# Patient Record
Sex: FEMALE | Race: WHITE | Employment: UNEMPLOYED | URBAN - METROPOLITAN AREA
[De-identification: names, ages, dates, MRNs, and addresses within clinical notes are randomized per-mention and may not be internally consistent; named-entity substitution may affect disease eponyms.]

---

## 2017-02-21 RX ORDER — ALENDRONATE SODIUM 70 MG/1
TABLET ORAL
Qty: 12 TABLET | Refills: 0 | Status: SHIPPED | OUTPATIENT
Start: 2017-02-21 | End: 2017-05-14 | Stop reason: SDUPTHER

## 2017-03-12 RX ORDER — POTASSIUM CHLORIDE 20 MEQ/1
TABLET, EXTENDED RELEASE ORAL
Qty: 90 TABLET | Refills: 2 | Status: SHIPPED | OUTPATIENT
Start: 2017-03-12 | End: 2017-12-07 | Stop reason: SDUPTHER

## 2017-03-12 RX ORDER — LISINOPRIL AND HYDROCHLOROTHIAZIDE 25; 20 MG/1; MG/1
TABLET ORAL
Qty: 90 TABLET | Refills: 2 | Status: SHIPPED | OUTPATIENT
Start: 2017-03-12 | End: 2017-12-05 | Stop reason: SDUPTHER

## 2017-03-13 RX ORDER — PAROXETINE HYDROCHLORIDE 12.5 MG/1
TABLET, FILM COATED, EXTENDED RELEASE ORAL
Qty: 90 TABLET | Refills: 1 | Status: SHIPPED | OUTPATIENT
Start: 2017-03-13 | End: 2017-04-11 | Stop reason: SDUPTHER

## 2017-03-28 DIAGNOSIS — F41.9 ANXIETY: ICD-10-CM

## 2017-03-28 RX ORDER — ALPRAZOLAM 0.25 MG/1
0.25 TABLET ORAL DAILY PRN
Qty: 25 TABLET | Refills: 0 | OUTPATIENT
Start: 2017-03-28

## 2017-04-11 ENCOUNTER — OFFICE VISIT (OUTPATIENT)
Dept: PRIMARY CARE CLINIC | Age: 62
End: 2017-04-11
Payer: COMMERCIAL

## 2017-04-11 VITALS
OXYGEN SATURATION: 97 % | RESPIRATION RATE: 16 BRPM | SYSTOLIC BLOOD PRESSURE: 122 MMHG | HEART RATE: 64 BPM | WEIGHT: 196.2 LBS | BODY MASS INDEX: 36.1 KG/M2 | DIASTOLIC BLOOD PRESSURE: 84 MMHG | HEIGHT: 62 IN

## 2017-04-11 DIAGNOSIS — B07.9 VIRAL WART ON FINGER: ICD-10-CM

## 2017-04-11 DIAGNOSIS — F41.9 ANXIETY: ICD-10-CM

## 2017-04-11 DIAGNOSIS — I10 BENIGN ESSENTIAL HTN: ICD-10-CM

## 2017-04-11 DIAGNOSIS — Z12.39 SCREENING FOR BREAST CANCER: ICD-10-CM

## 2017-04-11 DIAGNOSIS — Z00.00 ROUTINE GENERAL MEDICAL EXAMINATION AT A HEALTH CARE FACILITY: Primary | ICD-10-CM

## 2017-04-11 PROCEDURE — 99396 PREV VISIT EST AGE 40-64: CPT | Performed by: NURSE PRACTITIONER

## 2017-04-11 RX ORDER — ALPRAZOLAM 0.25 MG/1
0.25 TABLET ORAL DAILY PRN
Qty: 25 TABLET | Refills: 0 | Status: SHIPPED | OUTPATIENT
Start: 2017-04-11 | End: 2017-10-04 | Stop reason: SDUPTHER

## 2017-04-11 RX ORDER — PAROXETINE HYDROCHLORIDE 25 MG/1
25 TABLET, FILM COATED, EXTENDED RELEASE ORAL EVERY MORNING
Qty: 90 TABLET | Refills: 3 | Status: SHIPPED | OUTPATIENT
Start: 2017-04-11 | End: 2018-03-19 | Stop reason: SDUPTHER

## 2017-04-11 ASSESSMENT — ENCOUNTER SYMPTOMS
BLOOD IN STOOL: 0
VOMITING: 0
SINUS PRESSURE: 0
TROUBLE SWALLOWING: 0
ABDOMINAL PAIN: 0
DIARRHEA: 0
SORE THROAT: 0
COUGH: 0
NAUSEA: 0
CONSTIPATION: 0
SHORTNESS OF BREATH: 0
WHEEZING: 0

## 2017-04-11 ASSESSMENT — PATIENT HEALTH QUESTIONNAIRE - PHQ9
SUM OF ALL RESPONSES TO PHQ9 QUESTIONS 1 & 2: 1
2. FEELING DOWN, DEPRESSED OR HOPELESS: 1
1. LITTLE INTEREST OR PLEASURE IN DOING THINGS: 0
SUM OF ALL RESPONSES TO PHQ QUESTIONS 1-9: 1

## 2017-05-15 RX ORDER — ALENDRONATE SODIUM 70 MG/1
TABLET ORAL
Qty: 12 TABLET | Refills: 5 | Status: SHIPPED | OUTPATIENT
Start: 2017-05-15 | End: 2018-07-12 | Stop reason: SDUPTHER

## 2017-06-12 ENCOUNTER — OFFICE VISIT (OUTPATIENT)
Dept: NEUROLOGY | Age: 62
End: 2017-06-12
Payer: COMMERCIAL

## 2017-06-12 VITALS
DIASTOLIC BLOOD PRESSURE: 77 MMHG | SYSTOLIC BLOOD PRESSURE: 127 MMHG | HEIGHT: 61 IN | WEIGHT: 198.8 LBS | HEART RATE: 55 BPM | BODY MASS INDEX: 37.53 KG/M2

## 2017-06-12 DIAGNOSIS — G35 MULTIPLE SCLEROSIS (HCC): ICD-10-CM

## 2017-06-12 DIAGNOSIS — G25.0 ESSENTIAL TREMOR: ICD-10-CM

## 2017-06-12 DIAGNOSIS — G35 MULTIPLE SCLEROSIS (HCC): Primary | ICD-10-CM

## 2017-06-12 PROCEDURE — 99214 OFFICE O/P EST MOD 30 MIN: CPT | Performed by: PSYCHIATRY & NEUROLOGY

## 2017-06-12 RX ORDER — DIMETHYL FUMARATE 240 MG/1
CAPSULE ORAL
Qty: 180 CAPSULE | Refills: 3 | Status: SHIPPED | OUTPATIENT
Start: 2017-06-12 | End: 2018-06-11 | Stop reason: SDUPTHER

## 2017-06-12 RX ORDER — PRIMIDONE 50 MG/1
TABLET ORAL
Qty: 360 TABLET | Refills: 3 | Status: SHIPPED | OUTPATIENT
Start: 2017-06-12 | End: 2018-06-20 | Stop reason: SDUPTHER

## 2017-06-21 ENCOUNTER — HOSPITAL ENCOUNTER (OUTPATIENT)
Dept: MRI IMAGING | Facility: CLINIC | Age: 62
Discharge: HOME | End: 2017-06-21
Payer: COMMERCIAL

## 2017-06-21 DIAGNOSIS — G35 MULTIPLE SCLEROSIS (HCC): ICD-10-CM

## 2017-06-21 LAB — POC CREATININE: 0.7 MG/DL (ref 0.6–1.4)

## 2017-06-21 PROCEDURE — A9579 GAD-BASE MR CONTRAST NOS,1ML: HCPCS | Performed by: PSYCHIATRY & NEUROLOGY

## 2017-06-21 PROCEDURE — 72156 MRI NECK SPINE W/O & W/DYE: CPT

## 2017-06-21 PROCEDURE — 82565 ASSAY OF CREATININE: CPT

## 2017-06-21 PROCEDURE — 6360000004 HC RX CONTRAST MEDICATION: Performed by: PSYCHIATRY & NEUROLOGY

## 2017-06-21 PROCEDURE — 70553 MRI BRAIN STEM W/O & W/DYE: CPT

## 2017-06-21 RX ADMIN — GADOPENTETATE DIMEGLUMINE 18 ML: 469.01 INJECTION INTRAVENOUS at 10:39

## 2017-07-10 RX ORDER — AMLODIPINE BESYLATE 5 MG/1
TABLET ORAL
Qty: 90 TABLET | Refills: 1 | Status: SHIPPED | OUTPATIENT
Start: 2017-07-10 | End: 2017-12-14 | Stop reason: SDUPTHER

## 2017-10-04 ENCOUNTER — HOSPITAL ENCOUNTER (OUTPATIENT)
Age: 62
Discharge: HOME | End: 2017-10-04
Payer: COMMERCIAL

## 2017-10-04 ENCOUNTER — OFFICE VISIT (OUTPATIENT)
Dept: PRIMARY CARE CLINIC | Age: 62
End: 2017-10-04
Payer: COMMERCIAL

## 2017-10-04 VITALS
SYSTOLIC BLOOD PRESSURE: 108 MMHG | BODY MASS INDEX: 37.19 KG/M2 | WEIGHT: 197 LBS | DIASTOLIC BLOOD PRESSURE: 66 MMHG | HEIGHT: 61 IN | RESPIRATION RATE: 14 BRPM | HEART RATE: 68 BPM

## 2017-10-04 DIAGNOSIS — Z01.818 ENCOUNTER FOR PRE-OPERATIVE EXAMINATION: Primary | ICD-10-CM

## 2017-10-04 DIAGNOSIS — F41.9 ANXIETY: ICD-10-CM

## 2017-10-04 LAB
EKG ATRIAL RATE: 63 BPM
EKG P AXIS: 25 DEGREES
EKG P-R INTERVAL: 140 MS
EKG Q-T INTERVAL: 442 MS
EKG QRS DURATION: 90 MS
EKG QTC CALCULATION (BAZETT): 452 MS
EKG R AXIS: 13 DEGREES
EKG T AXIS: 24 DEGREES
EKG VENTRICULAR RATE: 63 BPM

## 2017-10-04 PROCEDURE — 99214 OFFICE O/P EST MOD 30 MIN: CPT | Performed by: NURSE PRACTITIONER

## 2017-10-04 PROCEDURE — 93005 ELECTROCARDIOGRAM TRACING: CPT

## 2017-10-04 PROCEDURE — 93000 ELECTROCARDIOGRAM COMPLETE: CPT | Performed by: NURSE PRACTITIONER

## 2017-10-04 RX ORDER — ALPRAZOLAM 0.25 MG/1
0.25 TABLET ORAL DAILY PRN
Qty: 25 TABLET | Refills: 0 | Status: SHIPPED | OUTPATIENT
Start: 2017-10-04 | End: 2018-08-17 | Stop reason: SDUPTHER

## 2017-10-04 ASSESSMENT — ENCOUNTER SYMPTOMS
ABDOMINAL PAIN: 0
BACK PAIN: 0
SHORTNESS OF BREATH: 0
COUGH: 0

## 2017-10-04 NOTE — PROGRESS NOTES
Visit Information    Have you changed or started any medications since your last visit including any over-the-counter medicines, vitamins, or herbal medicines? no   Are you having any side effects from any of your medications? -  no  Have you stopped taking any of your medications? Is so, why? -  no    Have you seen any other physician or provider since your last visit? No  Have you had any other diagnostic tests since your last visit? No  Have you been seen in the emergency room and/or had an admission to a hospital since we last saw you? No  Have you had your routine dental cleaning in the past 6 months? yes     Have you activated your [employer] account? If not, what are your barriers?  No: pending     Patient Care Team:  Danis Baird DO as PCP - General (Internal Medicine)  Waldemar Sneed MD as Consulting Physician (Neurology)  Twila Colón MD as Consulting Physician (Gastroenterology)    Medical History Review  Past Medical, Family, and Social History reviewed and does contribute to the patient presenting condition    Health Maintenance   Topic Date Due    DTaP/Tdap/Td vaccine (1 - Tdap) 12/26/1976    Cervical cancer screen  01/01/2003    Colon cancer screen colonoscopy  11/20/2016    Flu vaccine (1) 09/01/2017    Hepatitis C screen  04/11/2016 (Originally 1955)    HIV screen  04/11/2018 (Originally 12/26/1972)    Breast cancer screen  06/25/2018    Lipid screen  03/26/2021
TAKE 1 TABLET DAILY 90 tablet 2    lisinopril-hydrochlorothiazide (PRINZIDE;ZESTORETIC) 20-25 MG per tablet TAKE 1 TABLET DAILY 90 tablet 2    LIALDA 1.2 G EC tablet       aspirin 81 MG EC tablet Take 81 mg by mouth daily. No current facility-administered medications for this visit. No Known Allergies    Health Maintenance   Topic Date Due    DTaP/Tdap/Td vaccine (1 - Tdap) 12/26/1976    Cervical cancer screen  01/01/2003    Colon cancer screen colonoscopy  11/20/2016    Flu vaccine (1) 09/01/2017    Hepatitis C screen  04/11/2018 (Originally 1955)    HIV screen  04/11/2018 (Originally 12/26/1972)    Breast cancer screen  06/25/2018    Lipid screen  03/26/2021       Subjective:      Review of Systems   Constitutional: Negative for chills, fatigue and fever. HENT: Negative for congestion. Respiratory: Negative for cough and shortness of breath. Cardiovascular: Negative for chest pain and palpitations. Gastrointestinal: Negative for abdominal pain. Genitourinary: Negative for dysuria. Musculoskeletal: Negative for back pain. Left foot bunion   Neurological: Negative for dizziness and numbness. Psychiatric/Behavioral: Negative for sleep disturbance. The patient is not nervous/anxious. Objective:     Physical Exam   Constitutional: She is oriented to person, place, and time. She appears well-developed and well-nourished. HENT:   Head: Normocephalic and atraumatic. Eyes: Pupils are equal, round, and reactive to light. Neck: Normal range of motion. Neck supple. Cardiovascular: Normal rate, regular rhythm and normal heart sounds. Pulmonary/Chest: Effort normal and breath sounds normal.   Abdominal: Soft. Bowel sounds are normal. There is no tenderness. Musculoskeletal: Normal range of motion. Neurological: She is alert and oriented to person, place, and time. Skin: Skin is warm and dry. Psychiatric: She has a normal mood and affect.  Her behavior

## 2017-10-10 ENCOUNTER — TELEPHONE (OUTPATIENT)
Dept: PRIMARY CARE CLINIC | Age: 62
End: 2017-10-10

## 2017-12-05 NOTE — TELEPHONE ENCOUNTER
Health Maintenance   Topic Date Due    DTaP/Tdap/Td vaccine (1 - Tdap) 12/26/1976    Cervical cancer screen  01/01/2003    Colon cancer screen colonoscopy  11/20/2016    Flu vaccine (1) 09/01/2017    Hepatitis C screen  04/11/2016 (Originally 1955)    HIV screen  04/11/2018 (Originally 12/26/1972)    Breast cancer screen  06/25/2018    Lipid screen  03/26/2021             (applicable per patient's age: Cancer Screenings, Depression Screening, Fall Risk Screening, Immunizations)    LDL Cholesterol (mg/dL)   Date Value   03/18/2015 129     LDL Calculated (mg/dL)   Date Value   03/26/2016 128     AST (IU/L)   Date Value   03/26/2016 15     ALT (IU/L)   Date Value   03/26/2016 10     BUN (mg/dl)   Date Value   03/26/2016 17      (goal A1C is < 7)   (goal LDL is <100) need 30-50% reduction from baseline     BP Readings from Last 3 Encounters:   10/04/17 108/66   06/12/17 127/77   04/11/17 122/84    (goal /80)      All Future Testing planned in [facility]:  Lab Frequency Next Occurrence   CBC Once 05/11/2017   Lipid Panel Once 05/11/2017   TSH with Reflex Once 05/11/2017   Comprehensive Metabolic Panel Once 01/17/5627   DANIA Digital Screen Bilateral Once 05/11/2017   ALT Once 07/27/2017   Vitamin B12 Once 07/27/2017   AST Once 07/27/2017   Folate Once 07/27/2017   CBC Once 06/12/2017   Primidone level Once 07/27/2017       Next Visit Date:  Future Appointments  Date Time Provider Nabila Moeller   6/20/2018 1:20 PM Debbie Toure MD Neuro Spec [email or web address]            Patient Active Problem List:     Carpal tunnel syndrome     Multiple sclerosis ([address]     Hepatitis     Osteopenia     Dyslipidemia     Anxiety     Crohn disease ([address]     Benign essential HTN

## 2017-12-06 RX ORDER — LISINOPRIL AND HYDROCHLOROTHIAZIDE 25; 20 MG/1; MG/1
TABLET ORAL
Qty: 90 TABLET | Refills: 2 | Status: SHIPPED | OUTPATIENT
Start: 2017-12-06 | End: 2018-09-04 | Stop reason: SDUPTHER

## 2017-12-07 RX ORDER — POTASSIUM CHLORIDE 20 MEQ/1
TABLET, EXTENDED RELEASE ORAL
Qty: 90 TABLET | Refills: 2 | Status: SHIPPED | OUTPATIENT
Start: 2017-12-07 | End: 2018-09-04 | Stop reason: SDUPTHER

## 2017-12-14 RX ORDER — AMLODIPINE BESYLATE 5 MG/1
TABLET ORAL
Qty: 90 TABLET | Refills: 1 | Status: SHIPPED | OUTPATIENT
Start: 2017-12-14 | End: 2018-06-12 | Stop reason: SDUPTHER

## 2018-03-08 DIAGNOSIS — G25.0 ESSENTIAL TREMOR: ICD-10-CM

## 2018-03-08 DIAGNOSIS — G35 MULTIPLE SCLEROSIS (HCC): ICD-10-CM

## 2018-06-11 DIAGNOSIS — G35 MULTIPLE SCLEROSIS (HCC): ICD-10-CM

## 2018-06-11 RX ORDER — DIMETHYL FUMARATE 240 MG/1
CAPSULE ORAL
Qty: 180 CAPSULE | Refills: 0 | Status: SHIPPED | OUTPATIENT
Start: 2018-06-11 | End: 2018-09-02 | Stop reason: SDUPTHER

## 2018-06-12 RX ORDER — AMLODIPINE BESYLATE 5 MG/1
TABLET ORAL
Qty: 90 TABLET | Refills: 1 | Status: SHIPPED | OUTPATIENT
Start: 2018-06-12 | End: 2018-12-09 | Stop reason: SDUPTHER

## 2018-06-20 ENCOUNTER — OFFICE VISIT (OUTPATIENT)
Dept: NEUROLOGY | Age: 63
End: 2018-06-20
Payer: COMMERCIAL

## 2018-06-20 VITALS
HEART RATE: 65 BPM | SYSTOLIC BLOOD PRESSURE: 128 MMHG | BODY MASS INDEX: 36.25 KG/M2 | WEIGHT: 197 LBS | DIASTOLIC BLOOD PRESSURE: 65 MMHG | HEIGHT: 62 IN

## 2018-06-20 DIAGNOSIS — G35 MULTIPLE SCLEROSIS (HCC): Primary | ICD-10-CM

## 2018-06-20 DIAGNOSIS — G25.0 ESSENTIAL TREMOR: ICD-10-CM

## 2018-06-20 PROCEDURE — 99214 OFFICE O/P EST MOD 30 MIN: CPT | Performed by: PSYCHIATRY & NEUROLOGY

## 2018-06-20 RX ORDER — PRIMIDONE 50 MG/1
TABLET ORAL
Qty: 360 TABLET | Refills: 3 | Status: SHIPPED | OUTPATIENT
Start: 2018-06-20 | End: 2019-07-10 | Stop reason: SDUPTHER

## 2018-06-20 RX ORDER — DIMETHYL FUMARATE 240 MG/1
CAPSULE ORAL
Qty: 180 CAPSULE | Refills: 3 | Status: SHIPPED | OUTPATIENT
Start: 2018-06-20 | End: 2019-09-27 | Stop reason: SDUPTHER

## 2018-06-20 ASSESSMENT — ENCOUNTER SYMPTOMS
EYES NEGATIVE: 1
GASTROINTESTINAL NEGATIVE: 1
RESPIRATORY NEGATIVE: 1

## 2018-06-22 LAB
ABSOLUTE BASO #: 0.1 K/UL (ref 0–0.1)
ABSOLUTE EOS #: 0.2 K/UL (ref 0.1–0.4)
ABSOLUTE LYMPH #: 1.6 K/UL (ref 0.8–5.2)
ABSOLUTE MONO #: 0.8 K/UL (ref 0.1–0.9)
ABSOLUTE NEUT #: 4.4 K/UL (ref 1.3–9.1)
ALT SERPL-CCNC: 17 U/L (ref 5–40)
AST SERPL-CCNC: 18 U/L (ref 9–40)
BASOPHILS RELATIVE PERCENT: 0.7 %
EOSINOPHILS RELATIVE PERCENT: 3.1 %
HCT VFR BLD CALC: 41 % (ref 36–48)
HEMOGLOBIN: 13.8 G/DL (ref 12–16)
LYMPHOCYTE %: 21.9 %
MCH RBC QN AUTO: 29.8 PG (ref 27–34)
MCHC RBC AUTO-ENTMCNC: 33.7 G/DL (ref 31–36)
MCV RBC AUTO: 88.6 FL (ref 80–100)
MONOCYTES # BLD: 11 %
NEUTROPHILS RELATIVE PERCENT: 62.3 %
PDW BLD-RTO: 13 % (ref 10.8–14.8)
PLATELETS: 306 K/UL (ref 150–450)
RBC: 4.63 M/UL (ref 4–5.5)
WBC: 7.1 K/UL (ref 3.7–10.8)

## 2018-07-12 RX ORDER — ALENDRONATE SODIUM 70 MG/1
TABLET ORAL
Qty: 12 TABLET | Refills: 5 | Status: SHIPPED | OUTPATIENT
Start: 2018-07-12 | End: 2019-09-05 | Stop reason: SDUPTHER

## 2018-07-12 NOTE — TELEPHONE ENCOUNTER
Last OV 10/04/17      Health Maintenance   Topic Date Due    Hepatitis C screen  1955    HIV screen  12/26/1972    DTaP/Tdap/Td vaccine (1 - Tdap) 12/26/1976    Cervical cancer screen  01/01/2003    Shingles Vaccine (1 of 2 - 2 Dose Series) 12/26/2007    Colon cancer screen colonoscopy  11/20/2016    Potassium monitoring  03/26/2017    Creatinine monitoring  03/26/2017    Breast cancer screen  06/25/2018    Flu vaccine (1) 09/01/2018    Lipid screen  03/26/2021             (applicable per patient's age: Cancer Screenings, Depression Screening, Fall Risk Screening, Immunizations)    LDL Cholesterol (mg/dL)   Date Value   03/18/2015 129     LDL Calculated (mg/dL)   Date Value   03/26/2016 128     AST (U/L)   Date Value   06/22/2018 18     ALT (U/L)   Date Value   06/22/2018 17     BUN (mg/dl)   Date Value   03/26/2016 17      (goal A1C is < 7)   (goal LDL is <100) need 30-50% reduction from baseline     BP Readings from Last 3 Encounters:   06/20/18 128/65   10/04/17 108/66   06/12/17 127/77    (goal /80)      All Future Testing planned in [facility]:  Lab Frequency Next Occurrence   ALT Once 08/04/2018   AST Once 08/04/2018   CBC With Auto Differential Once 08/04/2018   WI CANE ADJUST/FIXED QUAD/3 PRO Once 08/04/2018       Next Visit Date:  Future Appointments  Date Time Provider Nabila Edwardsi   8/22/2018 1:00 PM Judie Longoria MD Neuro Spec [email or web address]            Patient Active Problem List:     Carpal tunnel syndrome     Multiple sclerosis ([address]     Hepatitis     Osteopenia     Dyslipidemia     Anxiety     Crohn disease ([facility] [address]     Benign essential HTN

## 2018-08-17 DIAGNOSIS — F41.9 ANXIETY: ICD-10-CM

## 2018-08-17 RX ORDER — ALPRAZOLAM 0.25 MG/1
TABLET ORAL
Qty: 25 TABLET | Refills: 0 | Status: SHIPPED | OUTPATIENT
Start: 2018-08-17 | End: 2019-03-25 | Stop reason: SDUPTHER

## 2018-08-17 NOTE — TELEPHONE ENCOUNTER
Last Ov 10/4/17      Health Maintenance   Topic Date Due    Hepatitis C screen  1955    HIV screen  12/26/1972    DTaP/Tdap/Td vaccine (1 - Tdap) 12/26/1976    Cervical cancer screen  01/01/2003    Shingles Vaccine (1 of 2 - 2 Dose Series) 12/26/2007    Colon cancer screen colonoscopy  11/20/2016    Potassium monitoring  03/26/2017    Creatinine monitoring  03/26/2017    Breast cancer screen  06/25/2018    Flu vaccine (1) 09/01/2018    Lipid screen  03/26/2021             (applicable per patient's age: Cancer Screenings, Depression Screening, Fall Risk Screening, Immunizations)    LDL Cholesterol (mg/dL)   Date Value   03/18/2015 129     LDL Calculated (mg/dL)   Date Value   03/26/2016 128     AST (U/L)   Date Value   06/22/2018 18     ALT (U/L)   Date Value   06/22/2018 17     BUN (mg/dl)   Date Value   03/26/2016 17      (goal A1C is < 7)   (goal LDL is <100) need 30-50% reduction from baseline     BP Readings from Last 3 Encounters:   06/20/18 128/65   10/04/17 108/66   06/12/17 127/77    (goal /80)      All Future Testing planned in [facility]:  Lab Frequency Next Occurrence   ALT Once 08/04/2018   AST Once 08/04/2018   CBC With Auto Differential Once 08/04/2018   VT CANE ADJUST/FIXED QUAD/3 PRO Once 08/04/2018       Next Visit Date:  Future Appointments  Date Time Provider Nabila Edwardsi   8/22/2018 10:40 AM Shelbi Ruggiero MD Neuro Spec [email or web address]            Patient Active Problem List:     Carpal tunnel syndrome     Multiple sclerosis ([facility] [address]     Hepatitis     Osteopenia     Dyslipidemia     Anxiety     Crohn disease ([facility] [address]     Benign essential HTN

## 2018-08-22 ENCOUNTER — OFFICE VISIT (OUTPATIENT)
Dept: NEUROLOGY | Age: 63
End: 2018-08-22
Payer: COMMERCIAL

## 2018-08-22 VITALS
SYSTOLIC BLOOD PRESSURE: 116 MMHG | HEIGHT: 62 IN | DIASTOLIC BLOOD PRESSURE: 75 MMHG | BODY MASS INDEX: 38.72 KG/M2 | WEIGHT: 210.4 LBS | HEART RATE: 74 BPM

## 2018-08-22 DIAGNOSIS — G35 MULTIPLE SCLEROSIS (HCC): Primary | ICD-10-CM

## 2018-08-22 PROCEDURE — 99214 OFFICE O/P EST MOD 30 MIN: CPT | Performed by: PSYCHIATRY & NEUROLOGY

## 2018-08-22 RX ORDER — ACETAMINOPHEN 160 MG
TABLET,DISINTEGRATING ORAL
COMMUNITY
End: 2021-03-24

## 2018-08-22 RX ORDER — CHOLECALCIFEROL (VITAMIN D3) 50 MCG
2000 TABLET ORAL DAILY
Qty: 90 TABLET | Refills: 3 | Status: SHIPPED | OUTPATIENT
Start: 2018-08-22 | End: 2020-06-04 | Stop reason: SDUPTHER

## 2018-08-22 RX ORDER — PERPHENAZINE 16 MG
TABLET ORAL
COMMUNITY

## 2018-08-22 RX ORDER — LANOLIN ALCOHOL/MO/W.PET/CERES
1000 CREAM (GRAM) TOPICAL DAILY
COMMUNITY
End: 2019-09-27 | Stop reason: ALTCHOICE

## 2018-08-22 ASSESSMENT — ENCOUNTER SYMPTOMS
GASTROINTESTINAL NEGATIVE: 1
EYES NEGATIVE: 1
RESPIRATORY NEGATIVE: 1

## 2018-08-22 NOTE — COMMUNICATION BODY
Subjective:      Patient ID: Berenice Gamez is a 63 y.o. female. HPI    Active Problem remitting relapsing multiple sclerosis with greater gait imbalance and ataxia on tecfidera referred to [facility] to see if candidate for ocrevus . There is elevated anticardiolipin Ab on aspirin. There is tremor on mysoline . The condition is she was seen by Dr Ronal Coon at [facility] who felt that patient should stay on tecfidera with no active enhancing lesions on imaging  . She is walking with her father's  walker feeling more steadier with no falls with walker use . There will be left leg giveway after being on her feet over 20 minutes . There is upper extremity and head tremor with only mild attenuation on mysoline by her report . There is no visual or bulbar complaints . There is some occasional urinary urgency with no incontinence . She remains on tecfidera tolerating this well. [facility] suggestions were also for vitamin B12 and vitamin D supplementation . Significant medications tecfidera 240 mg po bid ,  aspirin 81 mg po qd, mysoline 50 mg po q AM , 150 mg pos qhs . Previously on avonex. Testing MRI of Head with few foci periventricular white matter supratentorially. The largest is 1.6 cm parietal periventricular area. There is no enhancement . There is also 1 cm right parietal extra axial meningioma, February 2011. MRI of cervical spine with small patchy areas within the cord from C3-4 through C4-5 with no enhancement. There is bulging at C4-5 along with C5-6. CSF analysis with elevated IgG with oligoclonal bands. MORIAH 1:140, anticardiolipin IgM 66 . Primidone level 7.5 , January 2016 . Folate 10 , B12 263, primidone 6.0 (5-12) . MRI of Head with no enhancement . There are disc protrusions and spondylosis with mild spinal stenosis ,June 2017 . MRI of cervical spine with multifocal cord signal abnormalities with no enhancement .  Bilateral periventricular and deep white matter T2 hyperintensities , 2017      Past Medical History:   Diagnosis Date    Cro[facility]'s [facility] ([facility] [address]     Diverticulosis     Hepatitis     Hypertension     Multiple sclerosis ([facility] [address] [facility]     Osteopenia     Psoriasis        Past Surgical History:   Procedure Laterality Date    APPENDECTOMY       SECTION      CHOLECYSTECTOMY      COLONOSCOPY  2013, 2015    CYST REMOVAL      FOOT SURGERY  2012    right foot bunionectomy    TONSILLECTOMY AND ADENOIDECTOMY      WRIST FRACTURE SURGERY         Family History   Problem Relation Age of Onset    Cirrhosis Mother     Heart Disease Father        Social History     Social History    Marital status:      Spouse name: N/A    Number of children: N/A    Years of education: N/A     Social History Main Topics    Smoking status: Never Smoker    Smokeless tobacco: Never Used    Alcohol use No    Drug use: No    Sexual activity: Not Asked     Other Topics Concern    None     Social History Narrative    None       Current Outpatient Prescriptions   Medication Sig Dispense Refill    Cholecalciferol (VITAMIN D3) 2000 units CAPS Take by mouth      vitamin B-12 (CYANOCOBALAMIN) 1000 MCG tablet Take 1,000 mcg by mouth daily      Alpha-Lipoic Acid 600 MG CAPS Take by mouth      cyanocobalamin ([employer] VITAMIN B12) 1000 MCG tablet Take 1 tablet by mouth daily 90 tablet 3    Cholecalciferol (VITAMIN D) 2000 units TABS tablet Take 1 tablet by mouth daily 90 tablet 3    ALPRAZolam (XANAX) 0.25 MG tablet TAKE ONE TABLET BY MOUTH DAILY AS NEEDED FOR ANXIETY 25 tablet 0    alendronate (FOSAMAX) 70 MG tablet TAKE 1 TABLET EVERY 7 DAYS 12 tablet 5    primidone (MYSOLINE) 50 MG tablet Take 1po qAM , 3 po qhs 360 tablet 3    dimethyl fumarate (TECFIDERA) 240 MG delayed release capsule Take 1 capsule by mouth 2 times daily.  180 capsule 3    amLODIPine (NORVASC) 5 MG tablet TAKE 1 TABLET DAILY 90 tablet 1    TECFIDERA 240 MG delayed release capsule TAKE masseter and temporalis . Intact corneal reflex. Normal facial sensation  Cranial nerve 7 normal exam   Cranial nerve 8. Grossly intact hearing   Cranial nerve 9 and 10. Symmetric palate elevation   Cranial nerve 11 , 5 out of 5 strength   Cranial Nerve 12 midline tongue . No atrophy  Sensation . Normal proprioception . Intact Vibration . Normal pinprick and light touch   Deep Tendon Reflexes normal  Plantar response equivocal bilaterally      Assessment:       1. Multiple sclerosis    She is to remain on tecfidera undergoing PT balance therapy  . Will get for her walker with seat readjusting mysoline to 100 mg po bid placing her on B12 and Vitamin D tablets     Orders Placed This Encounter   Procedures    PT eval and treat     Standing Status:   Future     Standing Expiration Date:   8/22/2019     Scheduling Instructions:      Assess and treat .  Balance therapy, gait retraining 3 weeks 3 x per week    Walker rolling     With seat

## 2018-08-22 NOTE — LETTER
through C4-5 with no enhancement. There is bulging at C4-5 along with C5-6. CSF analysis with elevated IgG with oligoclonal bands. MORIAH 1:140, anticardiolipin IgM 66 . Primidone level 7.5 , 2016 . Folate 10 , B12 263, primidone 6.0 (5-12) . MRI of Head with no enhancement . There are disc protrusions and spondylosis with mild spinal stenosis ,2017 . MRI of cervical spine with multifocal cord signal abnormalities with no enhancement .  Bilateral periventricular and deep white matter T2  hyperintensities , 2017      Past Medical History:   Diagnosis Date    [address]'s [facility] ([facility] [address]     Diverticulosis     Hepatitis     Hypertension     Multiple sclerosis ([facility]     Osteopenia     Psoriasis        Past Surgical History:   Procedure Laterality Date    APPENDECTOMY       SECTION      CHOLECYSTECTOMY      COLONOSCOPY  2013, 2015    CYST REMOVAL      FOOT SURGERY  2012    right foot bunionectomy    TONSILLECTOMY AND ADENOIDECTOMY      WRIST FRACTURE SURGERY         Family History   Problem Relation Age of Onset    Cirrhosis Mother     Heart Disease Father        Social History     Social History    Marital status:      Spouse name: N/A    Number of children: N/A    Years of education: N/A     Social History Main Topics    Smoking status: Never Smoker    Smokeless tobacco: Never Used    Alcohol use No    Drug use: No    Sexual activity: Not Asked     Other Topics Concern    None     Social History Narrative    None       Current Outpatient Prescriptions   Medication Sig Dispense Refill    Cholecalciferol (VITAMIN D3) 2000 units CAPS Take by mouth      vitamin B-12 (CYANOCOBALAMIN) 1000 MCG tablet Take 1,000 mcg by mouth daily      Alpha-Lipoic Acid 600 MG CAPS Take by mouth      cyanocobalamin ([employer]S VITAMIN B12) 1000 MCG tablet Take 1 tablet by mouth daily 90 tablet 3    Cholecalciferol (VITAMIN D) 2000 units TABS tablet Take 1 tablet by Musculoskeletal. Muscle bulk and tone normal                                                               Muscle strength 5/5 throughout                                                                              No dysmetria or dysdiadokinesis  Mild postural head and arm tremor   Normal fine motor  Gait ataxia  Orientation Alert and oriented x 3   Language process and speech normal . No aphasia   Cranial nerve 2 normal acuety and visual fields  Cranial nerve 3, 4 and 6 . Extraocular muscles are intact . Pupils are equal and reactive   Cranial nerve 5 . Normal strength of masseter and temporalis . Intact corneal reflex. Normal facial sensation  Cranial nerve 7 normal exam   Cranial nerve 8. Grossly intact hearing   Cranial nerve 9 and 10. Symmetric palate elevation   Cranial nerve 11 , 5 out of 5 strength   Cranial Nerve 12 midline tongue . No atrophy  Sensation . Normal proprioception . Intact Vibration . Normal pinprick and light touch   Deep Tendon Reflexes normal  Plantar response equivocal bilaterally      Assessment:       1. Multiple sclerosis    She is to remain on tecfidera undergoing PT balance therapy  . Will get for her walker with seat readjusting mysoline to 100 mg po bid placing her on B12 and Vitamin D tablets     Orders Placed This Encounter   Procedures    PT eval and treat     Standing Status:   Future     Standing Expiration Date:   8/22/2019     Scheduling Instructions:      Assess and treat . Balance therapy, gait retraining 3 weeks 3 x per week    Walker rolling     With seat               If you have questions, please do not hesitate to call me. I look forward to following Lauren Mandujano along with you.     Sincerely,        Patrice Hunt MD    CC providers:  [facility] - [facility]  [address] [facility] [address] [facility]  [address]  [facility] [id number]  [facility] [address]

## 2018-08-22 NOTE — PROGRESS NOTES
Subjective:      Patient ID: Holli Olvera is a 63 y.o. female. HPI    Active Problem remitting relapsing multiple sclerosis with greater gait imbalance and ataxia on tecfidera referred to [facility] to see if candidate for ocrevus . There is elevated anticardiolipin Ab on aspirin. There is tremor on mysoline . The condition is she was seen by Dr Douglas Thomas at [facility] who felt that patient should stay on tecfidera with no active enhancing lesions on imaging  . She is walking with her father's  walker feeling more steadier with no falls with walker use . There will be left leg giveway after being on her feet over 20 minutes . There is upper extremity and head tremor with only mild attenuation on mysoline by her report . There is no visual or bulbar complaints . There is some occasional urinary urgency with no incontinence . She remains on tecfidera tolerating this well. [facility] suggestions were also for vitamin B12 and vitamin D supplementation . Significant medications tecfidera 240 mg po bid ,  aspirin 81 mg po qd, mysoline 50 mg po q AM , 150 mg pos qhs . Previously on avonex. Testing MRI of Head with few foci periventricular white matter supratentorially. The largest is 1.6 cm parietal periventricular area. There is no enhancement . There is also 1 cm right parietal extra axial meningioma, February 2011. MRI of cervical spine with small patchy areas within the cord from C3-4 through C4-5 with no enhancement. There is bulging at C4-5 along with C5-6. CSF analysis with elevated IgG with oligoclonal bands. MORIAH 1:140, anticardiolipin IgM 66 . Primidone level 7.5 , January 2016 . Folate 10 , B12 263, primidone 6.0 (5-12) . MRI of Head with no enhancement . There are disc protrusions and spondylosis with mild spinal stenosis ,June 2017 .  MRI of cervical spine with multifocal cord signal abnormalities with no enhancement ilateral periventricular and deep white matter T2 hyperintensities , 2017      Past Medical History:   Diagnosis Date    Cro[facility] disease ([facility] [address]     Diverticulosis     Hepatitis     Hypertension     Multiple sclerosis ([facility] [address]     Osteopenia     Psoriasis        Past Surgical History:   Procedure Laterality Date    APPENDECTOMY       SECTION      CHOLECYSTECTOMY      COLONOSCOPY  2013, 2015    CYST REMOVAL      FOOT SURGERY  2012    right foot bunionectomy    TONSILLECTOMY AND ADENOIDECTOMY      WRIST FRACTURE SURGERY         Family History   Problem Relation Age of Onset    Cirrhosis Mother     Heart Disease Father        Social History     Social History    Marital status:      Spouse name: N/A    Number of children: N/A    Years of education: N/A     Social History Main Topics    Smoking status: Never Smoker    Smokeless tobacco: Never Used    Alcohol use No    Drug use: No    Sexual activity: Not Asked     Other Topics Concern    None     Social History Narrative    None       Current Outpatient Prescriptions   Medication Sig Dispense Refill    Cholecalciferol (VITAMIN D3) 2000 units CAPS Take by mouth      vitamin B-12 (CYANOCOBALAMIN) 1000 MCG tablet Take 1,000 mcg by mouth daily      Alpha-Lipoic Acid 600 MG CAPS Take by mouth      cyanocobalamin (CVS VITAMIN B12) 1000 MCG tablet Take 1 tablet by mouth daily 90 tablet 3    Cholecalciferol (VITAMIN D) 2000 units TABS tablet Take 1 tablet by mouth daily 90 tablet 3    ALPRAZolam (XANAX) 0.25 MG tablet TAKE ONE TABLET BY MOUTH DAILY AS NEEDED FOR ANXIETY 25 tablet 0    alendronate (FOSAMAX) 70 MG tablet TAKE 1 TABLET EVERY 7 DAYS 12 tablet 5    primidone (MYSOLINE) 50 MG tablet Take 1po qAM , 3 po qhs 360 tablet 3    dimethyl fumarate (TECFIDERA) 240 MG delayed release capsule Take 1 capsule by mouth 2 times daily.  180 capsule 3    amLODIPine (NORVASC) 5 MG tablet TAKE 1 TABLET DAILY 90 tablet 1    TECFIDERA 240 MG delayed release capsule TAKE 1 CAPSULE BY MOUTH 2 TIMES DAILY 180 capsule 0    PARoxetine (PAXIL CR) 25 MG extended release tablet Take 1 tablet by mouth every morning 90 tablet 3    potassium chloride (KLOR-CON M) 20 MEQ extended release tablet TAKE 1 TABLET DAILY 90 tablet 2    lisinopril-hydrochlorothiazide (PRINZIDE;ZESTORETIC) 20-25 MG per tablet TAKE 1 TABLET DAILY 90 tablet 2    LIALDA 1.2 G EC tablet       aspirin 81 MG EC tablet Take 81 mg by mouth daily. No current facility-administered medications for this visit. No Known Allergies        Review of Systems   Constitutional: Positive for unexpected weight change. HENT: Negative. Eyes: Negative. Respiratory: Negative. Cardiovascular: Negative. Gastrointestinal: Negative. Endocrine: Negative. Genitourinary: Positive for urgency. Musculoskeletal: Positive for gait problem. Skin: Negative. Neurological: Positive for tremors and weakness. Hematological: Negative. Psychiatric/Behavioral: Negative. Objective:   Physical Exam    Vitals:    08/22/18 1044   BP: 116/75   Pulse: 74       Neurological Examination  Constitutional .General exam well groomed   Ears /Nose/Throat: external ear . Normal exam  Neck and thyroid . Normal size  Respiratory . Breathsounds clear bilaterally  Cardiovascular: Auscultation of heart with regular rate and rhythm and normal heart sounds  Musculoskeletal. Muscle bulk and tone normal                                                               Muscle strength 5/5 throughout                                                                              No dysmetria or dysdiadokinesis  Mild postural head and arm tremor   Normal fine motor  Gait ataxia  Orientation Alert and oriented x 3   Language process and speech normal . No aphasia   Cranial nerve 2 normal acuety and visual fields  Cranial nerve 3, 4 and 6 . Extraocular muscles are intact . Pupils are equal and reactive   Cranial nerve 5 . Normal strength of masseter and temporalis . Intact corneal reflex. Normal facial sensation  Cranial nerve 7 normal exam   Cranial nerve 8. Grossly intact hearing   Cranial nerve 9 and 10. Symmetric palate elevation   Cranial nerve 11 , 5 out of 5 strength   Cranial Nerve 12 midline tongue . No atrophy  Sensation . Normal proprioception . Intact Vibration . Normal pinprick and light touch   Deep Tendon Reflexes normal  Plantar response equivocal bilaterally      Assessment:       1. Multiple sclerosis    She is to remain on tecfidera undergoing PT balance therapy  . Will get for her walker with seat readjusting mysoline to 100 mg po bid placing her on B12 and Vitamin D tablets     Orders Placed This Encounter   Procedures    PT eval and treat     Standing Status:   Future     Standing Expiration Date:   8/22/2019     Scheduling Instructions:      Assess and treat .  Balance therapy, gait retraining 3 weeks 3 x per week    Walker rolling     With seat

## 2018-09-02 DIAGNOSIS — G35 MULTIPLE SCLEROSIS (HCC): ICD-10-CM

## 2018-09-04 RX ORDER — LISINOPRIL AND HYDROCHLOROTHIAZIDE 25; 20 MG/1; MG/1
TABLET ORAL
Qty: 90 TABLET | Refills: 2 | Status: SHIPPED | OUTPATIENT
Start: 2018-09-04 | End: 2019-06-03 | Stop reason: SDUPTHER

## 2018-09-04 RX ORDER — POTASSIUM CHLORIDE 20 MEQ/1
TABLET, EXTENDED RELEASE ORAL
Qty: 90 TABLET | Refills: 2 | Status: SHIPPED | OUTPATIENT
Start: 2018-09-04 | End: 2019-11-17 | Stop reason: SDUPTHER

## 2018-09-05 RX ORDER — DIMETHYL FUMARATE 240 MG/1
CAPSULE ORAL
Qty: 180 CAPSULE | Refills: 2 | Status: SHIPPED | OUTPATIENT
Start: 2018-09-05 | End: 2019-08-14 | Stop reason: SDUPTHER

## 2018-09-06 ENCOUNTER — TELEPHONE (OUTPATIENT)
Dept: NEUROLOGY | Age: 63
End: 2018-09-06

## 2018-09-06 NOTE — TELEPHONE ENCOUNTER
Received a call from Farooq Barnes that she had received a jury duty notice. Discussed with  and we can excuse her. Mailed her the note.

## 2018-10-12 DIAGNOSIS — G35 MULTIPLE SCLEROSIS (HCC): ICD-10-CM

## 2018-11-12 ENCOUNTER — OFFICE VISIT (OUTPATIENT)
Dept: NEUROLOGY | Age: 63
End: 2018-11-12
Payer: COMMERCIAL

## 2018-11-12 VITALS
DIASTOLIC BLOOD PRESSURE: 73 MMHG | SYSTOLIC BLOOD PRESSURE: 123 MMHG | BODY MASS INDEX: 40.23 KG/M2 | WEIGHT: 218.6 LBS | HEART RATE: 63 BPM | HEIGHT: 62 IN

## 2018-11-12 DIAGNOSIS — G25.0 ESSENTIAL TREMOR: ICD-10-CM

## 2018-11-12 DIAGNOSIS — G35 MULTIPLE SCLEROSIS (HCC): Primary | ICD-10-CM

## 2018-11-12 PROCEDURE — 99214 OFFICE O/P EST MOD 30 MIN: CPT | Performed by: PSYCHIATRY & NEUROLOGY

## 2018-11-12 ASSESSMENT — ENCOUNTER SYMPTOMS
EYES NEGATIVE: 1
GASTROINTESTINAL NEGATIVE: 1
RESPIRATORY NEGATIVE: 1
ALLERGIC/IMMUNOLOGIC NEGATIVE: 1

## 2018-11-12 NOTE — LETTER
[facility] Neurology Specialist  [facility] [place] [address]  Phone: [phone]  Fax: [phone]    Porfirio Garcia*        November 13, 2018       Patient: Julia Atwood   MR Number: [id number]   YOB: 1955   Date of Visit: 11/12/2018       Dear Dr. Thao Ano: Thank you for the request for consultation for Concepcion Ramsey. Below are the relevant portions of my assessment and plan of care. Subjective:      Patient ID: Julia Atwood is a 63 y.o. female. HPI    Active Problem remitting relapsing multiple sclerosis with greater gait imbalance and ataxia on tecfidera having been evaluated at [facility] with postural temor readjusting mysoline dosage . There is elevated anticardiolipin Ab on aspirin. The condition is she reports to be using walker although intermittently having had no falls since last seen in August . She is using tecfidera tolerating this well . Mysoline increase did not help any further in attenuating tremor residual tolerating dosage . There will be left leg giveway after being on her feet over 20 minutes . There is upper extremity and head tremor with only mild attenuation on mysoline by her report . There is no visual or bulbar complaints . There is some occasional urinary urgency with no incontinence . Significant medications tecfidera 240 mg po bid ,  aspirin 81 mg po qd, mysoline 100 mg po bid  , vitamin D 2000 units qd . Previously on avonex. Testing MRI of Head with few foci periventricular white matter supratentorially. The largest is 1.6 cm parietal periventricular area. There is no enhancement . There is also 1 cm right parietal extra axial meningioma, February 2011. MRI of cervical spine with small patchy areas within the cord from C3-4 through C4-5 with no enhancement. There is bulging at C4-5 along with C5-6.  CSF analysis with elevated IgG with

## 2018-11-12 NOTE — PROGRESS NOTES
Hepatitis     Hypertension     Multiple sclerosis ([facility] [address]     Osteopenia     Psoriasis        Past Surgical History:   Procedure Laterality Date    APPENDECTOMY       SECTION      CHOLECYSTECTOMY      COLONOSCOPY  2013, 2015    CYST REMOVAL      FOOT SURGERY  2012    right foot bunionectomy    TONSILLECTOMY AND ADENOIDECTOMY      WRIST FRACTURE SURGERY         Family History   Problem Relation Age of Onset    Cirrhosis Mother     Heart Disease Father        Social History     Social History    Marital status:      Spouse name: N/A    Number of children: N/A    Years of education: N/A     Social History Main Topics    Smoking status: Never Smoker    Smokeless tobacco: Never Used    Alcohol use No    Drug use: No    Sexual activity: Not Asked     Other Topics Concern    None     Social History Narrative    None       Current Outpatient Prescriptions   Medication Sig Dispense Refill    TECFIDERA 240 MG delayed release capsule TAKE 1 CAPSULE BY MOUTH 2 TIMES DAILY 180 capsule 2    potassium chloride (KLOR-CON M) 20 MEQ extended release tablet TAKE 1 TABLET DAILY 90 tablet 2    lisinopril-hydrochlorothiazide (PRINZIDE;ZESTORETIC) 20-25 MG per tablet TAKE 1 TABLET DAILY 90 tablet 2    Cholecalciferol (VITAMIN D3) 2000 units CAPS Take by mouth      vitamin B-12 (CYANOCOBALAMIN) 1000 MCG tablet Take 1,000 mcg by mouth daily      Alpha-Lipoic Acid 600 MG CAPS Take by mouth      cyanocobalamin (CVS VITAMIN B12) 1000 MCG tablet Take 1 tablet by mouth daily 90 tablet 3    Cholecalciferol (VITAMIN D) 2000 units TABS tablet Take 1 tablet by mouth daily 90 tablet 3    ALPRAZolam (XANAX) 0.25 MG tablet TAKE ONE TABLET BY MOUTH DAILY AS NEEDED FOR ANXIETY 25 tablet 0    alendronate (FOSAMAX) 70 MG tablet TAKE 1 TABLET EVERY 7 DAYS 12 tablet 5    primidone (MYSOLINE) 50 MG tablet Take 1po qAM , 3 po qhs 360 tablet 3    dimethyl fumarate (TECFIDERA) 240 MG delayed

## 2018-11-30 ENCOUNTER — HOSPITAL ENCOUNTER (OUTPATIENT)
Age: 63
Setting detail: SPECIMEN
Discharge: HOME | End: 2018-11-30
Payer: COMMERCIAL

## 2018-12-05 LAB — SURGICAL PATHOLOGY REPORT: NORMAL

## 2018-12-10 RX ORDER — AMLODIPINE BESYLATE 5 MG/1
TABLET ORAL
Qty: 90 TABLET | Refills: 1 | Status: SHIPPED | OUTPATIENT
Start: 2018-12-10 | End: 2019-06-08 | Stop reason: SDUPTHER

## 2019-03-18 RX ORDER — PAROXETINE HYDROCHLORIDE 25 MG/1
TABLET, FILM COATED, EXTENDED RELEASE ORAL
Qty: 90 TABLET | Refills: 3 | Status: SHIPPED | OUTPATIENT
Start: 2019-03-18 | End: 2020-03-12

## 2019-03-25 ENCOUNTER — OFFICE VISIT (OUTPATIENT)
Dept: PRIMARY CARE CLINIC | Age: 64
End: 2019-03-25
Payer: COMMERCIAL

## 2019-03-25 VITALS
RESPIRATION RATE: 18 BRPM | DIASTOLIC BLOOD PRESSURE: 86 MMHG | BODY MASS INDEX: 41.76 KG/M2 | HEART RATE: 68 BPM | SYSTOLIC BLOOD PRESSURE: 136 MMHG | HEIGHT: 61 IN | WEIGHT: 221.2 LBS

## 2019-03-25 DIAGNOSIS — G35 MULTIPLE SCLEROSIS (HCC): ICD-10-CM

## 2019-03-25 DIAGNOSIS — E78.5 DYSLIPIDEMIA: ICD-10-CM

## 2019-03-25 DIAGNOSIS — F41.9 ANXIETY: ICD-10-CM

## 2019-03-25 DIAGNOSIS — Z13.1 SCREENING FOR DIABETES MELLITUS: ICD-10-CM

## 2019-03-25 DIAGNOSIS — Z12.31 SCREENING MAMMOGRAM, ENCOUNTER FOR: ICD-10-CM

## 2019-03-25 DIAGNOSIS — I10 BENIGN ESSENTIAL HTN: ICD-10-CM

## 2019-03-25 DIAGNOSIS — Z13.0 SCREENING FOR IRON DEFICIENCY ANEMIA: ICD-10-CM

## 2019-03-25 DIAGNOSIS — Z00.00 ANNUAL PHYSICAL EXAM: Primary | ICD-10-CM

## 2019-03-25 PROCEDURE — 99396 PREV VISIT EST AGE 40-64: CPT | Performed by: NURSE PRACTITIONER

## 2019-03-25 RX ORDER — ALPRAZOLAM 0.25 MG/1
TABLET ORAL
Qty: 25 TABLET | Refills: 0 | Status: SHIPPED | OUTPATIENT
Start: 2019-03-25 | End: 2020-03-31 | Stop reason: SDUPTHER

## 2019-03-25 ASSESSMENT — ENCOUNTER SYMPTOMS
COUGH: 0
SHORTNESS OF BREATH: 0
TROUBLE SWALLOWING: 0
ABDOMINAL PAIN: 0
VOMITING: 0
DIARRHEA: 0
NAUSEA: 0
SINUS PRESSURE: 0
SORE THROAT: 0
CONSTIPATION: 0
BLOOD IN STOOL: 0
WHEEZING: 0

## 2019-03-25 ASSESSMENT — PATIENT HEALTH QUESTIONNAIRE - PHQ9
SUM OF ALL RESPONSES TO PHQ QUESTIONS 1-9: 0
SUM OF ALL RESPONSES TO PHQ QUESTIONS 1-9: 0
2. FEELING DOWN, DEPRESSED OR HOPELESS: 0
SUM OF ALL RESPONSES TO PHQ9 QUESTIONS 1 & 2: 0
1. LITTLE INTEREST OR PLEASURE IN DOING THINGS: 0

## 2019-03-31 LAB
ABSOLUTE BASO #: 0 K/UL (ref 0–0.1)
ABSOLUTE EOS #: 0.2 K/UL (ref 0.1–0.4)
ABSOLUTE LYMPH #: 1.2 K/UL (ref 0.8–5.2)
ABSOLUTE MONO #: 1 K/UL (ref 0.1–0.9)
ABSOLUTE NEUT #: 7.9 K/UL (ref 1.3–9.1)
ALBUMIN SERPL-MCNC: 4.6 G/DL (ref 3.5–5.2)
ALK PHOSPHATASE: 60 U/L (ref 30–121)
ALT SERPL-CCNC: 16 U/L (ref 5–40)
ANION GAP SERPL CALCULATED.3IONS-SCNC: 11 MEQ/L (ref 10–19)
AST SERPL-CCNC: 16 U/L (ref 9–40)
BASOPHILS RELATIVE PERCENT: 0.3 %
BILIRUB SERPL-MCNC: 0.2 MG/DL
BUN BLDV-MCNC: 21 MG/DL (ref 8–23)
CALCIUM SERPL-MCNC: 9.4 MG/DL (ref 8.5–10.5)
CHLORIDE BLD-SCNC: 101 MEQ/L (ref 95–107)
CHOLESTEROL/HDL RATIO: 2.9
CHOLESTEROL: 184 MG/DL
CO2: 28 MEQ/L (ref 19–31)
CREAT SERPL-MCNC: 0.7 MG/DL (ref 0.6–1.3)
EGFR AFRICAN AMERICAN: 108.4 ML/MIN/1.73 M2
EGFR IF NONAFRICAN AMERICAN: 93.5 ML/MIN/1.73 M2
EOSINOPHILS RELATIVE PERCENT: 1.5 %
GLUCOSE: 94 MG/DL (ref 70–99)
HCT VFR BLD CALC: 39.9 % (ref 36–48)
HDLC SERPL-MCNC: 62.6 MG/DL
HEMOGLOBIN: 13.9 G/DL (ref 12–16)
LDL CHOLESTEROL CALCULATED: 105 MG/DL
LDL/HDL RATIO: 1.7
LYMPHOCYTE %: 11.6 %
MCH RBC QN AUTO: 30.6 PG (ref 27–34)
MCHC RBC AUTO-ENTMCNC: 34.8 G/DL (ref 31–36)
MCV RBC AUTO: 87.9 FL (ref 80–100)
MONOCYTES # BLD: 9.6 %
NEUTROPHILS RELATIVE PERCENT: 76.6 %
PDW BLD-RTO: 12.8 % (ref 10.8–14.8)
PLATELETS: 320 K/UL (ref 150–450)
POTASSIUM SERPL-SCNC: 4.9 MEQ/L (ref 3.5–5.4)
RBC: 4.54 M/UL (ref 4–5.5)
SODIUM BLD-SCNC: 140 MEQ/L (ref 135–146)
TOTAL PROTEIN: 7.7 G/DL (ref 6.1–8.3)
TRIGL SERPL-MCNC: 80 MG/DL
VLDLC SERPL CALC-MCNC: 16 MG/DL
WBC: 10.4 K/UL (ref 3.7–10.8)

## 2019-04-01 LAB
AVERAGE GLUCOSE: 105 MG/DL (ref 66–114)
HBA1C MFR BLD: 5.3 %

## 2019-04-02 DIAGNOSIS — Z13.1 SCREENING FOR DIABETES MELLITUS: ICD-10-CM

## 2019-04-02 DIAGNOSIS — E78.5 DYSLIPIDEMIA: ICD-10-CM

## 2019-04-02 DIAGNOSIS — Z13.0 SCREENING FOR IRON DEFICIENCY ANEMIA: ICD-10-CM

## 2019-04-02 DIAGNOSIS — G35 MULTIPLE SCLEROSIS (HCC): ICD-10-CM

## 2019-04-02 DIAGNOSIS — I10 BENIGN ESSENTIAL HTN: ICD-10-CM

## 2019-06-03 RX ORDER — LISINOPRIL AND HYDROCHLOROTHIAZIDE 25; 20 MG/1; MG/1
1 TABLET ORAL DAILY
Qty: 90 TABLET | Refills: 1 | Status: SHIPPED | OUTPATIENT
Start: 2019-06-03 | End: 2019-11-30 | Stop reason: SDUPTHER

## 2019-06-03 NOTE — TELEPHONE ENCOUNTER
Last ov [id number]  Health Maintenance   Topic Date Due    Hepatitis C screen  1955    HIV screen  12/26/1972    DTaP/Tdap/Td vaccine (1 - Tdap) 12/26/1976    Cervical cancer screen  01/01/2003    Shingles Vaccine (1 of 2) 12/26/2007    Breast cancer screen  06/25/2018    Colon cancer screen colonoscopy  05/30/2019    Potassium monitoring  03/30/2020    Creatinine monitoring  03/30/2020    Lipid screen  03/30/2024    Flu vaccine  Completed    Pneumococcal 0-64 years Vaccine  Aged Out             (applicable per patient's age: Cancer Screenings, Depression Screening, Fall Risk Screening, Immunizations)    Hemoglobin A1C (%)   Date Value   03/30/2019 5.3     LDL Cholesterol (mg/dL)   Date Value   03/18/2015 129     LDL Calculated (mg/dL)   Date Value   03/30/2019 105     AST (U/L)   Date Value   03/30/2019 16     ALT (U/L)   Date Value   03/30/2019 16     BUN (mg/dL)   Date Value   03/30/2019 21      (goal A1C is < 7)   (goal LDL is <100) need 30-50% reduction from baseline     BP Readings from Last 3 Encounters:   03/25/19 136/86   11/12/18 123/73   08/22/18 116/75    (goal /80)      All Future Testing planned in [facility]:  Lab Frequency Next Occurrence   IA CANE ADJUST/FIXED QUAD/3 PRO Once 08/04/2018   PT eval and treat Once 12/27/2018   DANIA DIGITAL SCREEN W OR WO CAD BILATERAL Once 09/25/2019       Next Visit Date:  No future appointments.          Patient Active Problem List:     Carpal tunnel syndrome     Multiple sclerosis (HCC)     Hepatitis     Osteopenia     Dyslipidemia     Anxiety     Crohn disease ([facility] [address]     Benign essential HTN

## 2019-06-10 RX ORDER — AMLODIPINE BESYLATE 5 MG/1
TABLET ORAL
Qty: 90 TABLET | Refills: 1 | Status: SHIPPED | OUTPATIENT
Start: 2019-06-10 | End: 2019-12-07 | Stop reason: SDUPTHER

## 2019-06-10 NOTE — TELEPHONE ENCOUNTER
Last OV 3/25/19  Health Maintenance   Topic Date Due    Hepatitis C screen  1955    HIV screen  12/26/1972    DTaP/Tdap/Td vaccine (1 - Tdap) 12/26/1976    Cervical cancer screen  01/01/2003    Shingles Vaccine (1 of 2) 12/26/2007    Breast cancer screen  06/25/2018    Colon cancer screen colonoscopy  05/30/2019    Potassium monitoring  03/30/2020    Creatinine monitoring  03/30/2020    Lipid screen  03/30/2024    Flu vaccine  Completed    Pneumococcal 0-64 years Vaccine  Aged Out             (applicable per patient's age: Cancer Screenings, Depression Screening, Fall Risk Screening, Immunizations)    Hemoglobin A1C (%)   Date Value   03/30/2019 5.3     LDL Cholesterol (mg/dL)   Date Value   03/18/2015 129     LDL Calculated (mg/dL)   Date Value   03/30/2019 105     AST (U/L)   Date Value   03/30/2019 16     ALT (U/L)   Date Value   03/30/2019 16     BUN (mg/dL)   Date Value   03/30/2019 21      (goal A1C is < 7)   (goal LDL is <100) need 30-50% reduction from baseline     BP Readings from Last 3 Encounters:   03/25/19 136/86   11/12/18 123/73   08/22/18 116/75    (goal /80)      All Future Testing planned in [facility]:  Lab Frequency Next Occurrence   OH CANE ADJUST/FIXED QUAD/3 PRO Once 08/04/2018   PT eval and treat Once 12/27/2018   DANIA DIGITAL SCREEN W OR WO CAD BILATERAL Once 09/25/2019       Next Visit Date:  No future appointments.          Patient Active Problem List:     Carpal tunnel syndrome     Multiple sclerosis (HCC)     Hepatitis     Osteopenia     Dyslipidemia     Anxiety     Crohn disease ([facility] [address]     Benign essential HTN

## 2019-07-10 RX ORDER — PRIMIDONE 50 MG/1
TABLET ORAL
Qty: 360 TABLET | Refills: 3 | Status: SHIPPED | OUTPATIENT
Start: 2019-07-10 | End: 2020-06-04 | Stop reason: SDUPTHER

## 2019-08-14 DIAGNOSIS — G35 MULTIPLE SCLEROSIS (HCC): ICD-10-CM

## 2019-08-15 RX ORDER — DIMETHYL FUMARATE 240 MG/1
CAPSULE ORAL
Qty: 180 CAPSULE | Refills: 0 | Status: SHIPPED | OUTPATIENT
Start: 2019-08-15 | End: 2019-09-27 | Stop reason: ALTCHOICE

## 2019-09-05 RX ORDER — ALENDRONATE SODIUM 70 MG/1
TABLET ORAL
Qty: 12 TABLET | Refills: 4 | Status: SHIPPED | OUTPATIENT
Start: 2019-09-05

## 2019-09-27 ENCOUNTER — OFFICE VISIT (OUTPATIENT)
Dept: NEUROLOGY | Age: 64
End: 2019-09-27
Payer: COMMERCIAL

## 2019-09-27 VITALS
HEIGHT: 61 IN | WEIGHT: 203.4 LBS | SYSTOLIC BLOOD PRESSURE: 133 MMHG | BODY MASS INDEX: 38.4 KG/M2 | DIASTOLIC BLOOD PRESSURE: 82 MMHG | HEART RATE: 57 BPM

## 2019-09-27 DIAGNOSIS — G35 MULTIPLE SCLEROSIS (HCC): ICD-10-CM

## 2019-09-27 PROCEDURE — 99214 OFFICE O/P EST MOD 30 MIN: CPT | Performed by: PSYCHIATRY & NEUROLOGY

## 2019-09-27 RX ORDER — PRIMIDONE 50 MG/1
TABLET ORAL
Qty: 450 TABLET | Refills: 3 | Status: SHIPPED | OUTPATIENT
Start: 2019-09-27 | End: 2020-06-04

## 2019-09-27 RX ORDER — DIMETHYL FUMARATE 240 MG/1
CAPSULE ORAL
Qty: 180 CAPSULE | Refills: 3 | Status: SHIPPED | OUTPATIENT
Start: 2019-09-27 | End: 2020-06-04 | Stop reason: SDUPTHER

## 2019-09-27 ASSESSMENT — ENCOUNTER SYMPTOMS
ALLERGIC/IMMUNOLOGIC NEGATIVE: 1
RESPIRATORY NEGATIVE: 1
EYES NEGATIVE: 1
GASTROINTESTINAL NEGATIVE: 1

## 2019-09-27 NOTE — PROGRESS NOTES
Subjective:      Patient ID: Radha Murillo is a 66 y.o. female. HPI    Active Problem remitting relapsing multiple sclerosis with imbalance and ataxia on tecfidera having been evaluated at [facility] with postural temor on mysoline . There is elevated anticardiolipin Ab on aspirin. The condition is she reports ongoing stable imbalance using walker with no falls. She reports more tremor of her arms left more than right and head tremor on mysoline 100 mg po bid  . She is using tecfidera tolerating this well . Mysoline has had some tremor attenuation . There will be left leg giveway after being on her feet over 20 minutes . There is no visual or bulbar complaints . There is some occasional urinary urgency with no incontinence . Significant medications tecfidera 240 mg po bid ,  aspirin 81 mg po qd, mysoline 100 mg po bid  , vitamin D 2000 units qd . Previously on avonex. Testing MRI of Head with few foci periventricular white matter supratentorially. The largest is 1.6 cm parietal periventricular area. There is no enhancement . There is also 1 cm right parietal extra axial meningioma, February 2011. MRI of cervical spine with small patchy areas within the cord from C3-4 through C4-5 with no enhancement. There is bulging at C4-5 along with C5-6. CSF analysis with elevated IgG with oligoclonal bands. MORIAH 1:140, anticardiolipin IgM 66 . Primidone level 7.5 , January 2016 . Folate 10 , B12 263, primidone 6.0 (5-12) . MRI of Head with no enhancement . There are disc protrusions and spondylosis with mild spinal stenosis ,June 2017 .  MRI of cervical spine with multifocal cord signal abnormalities with no enhancement ilateral periventricular and deep white matter T2  hyperintensities , June 2017      Past Medical History:   Diagnosis Date    Crohn's disease ([address]     Diverticulosis     Hepatitis     Hypertension     Multiple sclerosis ([facility] [address]     Osteopenia     Psoriasis        Past Surgical

## 2019-09-27 NOTE — LETTER
Primidone level 7.5 , 2016 . Folate 10 , B12 263, primidone 6.0 (5-12) . MRI of Head with no enhancement . There are disc protrusions and spondylosis with mild spinal stenosis ,2017 .  MRI of cervical spine with multifocal cord signal abnormalities with no enhancement ilateral periventricular and deep white matter T2  hyperintensities , 2017      Past Medical History:   Diagnosis Date    Cro[facility]'s disease ([facility] [address]     Diverticulosis     Hepatitis     Hypertension     Multiple sclerosis ([facility] [address]     Osteopenia     Psoriasis        Past Surgical History:   Procedure Laterality Date    APPENDECTOMY       SECTION      CHOLECYSTECTOMY      COLONOSCOPY  2013, 2015    CYST REMOVAL      FOOT SURGERY  2012    right foot bunionectomy    TONSILLECTOMY AND ADENOIDECTOMY      WRIST FRACTURE SURGERY         Family History   Problem Relation Age of Onset    Cirrhosis Mother     Heart Disease Father        Social History     Socioeconomic History    Marital status:      Spouse name: None    Number of children: None    Years of education: None    Highest education level: None   Occupational History    None   Social Needs    Financial resource strain: None    Food insecurity:     Worry: None     Inability: None    Transportation needs:     Medical: None     Non-medical: None   Tobacco Use    Smoking status: Never Smoker    Smokeless tobacco: Never Used   Substance and Sexual Activity    Alcohol use: No     Alcohol/week: 0.0 standard drinks    Drug use: No    Sexual activity: None   Lifestyle    Physical activity:     Days per week: None     Minutes per session: None    Stress: None   Relationships    Social connections:     Talks on phone: None     Gets together: None     Attends Rastafarian service: None     Active member of club or organization: None     Attends meetings of clubs or organizations: None     Relationship status: None  Intimate partner violence:     Fear of current or ex partner: None     Emotionally abused: None     Physically abused: None     Forced sexual activity: None   Other Topics Concern    None   Social History Narrative    None       Current Outpatient Medications   Medication Sig Dispense Refill    primidone (MYSOLINE) 50 MG tablet Take 2 po q AM , 3 po qhs 450 tablet 3    dimethyl fumarate (TECFIDERA) 240 MG delayed release capsule Take 1 capsule by mouth 2 times daily. 180 capsule 3    alendronate (FOSAMAX) 70 MG tablet TAKE 1 TABLET EVERY 7 DAYS 12 tablet 4    primidone (MYSOLINE) 50 MG tablet TAKE 1 TABLET EVERY MORNING AND 3 TABLETS AT BEDTIME 360 tablet 3    amLODIPine (NORVASC) 5 MG tablet TAKE 1 TABLET DAILY 90 tablet 1    lisinopril-hydrochlorothiazide (PRINZIDE;ZESTORETIC) 20-25 MG per tablet Take 1 tablet by mouth daily TAKE 1 TABLET DAILY 90 tablet 1    ALPRAZolam (XANAX) 0.25 MG tablet TAKE ONE TABLET BY MOUTH DAILY AS NEEDED FOR ANXIETY 25 tablet 0    PARoxetine (PAXIL CR) 25 MG extended release tablet TAKE 1 TABLET EVERY MORNING 90 tablet 3    potassium chloride (KLOR-CON M) 20 MEQ extended release tablet TAKE 1 TABLET DAILY 90 tablet 2    Cholecalciferol (VITAMIN D3) 2000 units CAPS Take by mouth      Alpha-Lipoic Acid 600 MG CAPS Take by mouth      cyanocobalamin (CVS VITAMIN B12) 1000 MCG tablet Take 1 tablet by mouth daily 90 tablet 3    Cholecalciferol (VITAMIN D) 2000 units TABS tablet Take 1 tablet by mouth daily 90 tablet 3    LIALDA 1.2 G EC tablet       aspirin 81 MG EC tablet Take 81 mg by mouth daily. No current facility-administered medications for this visit. No Known Allergies        Review of Systems   Constitutional: Negative. HENT: Negative. Eyes: Negative. Respiratory: Negative. Cardiovascular: Negative. Gastrointestinal: Negative. Endocrine: Negative. Genitourinary: Positive for frequency and urgency. Musculoskeletal: Positive for gait problem. Skin: Negative. Allergic/Immunologic: Negative. Neurological: Positive for tremors and weakness. Hematological: Negative. Psychiatric/Behavioral: The patient is nervous/anxious. Objective:   Physical Exam    Vitals:    09/27/19 0902   BP: 133/82   Pulse: 57       Neurological Examination  Constitutional .General exam well groomed   Ears /Nose/Throat: external ear . Normal exam  Neck and thyroid . Normal size  Respiratory . Breathsounds clear bilaterally  Cardiovascular: Auscultation of heart with regular rate and rhythm and normal heart sounds  Musculoskeletal. Muscle bulk and tone normal                                                               Muscle strength mild giveway all limbs left more than right                                                                               No dysmetria or dysdiadokinesis  Mild postural head and arm tremor   Normal fine motor  Gait ataxia  Orientation Alert and oriented x 3   Language process and speech normal . No aphasia   Cranial nerve 2 normal acuety and visual fields  Cranial nerve 3, 4 and 6 . Extraocular muscles are intact . Pupils are equal and reactive   Cranial nerve 5 . Normal strength of masseter and temporalis . Intact corneal reflex. Normal facial sensation  Cranial nerve 7 normal exam   Cranial nerve 8. Grossly intact hearing   Cranial nerve 9 and 10. Symmetric palate elevation   Cranial nerve 11 , 5 out of 5 strength   Cranial Nerve 12 midline tongue . No atrophy  Sensation . Normal proprioception . Intact Vibration . Normal pinprick and light touch   Deep Tendon Reflexes normal  Plantar response equivocal bilaterally      Assessment:       1.  Multiple sclerosis    Will readjust mysoline to 100 mg po qAM , 150 mg po qhs otherwise leaving medication regimen unchanged to have FU MRI of Head         Orders Placed This Encounter   Procedures    MRI Brain W WO Contrast     Standing Status:   Future

## 2019-09-27 NOTE — COMMUNICATION BODY
Subjective:      Patient ID: Squire Mcardle is a 66 y.o. female. HPI    Active Problem remitting relapsing multiple sclerosis with imbalance and ataxia on tecfidera having been evaluated at [facility] with postural temor on mysoline . There is elevated anticardiolipin Ab on aspirin. The condition is she reports ongoing stable imbalance using walker with no falls. She reports more tremor of her arms left more than right and head tremor on mysoline 100 mg po bid  . She is using tecfidera tolerating this well . Mysoline has had some tremor attenuation . There will be left leg giveway after being on her feet over 20 minutes . There is no visual or bulbar complaints . There is some occasional urinary urgency with no incontinence . Significant medications tecfidera 240 mg po bid ,  aspirin 81 mg po qd, mysoline 100 mg po bid  , vitamin D 2000 units qd . Previously on avonex. Testing MRI of Head with few foci periventricular white matter supratentorially. The largest is 1.6 cm parietal periventricular area. There is no enhancement . There is also 1 cm right parietal extra axial meningioma, February 2011. MRI of cervical spine with small patchy areas within the cord from C3-4 through C4-5 with no enhancement. There is bulging at C4-5 along with C5-6. CSF analysis with elevated IgG with oligoclonal bands. MORIAH 1:140, anticardiolipin IgM 66 . Primidone level 7.5 , January 2016 . Folate 10 , B12 263, primidone 6.0 (5-12) . MRI of Head with no enhancement . There are disc protrusions and spondylosis with mild spinal stenosis ,June 2017 .  MRI of cervical spine with multifocal cord signal abnormalities with no enhancement ilateral periventricular and deep white matter T2  hyperintensities , June 2017      Past Medical History:   Diagnosis Date    Crohn's disease ([address]     Diverticulosis     Hepatitis     Hypertension     [facility] sclerosis [facility] [address]     Osteopenia     Psoriasis        Past Surgical History:   Procedure Laterality Date    APPENDECTOMY       SECTION      CHOLECYSTECTOMY      COLONOSCOPY  2013, 2015    CYST REMOVAL      FOOT SURGERY  2012    right foot bunionectomy    TONSILLECTOMY AND ADENOIDECTOMY      WRIST FRACTURE SURGERY         Family History   Problem Relation Age of Onset    Cirrhosis Mother     Heart Disease Father        Social History     Socioeconomic History    Marital status:      Spouse name: None    Number of children: None    Years of education: None    Highest education level: None   Occupational History    None   Social Needs    Financial resource strain: None    Food insecurity:     Worry: None     Inability: None    Transportation needs:     Medical: None     Non-medical: None   Tobacco Use    Smoking status: Never Smoker    Smokeless tobacco: Never Used   Substance and Sexual Activity    Alcohol use: No     Alcohol/week: 0.0 standard drinks    Drug use: No    Sexual activity: None   Lifestyle    Physical activity:     Days per week: None     Minutes per session: None    Stress: None   Relationships    Social connections:     Talks on phone: None     Gets together: None     Attends Bahai service: None     Active member of club or organization: None     Attends meetings of clubs or organizations: None     Relationship status: None    Intimate partner violence:     Fear of current or ex partner: None     Emotionally abused: None     Physically abused: None     Forced sexual activity: None   Other Topics Concern    None   Social History Narrative    None       Current Outpatient Medications   Medication Sig Dispense Refill    primidone (MYSOLINE) 50 MG tablet Take 2 po q AM , 3 po qhs 450 tablet 3    dimethyl fumarate (TECFIDERA) 240 MG delayed release capsule Take 1 capsule by mouth 2 times daily.  180 capsule 3    alendronate (FOSAMAX) 70 MG tablet TAKE 1 TABLET EVERY 7 DAYS 12 tablet 4    primidone (MYSOLINE) sounds  Musculoskeletal. Muscle bulk and tone normal                                                               Muscle strength mild giveway all limbs left more than right                                                                               No dysmetria or dysdiadokinesis  Mild postural head and arm tremor   Normal fine motor  Gait ataxia  Orientation Alert and oriented x 3   Language process and speech normal . No aphasia   Cranial nerve 2 normal acuety and visual fields  Cranial nerve 3, 4 and 6 . Extraocular muscles are intact . Pupils are equal and reactive   Cranial nerve 5 . Normal strength of masseter and temporalis . Intact corneal reflex. Normal facial sensation  Cranial nerve 7 normal exam   Cranial nerve 8. Grossly intact hearing   Cranial nerve 9 and 10. Symmetric palate elevation   Cranial nerve 11 , 5 out of 5 strength   Cranial Nerve 12 midline tongue . No atrophy  Sensation . Normal proprioception . Intact Vibration . Normal pinprick and light touch   Deep Tendon Reflexes normal  Plantar response equivocal bilaterally      Assessment:       1.  Multiple sclerosis    Will readjust mysoline to 100 mg po qAM , 150 mg po qhs otherwise leaving medication regimen unchanged to have FU MRI of Head         Orders Placed This Encounter   Procedures    MRI Brain W WO Contrast     Standing Status:   Future     Standing Expiration Date:   9/26/2020    ALT     Standing Status:   Future     Standing Expiration Date:   9/26/2020    AST     Standing Status:   Future     Standing Expiration Date:   9/26/2020    Primidone level     Standing Status:   Future     Standing Expiration Date:   9/26/2020    CBC With Auto Differential     Standing Status:   Future     Standing Expiration Date:   9/27/2020

## 2019-10-11 ENCOUNTER — HOSPITAL ENCOUNTER (OUTPATIENT)
Dept: MRI IMAGING | Age: 64
Discharge: HOME | End: 2019-10-13
Payer: COMMERCIAL

## 2019-10-11 ENCOUNTER — HOSPITAL ENCOUNTER (OUTPATIENT)
Age: 64
Discharge: HOME | End: 2019-10-11
Payer: COMMERCIAL

## 2019-10-11 DIAGNOSIS — G35 MULTIPLE SCLEROSIS (HCC): ICD-10-CM

## 2019-10-11 LAB
ABSOLUTE EOS #: 0.15 K/UL (ref 0–0.44)
ABSOLUTE IMMATURE GRANULOCYTE: 0.06 K/UL (ref 0–0.3)
ABSOLUTE LYMPH #: 1.7 K/UL (ref 1.1–3.7)
ABSOLUTE MONO #: 0.86 K/UL (ref 0.1–1.2)
ALT SERPL-CCNC: 11 U/L (ref 5–33)
AST SERPL-CCNC: 19 U/L
BASOPHILS # BLD: 1 % (ref 0–2)
BASOPHILS ABSOLUTE: 0.04 K/UL (ref 0–0.2)
BUN BLDV-MCNC: 15 MG/DL (ref 8–23)
CREAT SERPL-MCNC: 0.65 MG/DL (ref 0.5–0.9)
DIFFERENTIAL TYPE: ABNORMAL
EOSINOPHILS RELATIVE PERCENT: 2 % (ref 1–4)
GFR AFRICAN AMERICAN: >60 ML/MIN
GFR NON-AFRICAN AMERICAN: >60 ML/MIN
GFR SERPL CREATININE-BSD FRML MDRD: NORMAL ML/MIN/{1.73_M2}
GFR SERPL CREATININE-BSD FRML MDRD: NORMAL ML/MIN/{1.73_M2}
HCT VFR BLD CALC: 45.2 % (ref 36.3–47.1)
HEMOGLOBIN: 14.2 G/DL (ref 11.9–15.1)
IMMATURE GRANULOCYTES: 1 %
LYMPHOCYTES # BLD: 20 % (ref 24–43)
MCH RBC QN AUTO: 30.7 PG (ref 25.2–33.5)
MCHC RBC AUTO-ENTMCNC: 31.4 G/DL (ref 28.4–34.8)
MCV RBC AUTO: 97.8 FL (ref 82.6–102.9)
MONOCYTES # BLD: 10 % (ref 3–12)
NRBC AUTOMATED: 0 PER 100 WBC
PDW BLD-RTO: 13.5 % (ref 11.8–14.4)
PLATELET # BLD: 373 K/UL (ref 138–453)
PLATELET ESTIMATE: ABNORMAL
PMV BLD AUTO: 10.3 FL (ref 8.1–13.5)
RBC # BLD: 4.62 M/UL (ref 3.95–5.11)
RBC # BLD: ABNORMAL 10*6/UL
SEG NEUTROPHILS: 66 % (ref 36–65)
SEGMENTED NEUTROPHILS ABSOLUTE COUNT: 5.57 K/UL (ref 1.5–8.1)
WBC # BLD: 8.4 K/UL (ref 3.5–11.3)
WBC # BLD: ABNORMAL 10*3/UL

## 2019-10-11 PROCEDURE — A9579 GAD-BASE MR CONTRAST NOS,1ML: HCPCS | Performed by: PSYCHIATRY & NEUROLOGY

## 2019-10-11 PROCEDURE — 80184 ASSAY OF PHENOBARBITAL: CPT

## 2019-10-11 PROCEDURE — 84520 ASSAY OF UREA NITROGEN: CPT

## 2019-10-11 PROCEDURE — 85025 COMPLETE CBC W/AUTO DIFF WBC: CPT

## 2019-10-11 PROCEDURE — 70553 MRI BRAIN STEM W/O & W/DYE: CPT

## 2019-10-11 PROCEDURE — 6360000004 HC RX CONTRAST MEDICATION: Performed by: PSYCHIATRY & NEUROLOGY

## 2019-10-11 PROCEDURE — 84450 TRANSFERASE (AST) (SGOT): CPT

## 2019-10-11 PROCEDURE — 82565 ASSAY OF CREATININE: CPT

## 2019-10-11 PROCEDURE — 80188 ASSAY OF PRIMIDONE: CPT

## 2019-10-11 PROCEDURE — 84460 ALANINE AMINO (ALT) (SGPT): CPT

## 2019-10-11 PROCEDURE — 36415 COLL VENOUS BLD VENIPUNCTURE: CPT

## 2019-10-11 RX ADMIN — GADOTERIDOL 18 ML: 279.3 INJECTION, SOLUTION INTRAVENOUS at 11:33

## 2019-10-13 LAB
PHENOBARBITAL: 8.8 UG/ML (ref 15–40)
PRIMIDONE LEVEL: 9.4 UG/ML (ref 5–12)

## 2019-11-18 RX ORDER — POTASSIUM CHLORIDE 20 MEQ/1
TABLET, EXTENDED RELEASE ORAL
Qty: 90 TABLET | Refills: 4 | Status: SHIPPED | OUTPATIENT
Start: 2019-11-18 | End: 2021-02-10

## 2019-12-02 RX ORDER — LISINOPRIL AND HYDROCHLOROTHIAZIDE 25; 20 MG/1; MG/1
1 TABLET ORAL DAILY
Qty: 90 TABLET | Refills: 0 | Status: SHIPPED | OUTPATIENT
Start: 2019-12-02 | End: 2020-03-01

## 2019-12-09 RX ORDER — AMLODIPINE BESYLATE 5 MG/1
TABLET ORAL
Qty: 90 TABLET | Refills: 0 | Status: SHIPPED | OUTPATIENT
Start: 2019-12-09 | End: 2020-03-09

## 2020-03-09 RX ORDER — AMLODIPINE BESYLATE 5 MG/1
TABLET ORAL
Qty: 90 TABLET | Refills: 3 | Status: SHIPPED | OUTPATIENT
Start: 2020-03-09 | End: 2021-03-02

## 2020-03-09 NOTE — TELEPHONE ENCOUNTER
Last OV 03/25/2019    Health Maintenance   Topic Date Due    Hepatitis C screen  1955    HIV screen  12/26/1972    DTaP/Tdap/Td vaccine (1 - Tdap) 12/26/1976    Cervical cancer screen  01/01/2003    Shingles Vaccine (1 of 2) 12/26/2007    Breast cancer screen  06/25/2018    Colon cancer screen colonoscopy  05/30/2019    Potassium monitoring  03/30/2020    Creatinine monitoring  10/11/2020    Lipid screen  03/30/2024    Flu vaccine  Completed    Hepatitis A vaccine  Aged Out    Hepatitis B vaccine  Aged Out    Hib vaccine  Aged Out    Meningococcal (ACWY) vaccine  Aged Out    Pneumococcal 0-64 years Vaccine  Aged Out             (applicable per patient's age: Cancer Screenings, Depression Screening, Fall Risk Screening, Immunizations)    Hemoglobin A1C (%)   Date Value   03/30/2019 5.3     LDL Cholesterol (mg/dL)   Date Value   03/18/2015 129     LDL Calculated (mg/dL)   Date Value   03/30/2019 105     AST (U/L)   Date Value   10/11/2019 19     ALT (U/L)   Date Value   10/11/2019 11     BUN (mg/dL)   Date Value   10/11/2019 15      (goal A1C is < 7)   (goal LDL is <100) need 30-50% reduction from baseline     BP Readings from Last 3 Encounters:   09/27/19 133/82   03/25/19 136/86   11/12/18 123/73    (goal /80)      All Future Testing planned in [facility]:  Lab Frequency Next Occurrence   DANIA DIGITAL SCREEN W OR WO CAD BILATERAL Once 03/25/2020       Next Visit Date:  Future Appointments   Date Time Provider Nabila Moeller   3/30/2020  9:00 AM Regina Devine MD Neuro Spec [facility]            Patient Active Problem List:     Carpal tunnel syndrome     Multiple sclerosis ([address]     Hepatitis     Osteopenia     Dyslipidemia     Anxiety     Crohn disease ([address]     Benign essential HTN

## 2020-03-31 RX ORDER — ALPRAZOLAM 0.25 MG/1
TABLET ORAL
Qty: 25 TABLET | Refills: 0 | Status: SHIPPED | OUTPATIENT
Start: 2020-03-31 | End: 2021-04-02 | Stop reason: SDUPTHER

## 2020-03-31 NOTE — PROGRESS NOTES
While completing virtual visit with spouse patient requested refill on xanax. She is worried about the current health crisis and has been having difficulty sleeping. Has use xanax in the past and worked well for her.

## 2020-06-04 ENCOUNTER — OFFICE VISIT (OUTPATIENT)
Dept: NEUROLOGY | Age: 65
End: 2020-06-04
Payer: COMMERCIAL

## 2020-06-04 VITALS
SYSTOLIC BLOOD PRESSURE: 133 MMHG | HEART RATE: 64 BPM | WEIGHT: 203 LBS | HEIGHT: 61 IN | BODY MASS INDEX: 38.33 KG/M2 | DIASTOLIC BLOOD PRESSURE: 84 MMHG | TEMPERATURE: 98.1 F

## 2020-06-04 PROCEDURE — 99214 OFFICE O/P EST MOD 30 MIN: CPT | Performed by: PSYCHIATRY & NEUROLOGY

## 2020-06-04 RX ORDER — PRIMIDONE 50 MG/1
TABLET ORAL
Qty: 360 TABLET | Refills: 3 | Status: SHIPPED | OUTPATIENT
Start: 2020-06-04 | End: 2021-04-28 | Stop reason: SDUPTHER

## 2020-06-04 RX ORDER — DIMETHYL FUMARATE 240 MG/1
CAPSULE ORAL
Qty: 180 CAPSULE | Refills: 3 | Status: SHIPPED | OUTPATIENT
Start: 2020-06-04 | End: 2022-06-06

## 2020-06-04 RX ORDER — CHOLECALCIFEROL (VITAMIN D3) 50 MCG
2000 TABLET ORAL DAILY
Qty: 90 TABLET | Refills: 3 | Status: SHIPPED | OUTPATIENT
Start: 2020-06-04 | End: 2021-03-02 | Stop reason: SDUPTHER

## 2020-06-04 ASSESSMENT — ENCOUNTER SYMPTOMS
GASTROINTESTINAL NEGATIVE: 1
ALLERGIC/IMMUNOLOGIC NEGATIVE: 1
EYES NEGATIVE: 1
RESPIRATORY NEGATIVE: 1

## 2020-06-04 NOTE — PROGRESS NOTES
periventricular corona radiata  T2 FLAIR signals within supratentorial compartment with no enhancement , 2019      Past Medical History:   Diagnosis Date    [facility] [facility]     Diverticulosis     Hepatitis     Hypertension     Multiple sclerosis ([facility] [facility]     Osteopenia     Psoriasis        Past Surgical History:   Procedure Laterality Date    APPENDECTOMY       SECTION      CHOLECYSTECTOMY      COLONOSCOPY  2013, 2015    CYST REMOVAL      FOOT SURGERY  2012    right foot bunionectomy    TONSILLECTOMY AND ADENOIDECTOMY      WRIST FRACTURE SURGERY         Family History   Problem Relation Age of Onset    Cirrhosis Mother     Heart Disease Father        Social History     Socioeconomic History    Marital status:      Spouse name: Not on file    Number of children: Not on file    Years of education: Not on file    Highest education level: Not on file   Occupational History    Not on file   Social Needs    Financial resource strain: Not on file    Food insecurity     Worry: Not on file     Inability: Not on file    Transportation needs     Medical: Not on file     Non-medical: Not on file   Tobacco Use    Smoking status: Never Smoker    Smokeless tobacco: Never Used   Substance and Sexual Activity    Alcohol use: No     Alcohol/week: 0.0 standard drinks    Drug use: No    Sexual activity: Not on file   Lifestyle    Physical activity     Days per week: Not on file     Minutes per session: Not on file    Stress: Not on file   Relationships    Social connections     Talks on phone: Not on file     Gets together: Not on file     Attends Episcopalian service: Not on file     Active member of club or organization: Not on file     Attends meetings of clubs or organizations: Not on file     Relationship status: Not on file    Intimate partner violence     Fear of current or ex partner: Not on file     Emotionally abused: Not on file     Physically abused: Not on file     Forced sexual activity: Not on file   Other Topics Concern    Not on file   Social History Narrative    Not on file       Current Outpatient Medications   Medication Sig Dispense Refill    Cholecalciferol (VITAMIN D) 50 MCG (2000 UT) TABS tablet Take 1 tablet by mouth daily 90 tablet 3    primidone (MYSOLINE) 50 MG tablet Take 2 po bid 360 tablet 3    dimethyl fumarate (TECFIDERA) 240 MG delayed release capsule Take 1 capsule by mouth 2 times daily. 180 capsule 3    ALPRAZolam (XANAX) 0.25 MG tablet TAKE ONE TABLET BY MOUTH DAILY AS NEEDED FOR ANXIETY 25 tablet 0    PARoxetine (PAXIL CR) 25 MG extended release tablet TAKE 1 TABLET EVERY MORNING 90 tablet 3    amLODIPine (NORVASC) 5 MG tablet TAKE 1 TABLET DAILY (NEED APPOINTMENT BEFORE FURTHER REFILLS) 90 tablet 3    lisinopril-hydroCHLOROthiazide (PRINZIDE;ZESTORETIC) 20-25 MG per tablet TAKE 1 TABLET DAILY 90 tablet 3    potassium chloride (KLOR-CON M) 20 MEQ extended release tablet TAKE 1 TABLET DAILY 90 tablet 4    alendronate (FOSAMAX) 70 MG tablet TAKE 1 TABLET EVERY 7 DAYS 12 tablet 4    Cholecalciferol (VITAMIN D3) 2000 units CAPS Take by mouth      Alpha-Lipoic Acid 600 MG CAPS Take by mouth      cyanocobalamin ([employer] VITAMIN B12) 1000 MCG tablet Take 1 tablet by mouth daily 90 tablet 3    LIALDA 1.2 G EC tablet       aspirin 81 MG EC tablet Take 81 mg by mouth daily. No current facility-administered medications for this visit. No Known Allergies        Review of Systems   Constitutional: Negative. HENT: Negative. Eyes: Negative. Respiratory: Negative. Cardiovascular: Negative. Gastrointestinal: Negative. Endocrine: Negative. Genitourinary: Positive for urgency. Musculoskeletal: Positive for gait problem. Skin: Negative. Allergic/Immunologic: Negative. Neurological: Positive for tremors and weakness. Hematological: Negative.     Psychiatric/Behavioral: The patient is

## 2020-09-08 ENCOUNTER — TELEPHONE (OUTPATIENT)
Dept: NEUROLOGY | Age: 65
End: 2020-09-08

## 2020-09-08 NOTE — TELEPHONE ENCOUNTER
Tecfidera prior auth completed through [facility]. Tecfidera was approved from 8/9/20 through 9/8/21 - case #[id number].

## 2020-11-08 LAB
ALT SERPL-CCNC: 14 U/L (ref 0–31)
AST SERPL-CCNC: 18 U/L (ref 0–41)
BANDS PRESENT: 1.9 %
HCT VFR BLD CALC: 41.1 % (ref 35–47)
HEMOGLOBIN: 13.8 G/DL (ref 11.7–15.5)
LYMPHOCYTES # BLD: 12.4 %
LYMPHOCYTES ABSOLUTE: 0.9 X10E9/L (ref 1–3.5)
MCH RBC QN AUTO: 30.9 PG (ref 27–34)
MCHC RBC AUTO-ENTMCNC: 33.5 G/DL (ref 32–36)
MCV RBC AUTO: 92 FL (ref 80–100)
MONOCYTES # BLD: 8.6 %
MONOCYTES ABSOLUTE: 0.6 X10E9/L (ref 0–0.9)
NEUTROPHILS ABSOLUTE: 5.9 X10E9/L (ref 1.5–6.6)
NEUTROPHILS RELATIVE PERCENT: 77.1 %
PDW BLD-RTO: 13.8 % (ref 11.5–15)
PLATELETS: 337 X10E9/L (ref 150–450)
PMV BLD AUTO: 8.6 FL (ref 7–12)
RBC # BLD: ABNORMAL 10*6/UL
RBC: 4.46 X10E12/L (ref 3.8–5.2)
WBC: 7.5 X10E9/L (ref 4–11)

## 2021-02-10 RX ORDER — POTASSIUM CHLORIDE 1500 MG/1
TABLET, EXTENDED RELEASE ORAL
Qty: 90 TABLET | Refills: 0 | Status: SHIPPED | OUTPATIENT
Start: 2021-02-10 | End: 2021-05-11

## 2021-02-24 RX ORDER — LISINOPRIL AND HYDROCHLOROTHIAZIDE 25; 20 MG/1; MG/1
TABLET ORAL
Qty: 90 TABLET | Refills: 3 | Status: SHIPPED | OUTPATIENT
Start: 2021-02-24 | End: 2022-01-27

## 2021-03-02 RX ORDER — PAROXETINE HYDROCHLORIDE 25 MG/1
TABLET, FILM COATED, EXTENDED RELEASE ORAL
Qty: 90 TABLET | Refills: 3 | Status: SHIPPED | OUTPATIENT
Start: 2021-03-02 | End: 2022-02-25

## 2021-03-02 RX ORDER — AMLODIPINE BESYLATE 5 MG/1
TABLET ORAL
Qty: 90 TABLET | Refills: 3 | Status: SHIPPED | OUTPATIENT
Start: 2021-03-02 | End: 2021-03-02 | Stop reason: SDUPTHER

## 2021-03-02 RX ORDER — CHOLECALCIFEROL (VITAMIN D3) 50 MCG
2000 TABLET ORAL DAILY
Qty: 90 TABLET | Refills: 3 | Status: SHIPPED | OUTPATIENT
Start: 2021-03-02 | End: 2021-04-02 | Stop reason: SDUPTHER

## 2021-03-02 RX ORDER — AMLODIPINE BESYLATE 5 MG/1
TABLET ORAL
Qty: 90 TABLET | Refills: 3 | Status: SHIPPED | OUTPATIENT
Start: 2021-03-02 | End: 2021-04-02 | Stop reason: SDUPTHER

## 2021-03-02 NOTE — TELEPHONE ENCOUNTER
LOV 3/25/19    Health Maintenance   Topic Date Due    Hepatitis C screen  Never done    HIV screen  Never done    DTaP/Tdap/Td vaccine (1 - Tdap) Never done    Cervical cancer screen  01/01/2003    Shingles Vaccine (1 of 2) Never done    Breast cancer screen  06/25/2018    Colon cancer screen colonoscopy  05/30/2019    Potassium monitoring  03/30/2020    Creatinine monitoring  10/11/2020    Lipid screen  03/30/2024    Flu vaccine  Completed    Hepatitis A vaccine  Aged Out    Hepatitis B vaccine  Aged Out    Hib vaccine  Aged Out    Meningococcal (ACWY) vaccine  Aged Out    Pneumococcal 0-64 years Vaccine  Aged Out             (applicable per patient's age: Cancer Screenings, Depression Screening, Fall Risk Screening, Immunizations)    Hemoglobin A1C (%)   Date Value   03/30/2019 5.3     LDL Cholesterol (mg/dL)   Date Value   03/18/2015 129     LDL Calculated (mg/dL)   Date Value   03/30/2019 105     AST (U/L)   Date Value   11/07/2020 18     ALT (U/L)   Date Value   11/07/2020 14     BUN (mg/dL)   Date Value   10/11/2019 15      (goal A1C is < 7)   (goal LDL is <100) need 30-50% reduction from baseline     BP Readings from Last 3 Encounters:   06/04/20 133/84   09/27/19 133/82   03/25/19 136/86    (goal /80)      All Future Testing planned in [facility]:  Lab Frequency Next Occurrence       Next Visit Date:  Future Appointments   Date Time Provider Nabila Groveisti   4/2/2021 11:20 AM SJ Sage - CNP [facility]   6/8/2021 10:40 AM Rodrigo Celeste MD Neuro Spec [facility]            Patient Active Problem List:     Carpal tunnel syndrome     Multiple sclerosis ([address]     Hepatitis     Osteopenia     Dyslipidemia     Anxiety     Crohn disease ([facility] [address]     Benign essential HTN

## 2021-03-02 NOTE — TELEPHONE ENCOUNTER
Patient calling for refill of Primidone. Medication active on med list yes      Date of last fill: 6/4/20  verified on 3/2/2021   verified by [facility] LPN      Date of last appointment 6/4/20    Next Visit Date:  6/8/2021    Pt called in stating her [facility] order will not be shipped out until tomorrow, 3/3/21. She is asking if we can send in a 7-10 day supply to her local pharmacy.

## 2021-03-02 NOTE — TELEPHONE ENCOUNTER
Last visit: 3/25/19      Next visit: 4/2/21    Health Maintenance   Topic Date Due    Hepatitis C screen  Never done    HIV screen  Never done    DTaP/Tdap/Td vaccine (1 - Tdap) Never done    Cervical cancer screen  01/01/2003    Shingles Vaccine (1 of 2) Never done    Breast cancer screen  06/25/2018    Colon cancer screen colonoscopy  05/30/2019    Potassium monitoring  03/30/2020    Creatinine monitoring  10/11/2020    Lipid screen  03/30/2024    Flu vaccine  Completed    Hepatitis A vaccine  Aged Out    Hepatitis B vaccine  Aged Out    Hib vaccine  Aged Out    Meningococcal (ACWY) vaccine  Aged Out    Pneumococcal 0-64 years Vaccine  Aged Out             (applicable per patient's age: Cancer Screenings, Depression Screening, Fall Risk Screening, Immunizations)    Hemoglobin A1C (%)   Date Value   03/30/2019 5.3     LDL Cholesterol (mg/dL)   Date Value   03/18/2015 129     LDL Calculated (mg/dL)   Date Value   03/30/2019 105     AST (U/L)   Date Value   11/07/2020 18     ALT (U/L)   Date Value   11/07/2020 14     BUN (mg/dL)   Date Value   10/11/2019 15      (goal A1C is < 7)   (goal LDL is <100) need 30-50% reduction from baseline     BP Readings from Last 3 Encounters:   06/04/20 133/84   09/27/19 133/82   03/25/19 136/86    (goal /80)      All Future Testing planned in [facility]:  Lab Frequency Next Occurrence       Next Visit Date:  Future Appointments   Date Time Provider Nabila Moeller   4/2/2021 11:20 AM SJ Quiles - CNP [facility]   6/8/2021 10:40 AM Daniel Bynum MD Neuro Spec [facility]            Patient Active Problem List:     Carpal tunnel syndrome     Multiple sclerosis ([address]     Hepatitis     Osteopenia     Dyslipidemia     Anxiety     Crohn disease ([facility] [facility]     Benign essential HTN

## 2021-03-03 RX ORDER — PRIMIDONE 50 MG/1
100 TABLET ORAL 2 TIMES DAILY
Qty: 40 TABLET | Refills: 0 | Status: SHIPPED | OUTPATIENT
Start: 2021-03-03 | End: 2021-03-24

## 2021-03-24 ENCOUNTER — OFFICE VISIT (OUTPATIENT)
Dept: NEUROLOGY | Age: 66
End: 2021-03-24
Payer: COMMERCIAL

## 2021-03-24 VITALS
BODY MASS INDEX: 42.48 KG/M2 | DIASTOLIC BLOOD PRESSURE: 76 MMHG | HEIGHT: 61 IN | WEIGHT: 225 LBS | TEMPERATURE: 98.6 F | SYSTOLIC BLOOD PRESSURE: 126 MMHG | HEART RATE: 93 BPM

## 2021-03-24 DIAGNOSIS — R26.81 GAIT INSTABILITY: ICD-10-CM

## 2021-03-24 DIAGNOSIS — G35 MULTIPLE SCLEROSIS (HCC): Primary | ICD-10-CM

## 2021-03-24 DIAGNOSIS — R25.1 TREMOR: ICD-10-CM

## 2021-03-24 PROCEDURE — 99214 OFFICE O/P EST MOD 30 MIN: CPT | Performed by: PSYCHIATRY & NEUROLOGY

## 2021-03-24 RX ORDER — LEVETIRACETAM 500 MG/1
500 TABLET ORAL 2 TIMES DAILY
Qty: 60 TABLET | Refills: 3 | Status: SHIPPED
Start: 2021-03-24 | End: 2021-04-28 | Stop reason: CLARIF

## 2021-03-24 ASSESSMENT — ENCOUNTER SYMPTOMS
RESPIRATORY NEGATIVE: 1
EYES NEGATIVE: 1
ALLERGIC/IMMUNOLOGIC NEGATIVE: 1
GASTROINTESTINAL NEGATIVE: 1

## 2021-03-24 NOTE — PROGRESS NOTES
Subjective:      Patient ID: Chantel Garcia is a 63 y.o. female. HPI  Active Problem remitting relapsing multiple sclerosis with imbalance and ataxia on tecfidera having been evaluated at [facility] with rubral temor readjusting mysoline . There is elevated anticardiolipin Ab on aspirin. The condition is she is having more disruptive tremor of her hands left more than right which she feels maybe from lowering mysoline dosage to 100 mg po bid previously taking 100-150 . She reports that there maybe more unsteadiness falling twice over the past 2 months continuing use of rolling walker . Total lymphocyte counf 0.9(1-3.5) from November 20120  . She reports anything she carries with lef arm will lead to tremor in that arm not on right arm . There is ongoing imbalance on her feet using walker. She is using tecfidera tolerating this well . There will be left leg giveway after being on her feet over 20 minutes . There is no visual or bulbar complaints . There is some occasional urinary urgency with no incontinence . Significant medications tecfidera 240 mg po bid ,  aspirin 81 mg po qd, mysoline 100 mg po bid , vitamin D 2000 units qd . Previously on avonex. Testing MRI of Head with few foci periventricular white matter supratentorially. The largest is 1.6 cm parietal periventricular area. There is no enhancement . There is also 1 cm right parietal extra axial meningioma, February 2011. MRI of cervical spine with small patchy areas within the cord from C3-4 through C4-5 with no enhancement. There is bulging at C4-5 along with C5-6. CSF analysis with elevated IgG with oligoclonal bands. MORIAH 1:140, anticardiolipin IgM 66 . Primidone level 7.5 , January 2016 . Folate 10 , B12 263. MRI of cervical spine with multifocal cord signal abnormalities with no enhancement ilateral periventricular and deep white matter T2  hyperintensities , June 2017 . Mysoline level 9. 4(5-12) , October 2019 MRI of Head with bilateral periventricular corona radiata  T2 FLAIR signals within supratentorial compartment with no enhancement , 2019      Past Medical History:   Diagnosis Date    [facility] [address]     Diverticulosis     Hepatitis     Hypertension     Multiple sclerosis ([facility] [facility]     Osteopenia     Psoriasis        Past Surgical History:   Procedure Laterality Date    APPENDECTOMY       SECTION      CHOLECYSTECTOMY      COLONOSCOPY  2013, 2015    CYST REMOVAL      FOOT SURGERY  2012    right foot bunionectomy    TONSILLECTOMY AND ADENOIDECTOMY      WRIST FRACTURE SURGERY         Family History   Problem Relation Age of Onset    Cirrhosis Mother     Heart Disease Father        Social History     Socioeconomic History    Marital status:      Spouse name: None    Number of children: None    Years of education: None    Highest education level: None   Occupational History    None   Social Needs    Financial resource strain: None    Food insecurity     Worry: None     Inability: None    Transportation needs     Medical: None     Non-medical: None   Tobacco Use    Smoking status: Never Smoker    Smokeless tobacco: Never Used   Substance and Sexual Activity    Alcohol use: No     Alcohol/week: 0.0 standard drinks    Drug use: No    Sexual activity: None   Lifestyle    Physical activity     Days per week: None     Minutes per session: None    Stress: None   Relationships    Social connections     Talks on phone: None     Gets together: None     Attends Latter day service: None     Active member of club or organization: None     Attends meetings of clubs or organizations: None     Relationship status: None    Intimate partner violence     Fear of current or ex partner: None     Emotionally abused: None     Physically abused: None     Forced sexual activity: None   Other Topics Concern    None   Social History Narrative    None       Current Outpatient Temp: 98.6 °F (37 °C)       Neurological Examination  Constitutional .General exam well groomed   Ears /Nose/Throat: external ear . Normal exam  Neck and thyroid . Normal size  Respiratory . Breathsounds clear bilaterally  Cardiovascular: Auscultation of heart with regular rate and rhythm and normal heart sounds  Musculoskeletal. Muscle bulk and tone normal                                                               Muscle strength mild giveway all limbs left more than right                                                                               No dysmetria or dysdiadokinesis  Rubral tremor left arm   Normal fine motor  Gait ataxia  Orientation Alert and oriented x 3   Language process and speech normal . No aphasia   Cranial nerve 2 normal acuety and visual fields  Cranial nerve 3, 4 and 6 . Extraocular muscles are intact . Pupils are equal and reactive   Cranial nerve 5 . Normal strength of masseter and temporalis . Intact corneal reflex. Normal facial sensation  Cranial nerve 7 normal exam   Cranial nerve 8. Grossly intact hearing   Cranial nerve 9 and 10. Symmetric palate elevation   Cranial nerve 11 , 5 out of 5 strength   Cranial Nerve 12 midline tongue . No atrophy  Sensation . Normal proprioception . Intact Vibration . Normal pinprick and light touch   Deep Tendon Reflexes normal  Plantar response equivocal bilaterally      Assessment:       1. Multiple sclerosis    2. Tremor    3.  Gait instability    Will readjust mysoline to 100 mg po qAM 150 mg po qhs and add also for tremor keppra 500 mg po bid as suggested by Dr Sunil Benitez at [facility] along with having her undergo PT for gait retraining       Orders Placed This Encounter   Procedures    ALT     Standing Status:   Future     Standing Expiration Date:   3/24/2022    AST     Standing Status:   Future     Standing Expiration Date:   3/24/2022    Primidone level     Standing Status:   Future     Standing Expiration Date:   3/24/2022    CBC With Auto Differential     Standing Status:   Future     Standing Expiration Date:   3/24/2022    PT eval and treat     Standing Status:   Future     Standing Expiration Date:   3/24/2022     Scheduling Instructions:      Sunny NATARAJAN .  Gait retraining assess and treat

## 2021-04-02 ENCOUNTER — HOSPITAL ENCOUNTER (OUTPATIENT)
Age: 66
Setting detail: SPECIMEN
Discharge: HOME | End: 2021-04-02
Payer: COMMERCIAL

## 2021-04-02 ENCOUNTER — OFFICE VISIT (OUTPATIENT)
Dept: PRIMARY CARE CLINIC | Age: 66
End: 2021-04-02
Payer: COMMERCIAL

## 2021-04-02 VITALS
SYSTOLIC BLOOD PRESSURE: 124 MMHG | OXYGEN SATURATION: 98 % | DIASTOLIC BLOOD PRESSURE: 72 MMHG | WEIGHT: 223.8 LBS | BODY MASS INDEX: 42.29 KG/M2 | HEART RATE: 81 BPM | RESPIRATION RATE: 18 BRPM

## 2021-04-02 DIAGNOSIS — R13.10 DYSPHAGIA, UNSPECIFIED TYPE: ICD-10-CM

## 2021-04-02 DIAGNOSIS — E55.9 VITAMIN D DEFICIENCY: ICD-10-CM

## 2021-04-02 DIAGNOSIS — G35 MULTIPLE SCLEROSIS (HCC): ICD-10-CM

## 2021-04-02 DIAGNOSIS — Z12.31 ENCOUNTER FOR SCREENING MAMMOGRAM FOR MALIGNANT NEOPLASM OF BREAST: ICD-10-CM

## 2021-04-02 DIAGNOSIS — Z00.00 ANNUAL PHYSICAL EXAM: Primary | ICD-10-CM

## 2021-04-02 DIAGNOSIS — Z13.29 SCREENING FOR THYROID DISORDER: ICD-10-CM

## 2021-04-02 DIAGNOSIS — Z11.59 ENCOUNTER FOR HEPATITIS C SCREENING TEST FOR LOW RISK PATIENT: ICD-10-CM

## 2021-04-02 DIAGNOSIS — K50.90 CROHN'S DISEASE WITHOUT COMPLICATION, UNSPECIFIED GASTROINTESTINAL TRACT LOCATION (HCC): ICD-10-CM

## 2021-04-02 DIAGNOSIS — I10 BENIGN ESSENTIAL HTN: ICD-10-CM

## 2021-04-02 DIAGNOSIS — R10.13 DYSPEPSIA: ICD-10-CM

## 2021-04-02 DIAGNOSIS — F41.9 ANXIETY: ICD-10-CM

## 2021-04-02 LAB
ABSOLUTE EOS #: 0.2 K/UL (ref 0–0.44)
ABSOLUTE IMMATURE GRANULOCYTE: 0.06 K/UL (ref 0–0.3)
ABSOLUTE LYMPH #: 1.34 K/UL (ref 1.1–3.7)
ABSOLUTE MONO #: 0.71 K/UL (ref 0.1–1.2)
ALBUMIN SERPL-MCNC: 4.2 G/DL (ref 3.5–5.2)
ALBUMIN/GLOBULIN RATIO: 1.2 (ref 1–2.5)
ALP BLD-CCNC: 66 U/L (ref 35–104)
ALT SERPL-CCNC: 12 U/L (ref 5–33)
ALT SERPL-CCNC: 12 U/L (ref 5–33)
ANION GAP SERPL CALCULATED.3IONS-SCNC: 15 MMOL/L (ref 9–17)
AST SERPL-CCNC: 15 U/L
AST SERPL-CCNC: 17 U/L
BASOPHILS # BLD: 1 % (ref 0–2)
BASOPHILS ABSOLUTE: 0.07 K/UL (ref 0–0.2)
BILIRUB SERPL-MCNC: <0.1 MG/DL (ref 0.3–1.2)
BUN BLDV-MCNC: 13 MG/DL (ref 8–23)
BUN/CREAT BLD: ABNORMAL (ref 9–20)
CALCIUM SERPL-MCNC: 9.2 MG/DL (ref 8.6–10.4)
CHLORIDE BLD-SCNC: 102 MMOL/L (ref 98–107)
CHOLESTEROL/HDL RATIO: 2.9
CHOLESTEROL: 183 MG/DL
CO2: 23 MMOL/L (ref 20–31)
CREAT SERPL-MCNC: 0.58 MG/DL (ref 0.5–0.9)
DIFFERENTIAL TYPE: ABNORMAL
EOSINOPHILS RELATIVE PERCENT: 2 % (ref 1–4)
GFR AFRICAN AMERICAN: >60 ML/MIN
GFR NON-AFRICAN AMERICAN: >60 ML/MIN
GFR SERPL CREATININE-BSD FRML MDRD: ABNORMAL ML/MIN/{1.73_M2}
GFR SERPL CREATININE-BSD FRML MDRD: ABNORMAL ML/MIN/{1.73_M2}
GLUCOSE BLD-MCNC: 76 MG/DL (ref 70–99)
HCT VFR BLD CALC: 41.9 % (ref 36.3–47.1)
HDLC SERPL-MCNC: 63 MG/DL
HEMOGLOBIN: 13.2 G/DL (ref 11.9–15.1)
HEPATITIS C ANTIBODY: NONREACTIVE
IMMATURE GRANULOCYTES: 1 %
LDL CHOLESTEROL: 98 MG/DL (ref 0–130)
LYMPHOCYTES # BLD: 16 % (ref 24–43)
MCH RBC QN AUTO: 30.3 PG (ref 25.2–33.5)
MCHC RBC AUTO-ENTMCNC: 31.5 G/DL (ref 28.4–34.8)
MCV RBC AUTO: 96.1 FL (ref 82.6–102.9)
MONOCYTES # BLD: 8 % (ref 3–12)
NRBC AUTOMATED: 0 PER 100 WBC
PDW BLD-RTO: 13.7 % (ref 11.8–14.4)
PLATELET # BLD: 397 K/UL (ref 138–453)
PLATELET ESTIMATE: ABNORMAL
PMV BLD AUTO: 10.3 FL (ref 8.1–13.5)
POTASSIUM SERPL-SCNC: 4.1 MMOL/L (ref 3.7–5.3)
RBC # BLD: 4.36 M/UL (ref 3.95–5.11)
RBC # BLD: ABNORMAL 10*6/UL
SEG NEUTROPHILS: 72 % (ref 36–65)
SEGMENTED NEUTROPHILS ABSOLUTE COUNT: 6.05 K/UL (ref 1.5–8.1)
SODIUM BLD-SCNC: 140 MMOL/L (ref 135–144)
TOTAL PROTEIN: 7.7 G/DL (ref 6.4–8.3)
TRIGL SERPL-MCNC: 108 MG/DL
TSH SERPL DL<=0.05 MIU/L-ACNC: 2.28 MIU/L (ref 0.3–5)
VITAMIN D 25-HYDROXY: 30.6 NG/ML (ref 30–100)
VLDLC SERPL CALC-MCNC: NORMAL MG/DL (ref 1–30)
WBC # BLD: 8.4 K/UL (ref 3.5–11.3)
WBC # BLD: ABNORMAL 10*3/UL

## 2021-04-02 PROCEDURE — 99396 PREV VISIT EST AGE 40-64: CPT | Performed by: NURSE PRACTITIONER

## 2021-04-02 RX ORDER — OMEPRAZOLE 20 MG/1
20 CAPSULE, DELAYED RELEASE ORAL
Qty: 30 CAPSULE | Refills: 2 | Status: SHIPPED | OUTPATIENT
Start: 2021-04-02 | End: 2021-10-20 | Stop reason: SDUPTHER

## 2021-04-02 RX ORDER — CHOLECALCIFEROL (VITAMIN D3) 50 MCG
2000 TABLET ORAL DAILY
Qty: 90 TABLET | Refills: 3 | Status: SHIPPED | OUTPATIENT
Start: 2021-04-02

## 2021-04-02 RX ORDER — AMLODIPINE BESYLATE 5 MG/1
TABLET ORAL
Qty: 90 TABLET | Refills: 3 | Status: SHIPPED | OUTPATIENT
Start: 2021-04-02 | End: 2022-06-27

## 2021-04-02 RX ORDER — ALPRAZOLAM 0.25 MG/1
TABLET ORAL
Qty: 25 TABLET | Refills: 0 | Status: SHIPPED | OUTPATIENT
Start: 2021-04-02 | End: 2022-04-04

## 2021-04-02 ASSESSMENT — ENCOUNTER SYMPTOMS
CONSTIPATION: 0
WHEEZING: 0
BLOOD IN STOOL: 0
SINUS PRESSURE: 0
DIARRHEA: 0
SORE THROAT: 0
VOMITING: 0
SHORTNESS OF BREATH: 0
ABDOMINAL PAIN: 0
COUGH: 0
BLURRED VISION: 0
TROUBLE SWALLOWING: 1
NAUSEA: 0

## 2021-04-02 ASSESSMENT — PATIENT HEALTH QUESTIONNAIRE - PHQ9
SUM OF ALL RESPONSES TO PHQ QUESTIONS 1-9: 0
SUM OF ALL RESPONSES TO PHQ QUESTIONS 1-9: 0
2. FEELING DOWN, DEPRESSED OR HOPELESS: 0
SUM OF ALL RESPONSES TO PHQ QUESTIONS 1-9: 0

## 2021-04-02 NOTE — PROGRESS NOTES
[facility] PRIMARY CARE  [facility] [address] [facility] [address]  [address] [address]  Dept: [phone]  Dept Fax: [phone]    Carlos Slaughter is a 63 y.o. female who presentstoday for her medical conditions/complaints as noted below. Carlos Slaughter is c/o of  Chief Complaint   Patient presents with    Annual Exam         HPI:     Here today with her spouse for annual exam  Has been seeing neuro on regular basis, reports her MS has been worsening  Started on keppra last week  Has been having increased difficulty swallowing over the past several months  She states she mainly notices with solids  Has also been having increased heartburn/dyspepsia  She has not tried any antiacids    Reports her Crohn's has been stable, no changes, intermittent diarrhea        Hypertension  This is a chronic problem. The current episode started more than 1 year ago. Pertinent negatives include no blurred vision, chest pain, headaches, palpitations, peripheral edema or shortness of breath. Past treatments include ACE inhibitors, diuretics and calcium channel blockers. The current treatment provides significant improvement. There are no compliance problems. There is no history of CAD/MI or CVA.        Hemoglobin A1C (%)   Date Value   2019 5.3             ( goal A1C is < 7)   No results found for: LABMICR  LDL Cholesterol (mg/dL)   Date Value   2015 129     LDL Calculated (mg/dL)   Date Value   2019 105   2016 128   03/10/2014 114       (goal LDL is <100)   AST (U/L)   Date Value   2020 18     ALT (U/L)   Date Value   2020 14     BUN (mg/dL)   Date Value   10/11/2019 15     BP Readings from Last 3 Encounters:   21 124/72   21 126/76   20 133/84          ([id number])    Past Medical History:   Diagnosis Date    [facility] [address]     Diverticulosis     Hepatitis     Hypertension     Multiple sclerosis ([facility] [address]     Osteopenia     Psoriasis       Past Surgical History:   Procedure Laterality Date    APPENDECTOMY       SECTION      CHOLECYSTECTOMY      COLONOSCOPY  2013, 2015    CYST REMOVAL      FOOT SURGERY  2012    right foot bunionectomy    TONSILLECTOMY AND ADENOIDECTOMY      WRIST FRACTURE SURGERY         Family History   Problem Relation Age of Onset    Cirrhosis Mother     Heart Disease Father           Social History     Tobacco Use    Smoking status: Never Smoker    Smokeless tobacco: Never Used   Substance Use Topics    Alcohol use: No     Alcohol/week: 0.0 standard drinks      Current Outpatient Medications   Medication Sig Dispense Refill    ALPRAZolam (XANAX) 0.25 MG tablet TAKE ONE TABLET BY MOUTH DAILY AS NEEDED FOR ANXIETY 25 tablet 0    amLODIPine (NORVASC) 5 MG tablet TAKE 1 TABLET DAILY (NEED APPOINTMENT BEFORE FURTHER REFILLS) 90 tablet 3    vitamin D (CHOLECALCIFEROL) 50 MCG (2000 UT) TABS tablet Take 1 tablet by mouth daily 90 tablet 3    omeprazole (PRILOSEC) 20 MG delayed release capsule Take 1 capsule by mouth every morning (before breakfast) 30 capsule 2    levETIRAcetam (KEPPRA) 500 MG tablet Take 1 tablet by mouth 2 times daily 60 tablet 3    PARoxetine (PAXIL CR) 25 MG extended release tablet TAKE 1 TABLET EVERY MORNING 90 tablet 3    lisinopril-hydroCHLOROthiazide (PRINZIDE;ZESTORETIC) 20-25 MG per tablet TAKE 1 TABLET DAILY 90 tablet 3    KLOR-CON M20 20 MEQ extended release tablet TAKE 1 TABLET DAILY 90 tablet 0    primidone (MYSOLINE) 50 MG tablet Take 2 po bid 360 tablet 3    dimethyl fumarate (TECFIDERA) 240 MG delayed release capsule Take 1 capsule by mouth 2 times daily.  180 capsule 3    alendronate (FOSAMAX) 70 MG tablet TAKE 1 TABLET EVERY 7 DAYS 12 tablet 4    Alpha-Lipoic Acid 600 MG CAPS Take by mouth      cyanocobalamin (CVS VITAMIN B12) 1000 MCG tablet Take 1 tablet by mouth daily 90 tablet 3    LIALDA 1.2 G EC tablet Take 2 tablets a day      aspirin 81 MG EC tablet Take 81 mg by mouth daily. No current facility-administered medications for this visit. No Known Allergies    Health Maintenance   Topic Date Due    Hepatitis C screen  Never done    DTaP/Tdap/Td vaccine (1 - Tdap) Never done    Cervical cancer screen  01/01/2003    Shingles Vaccine (1 of 2) Never done    Breast cancer screen  06/25/2018    Colon cancer screen colonoscopy  05/30/2019    Potassium monitoring  03/30/2020    Creatinine monitoring  10/11/2020    Lipid screen  03/30/2024    Flu vaccine  Completed    COVID-19 Vaccine  Completed    Hepatitis A vaccine  Aged Out    Hepatitis B vaccine  Aged Out    Hib vaccine  Aged Out    Meningococcal (ACWY) vaccine  Aged Out    Pneumococcal 0-64 years Vaccine  Aged Out    HIV screen  Discontinued       Subjective:      Review of Systems   Constitutional: Negative for activity change, appetite change, chills, fatigue, fever and unexpected weight change. HENT: Positive for trouble swallowing. Negative for congestion, ear pain, hearing loss, sinus pressure and sore throat. Eyes: Negative for blurred vision and visual disturbance. Respiratory: Negative for cough, shortness of breath and wheezing. Cardiovascular: Negative for chest pain, palpitations and leg swelling. Gastrointestinal: Negative for abdominal pain, blood in stool, constipation, diarrhea, nausea and vomiting. Endocrine: Negative for cold intolerance, heat intolerance, polydipsia, polyphagia and polyuria. Genitourinary: Negative for difficulty urinating, frequency, hematuria and urgency. Musculoskeletal: Positive for gait problem. Negative for arthralgias and myalgias. Skin: Negative for rash. Allergic/Immunologic: Negative for environmental allergies. Neurological: Positive for tremors and weakness. Negative for dizziness, light-headedness and headaches. Due to MS   Psychiatric/Behavioral: Negative for confusion.  The patient is OR WO CAD BILATERAL   11. Encounter for hepatitis C screening test for low risk patient  Hepatitis C Antibody             Plan:      Return in about 6 months (around 10/2/2021) for hypertension check.     Orders Placed This Encounter   Procedures    DANIA DIGITAL SCREEN W OR WO CAD BILATERAL     Standing Status:   Future     Standing Expiration Date:   6/2/2022    Comprehensive Metabolic Panel     Standing Status:   Future     Number of Occurrences:   1     Standing Expiration Date:   4/2/2022    Lipid Panel     Standing Status:   Future     Number of Occurrences:   1     Standing Expiration Date:   4/2/2022     Order Specific Question:   Is Patient Fasting?/# of Hours     Answer:   8    TSH without Reflex     Standing Status:   Future     Number of Occurrences:   1     Standing Expiration Date:   4/2/2022    Vitamin D 25 Hydroxy     Standing Status:   Future     Number of Occurrences:   1     Standing Expiration Date:   4/2/2022    Hepatitis C Antibody     Standing Status:   Future     Number of Occurrences:   1     Standing Expiration Date:   4/2/2022    DICK Maurice MD, Gastroenterology, [place]     Referral Priority:   Routine     Referral Type:   Eval and Treat     Referral Reason:   Specialty Services Required     Referred to Provider:   Varghese Hutchins MD     Requested Specialty:   Gastroenterology     Number of Visits Requested:   1        Orders Placed This Encounter   Medications    ALPRAZolam (XANAX) 0.25 MG tablet     Sig: TAKE ONE TABLET BY MOUTH DAILY AS NEEDED FOR ANXIETY     Dispense:  25 tablet     Refill:  0    amLODIPine (NORVASC) 5 MG tablet     Sig: TAKE 1 TABLET DAILY (NEED APPOINTMENT BEFORE FURTHER REFILLS)     Dispense:  90 tablet     Refill:  3    vitamin D (CHOLECALCIFEROL) 50 MCG (2000 UT) TABS tablet     Sig: Take 1 tablet by mouth daily     Dispense:  90 tablet     Refill:  3    omeprazole (PRILOSEC) 20 MG delayed release capsule     Sig: Take 1 capsule by mouth every

## 2021-04-03 LAB
PHENOBARBITAL: 9.6 UG/ML (ref 15–40)
PRIMIDONE LEVEL: 10.1 UG/ML (ref 5–12)

## 2021-04-28 ENCOUNTER — TELEMEDICINE (OUTPATIENT)
Dept: NEUROLOGY | Age: 66
End: 2021-04-28
Payer: COMMERCIAL

## 2021-04-28 DIAGNOSIS — R25.1 TREMOR: Primary | ICD-10-CM

## 2021-04-28 DIAGNOSIS — R26.81 GAIT INSTABILITY: ICD-10-CM

## 2021-04-28 DIAGNOSIS — G35 MULTIPLE SCLEROSIS (HCC): ICD-10-CM

## 2021-04-28 PROCEDURE — 99214 OFFICE O/P EST MOD 30 MIN: CPT | Performed by: PSYCHIATRY & NEUROLOGY

## 2021-04-28 RX ORDER — LEVETIRACETAM 1000 MG/1
1000 TABLET ORAL 2 TIMES DAILY
Qty: 60 TABLET | Refills: 2 | Status: SHIPPED | OUTPATIENT
Start: 2021-04-28 | End: 2021-09-08 | Stop reason: SDUPTHER

## 2021-04-28 RX ORDER — PRIMIDONE 50 MG/1
TABLET ORAL
Qty: 450 TABLET | Refills: 3 | Status: SHIPPED | OUTPATIENT
Start: 2021-04-28 | End: 2022-06-06

## 2021-04-28 RX ORDER — DIMETHYL FUMARATE 240 MG/1
CAPSULE ORAL
Qty: 180 CAPSULE | Refills: 3 | Status: SHIPPED | OUTPATIENT
Start: 2021-04-28 | End: 2022-06-06

## 2021-04-28 ASSESSMENT — ENCOUNTER SYMPTOMS
GASTROINTESTINAL NEGATIVE: 1
RESPIRATORY NEGATIVE: 1
ALLERGIC/IMMUNOLOGIC NEGATIVE: 1
EYES NEGATIVE: 1

## 2021-04-28 NOTE — PROGRESS NOTES
Subjective:      Patient ID: Corby Tierney is a 63 y.o. female. HPI  Active Problem remitting relapsing multiple sclerosis with imbalance and ataxia on tecfidera placed on keppra for rubral temor on cojoint mysoline as suggested by Dr Martín Gabriel at [facility] . There is elevated anticardiolipin Ab on aspirin. The condition is she reports that keppra addition to mysoline tremor has improved tremor by about 50 % wearing off after 3 hours . She remains on mysoline 100 mg po qAN M , 150 mg po qhs tolerating medication well  . Tremor in her hands left more than right  . There is unsteadiness of gait using rolling walker . Total lymphocyte counf 1.34 (1-3.5)  . She reports anything she carries with lef arm will lead to tremor in that arm not on right arm . She is using tecfidera tolerating this well . There will be left leg giveway after being on her feet over 20 minutes . There is no visual or bulbar complaints . There is some occasional urinary urgency with no incontinence . Significant medications tecfidera 240 mg po bid ,  aspirin 81 mg po qd, mysoline 100 mg po qAM 150 mg po qhs  , vitamin D 2000 units qd., keppra 500 mg po bid  . Previously on avonex. Testing MRI of Head with few foci periventricular white matter supratentorially. The largest is 1.6 cm parietal periventricular area. There is no enhancement . There is also 1 cm right parietal extra axial meningioma, February 2011. MRI of cervical spine with small patchy areas within the cord from C3-4 through C4-5 with no enhancement. There is bulging at C4-5 along with C5-6. CSF analysis with elevated IgG with oligoclonal bands. MORIAH 1:140, anticardiolipin IgM 66 . Primidone level 7.5 , January 2016 . Folate 10 , B12 263. MRI of cervical spine with multifocal cord signal abnormalities with no enhancement ilateral periventricular and deep white matter T2  hyperintensities , June 2017 . Mysoline level 9. 6(5-12) .  MRI of Head with bilateral periventricular corona radiata  T2 FLAIR signals within supratentorial compartment with no enhancement , 2019      Past Medical History:   Diagnosis Date    [facility] ([facility] [address]     Diverticulosis     Hepatitis     Hypertension     Multiple sclerosis ([facility] [address]     Osteopenia     Psoriasis        Past Surgical History:   Procedure Laterality Date    APPENDECTOMY       SECTION      CHOLECYSTECTOMY      COLONOSCOPY  2013, 2015    CYST REMOVAL      FOOT SURGERY  2012    right foot bunionectomy    TONSILLECTOMY AND ADENOIDECTOMY      WRIST FRACTURE SURGERY         Family History   Problem Relation Age of Onset    Cirrhosis Mother     Heart Disease Father        Social History     Socioeconomic History    Marital status:      Spouse name: Not on file    Number of children: Not on file    Years of education: Not on file    Highest education level: Not on file   Occupational History    Not on file   Social Needs    Financial resource strain: Not on file    Food insecurity     Worry: Not on file     Inability: Not on file    Transportation needs     Medical: Not on file     Non-medical: Not on file   Tobacco Use    Smoking status: Never Smoker    Smokeless tobacco: Never Used   Substance and Sexual Activity    Alcohol use: No     Alcohol/week: 0.0 standard drinks    Drug use: No    Sexual activity: Not on file   Lifestyle    Physical activity     Days per week: Not on file     Minutes per session: Not on file    Stress: Not on file   Relationships    Social connections     Talks on phone: Not on file     Gets together: Not on file     Attends Baptism service: Not on file     Active member of club or organization: Not on file     Attends meetings of clubs or organizations: Not on file     Relationship status: Not on file    Intimate partner violence     Fear of current or ex partner: Not on file     Emotionally abused: Not on file     Physically abused: Not on file Genitourinary: Positive for urgency. Musculoskeletal: Positive for gait problem. Skin: Negative. Allergic/Immunologic: Negative. Neurological: Positive for tremors and weakness. Hematological: Negative. Psychiatric/Behavioral: The patient is nervous/anxious. Objective:   Physical Exam  There were no vitals filed for this visit. Neurological Examination  Constitutional .General exam well groomed   Ears /Nose/Throat: external ear . Normal exam  Neck and thyroid . Normal size  Respiratory . Breathsounds clear bilaterally  Cardiovascular: Auscultation of heart with regular rate and rhythm and normal heart sounds  Musculoskeletal. Muscle bulk and tone normal                                                               Muscle strength mild giveway all limbs left more than right                                                                               No dysmetria or dysdiadokinesis  Rubral tremor left arm   Normal fine motor  Gait ataxia  Orientation Alert and oriented x 3   Language process and speech normal . No aphasia   Cranial nerve 2 normal acuety and visual fields  Cranial nerve 3, 4 and 6 . Extraocular muscles are intact . Pupils are equal and reactive   Cranial nerve 5 . Normal strength of masseter and temporalis . Intact corneal reflex. Normal facial sensation  Cranial nerve 7 normal exam   Cranial nerve 8. Grossly intact hearing   Cranial nerve 9 and 10. Symmetric palate elevation   Cranial nerve 11 , 5 out of 5 strength   Cranial Nerve 12 midline tongue . No atrophy  Sensation . Normal proprioception . Intact Vibration . Normal pinprick and light touch   Deep Tendon Reflexes normal  Plantar response equivocal bilaterally      Assessment:       1. Tremor    2. Multiple sclerosis    3.  Gait instability    Will have patient increase keppra to 1000 mg po bid staying on current mysoline regimen       As above    Ignacio Brothers, was evaluated through a synchronous (real-time) audio-video encounter. The patient (or guardian if applicable) is aware that this is a billable service. Verbal consent to proceed has been obtained within the past 12 months. The visit was conducted pursuant to the emergency declaration under the [address] [place] [address], [address] [place] [address] authority and the [employer] General Act. Patient identification was verified, and a caregiver was present when appropriate. The patient was located in a state where the provider was credentialed to provide care. Total time spent for this encounter: 20 minutes     --Ariella Simms MD on 4/29/2021 at 8:54 AM    An electronic signature was used to authenticate this note.

## 2021-05-11 RX ORDER — POTASSIUM CHLORIDE 1500 MG/1
TABLET, EXTENDED RELEASE ORAL
Qty: 90 TABLET | Refills: 3 | Status: SHIPPED | OUTPATIENT
Start: 2021-05-11 | End: 2022-05-23

## 2021-05-14 ENCOUNTER — OFFICE VISIT (OUTPATIENT)
Dept: PRIMARY CARE CLINIC | Age: 66
End: 2021-05-14
Payer: COMMERCIAL

## 2021-05-14 VITALS
OXYGEN SATURATION: 98 % | HEART RATE: 64 BPM | WEIGHT: 223 LBS | DIASTOLIC BLOOD PRESSURE: 72 MMHG | BODY MASS INDEX: 42.14 KG/M2 | RESPIRATION RATE: 16 BRPM | SYSTOLIC BLOOD PRESSURE: 126 MMHG

## 2021-05-14 DIAGNOSIS — Z48.02 VISIT FOR SUTURE REMOVAL: Primary | ICD-10-CM

## 2021-05-14 DIAGNOSIS — S01.01XA LACERATION OF SCALP, INITIAL ENCOUNTER: ICD-10-CM

## 2021-05-14 PROCEDURE — 99213 OFFICE O/P EST LOW 20 MIN: CPT | Performed by: NURSE PRACTITIONER

## 2021-05-14 ASSESSMENT — ENCOUNTER SYMPTOMS
TROUBLE SWALLOWING: 0
VOMITING: 0
BLOOD IN STOOL: 0
SHORTNESS OF BREATH: 0
NAUSEA: 0
ABDOMINAL PAIN: 0
WHEEZING: 0
DIARRHEA: 0
SINUS PRESSURE: 0
SORE THROAT: 0
CONSTIPATION: 0
COUGH: 0

## 2021-05-14 NOTE — PROGRESS NOTES
Warm enough to get outside and stones throw? Could you [address] could you [address] or what about the [address]ruit [address]? What about the [address]? I really do not care really do not care was is trying to think of something other than rubs but we can do that if after 2 days [address] [address] [address] [facility] [address]  [address] [address]  Dept: [phone]  Dept Fax: [phone]    Rosalio Mendosa is a 63 y.o. female who presentstoday for her medical conditions/complaints as noted below. Rosalio Mendosa is c/o of  Chief Complaint   Patient presents with    Suture / Staple Removal       HPI:     Here today for suture removal from scalp  6 days ago was out at restaurant while in [place] was hit by door and lacerated scalp  Went to ED there and 3 staples were placed  Denies any pain or drainage from the area    Suture / Staple Removal  The sutures were placed 3 to 6 days ago. She tried regular soap and water washings since the wound repair. The treatment provided significant relief. There has been no drainage from the wound. There is no redness present. The pain has improved.        Hemoglobin A1C (%)   Date Value   2019 5.3             ( goal A1C is < 7)   No results found for: LABMICR  LDL Cholesterol (mg/dL)   Date Value   2021 98   2015 129     LDL Calculated (mg/dL)   Date Value   2019 105   2016 128   03/10/2014 114       (goal LDL is <100)   AST (U/L)   Date Value   2021 17     ALT (U/L)   Date Value   2021 12     BUN (mg/dL)   Date Value   2021 13     BP Readings from Last 3 Encounters:   21 126/72   21 124/72   21 126/76          ([id number]/80)    Past Medical History:   Diagnosis Date    [facility]'s disease ([facility] [address]     Diverticulosis     Hepatitis     Hypertension     Multiple sclerosis ([facility] [address] [facility]     Osteopenia     Psoriasis       Past Surgical History:   Procedure Laterality Date    APPENDECTOMY       SECTION      CHOLECYSTECTOMY      COLONOSCOPY  2013, 2015    CYST REMOVAL      FOOT SURGERY  2012    right foot bunionectomy    TONSILLECTOMY AND ADENOIDECTOMY      WRIST FRACTURE SURGERY         Family History   Problem Relation Age of Onset    Cirrhosis Mother     Heart Disease Father           Social History     Tobacco Use    Smoking status: Never Smoker    Smokeless tobacco: Never Used   Substance Use Topics    Alcohol use: No     Alcohol/week: 0.0 standard drinks      Current Outpatient Medications   Medication Sig Dispense Refill    KLOR-CON M20 20 MEQ extended release tablet TAKE 1 TABLET DAILY 90 tablet 3    levETIRAcetam (KEPPRA) 1000 MG tablet Take 1 tablet by mouth 2 times daily 60 tablet 2    primidone (MYSOLINE) 50 MG tablet Take 2 po qAM , 3 po qhs 450 tablet 3    dimethyl fumarate (TECFIDERA) 240 MG delayed release capsule Take 1 capsule by mouth 2 times daily. 180 capsule 3    ALPRAZolam (XANAX) 0.25 MG tablet TAKE ONE TABLET BY MOUTH DAILY AS NEEDED FOR ANXIETY 25 tablet 0    amLODIPine (NORVASC) 5 MG tablet TAKE 1 TABLET DAILY (NEED APPOINTMENT BEFORE FURTHER REFILLS) 90 tablet 3    vitamin D (CHOLECALCIFEROL) 50 MCG (2000 UT) TABS tablet Take 1 tablet by mouth daily 90 tablet 3    omeprazole (PRILOSEC) 20 MG delayed release capsule Take 1 capsule by mouth every morning (before breakfast) 30 capsule 2    PARoxetine (PAXIL CR) 25 MG extended release tablet TAKE 1 TABLET EVERY MORNING 90 tablet 3    lisinopril-hydroCHLOROthiazide (PRINZIDE;ZESTORETIC) 20-25 MG per tablet TAKE 1 TABLET DAILY 90 tablet 3    dimethyl fumarate (TECFIDERA) 240 MG delayed release capsule Take 1 capsule by mouth 2 times daily.  180 capsule 3    alendronate (FOSAMAX) 70 MG tablet TAKE 1 TABLET EVERY 7 DAYS 12 tablet 4    Alpha-Lipoic Acid 600 MG CAPS Take by mouth      cyanocobalamin (CVS VITAMIN B12) 1000 MCG tablet Take 1 tablet by mouth daily 90 tablet 3    LIALDA 1.2 G EC tablet Take 2 tablets a day      aspirin 81 MG EC tablet Take 81 mg by mouth daily. No current facility-administered medications for this visit. No Known Allergies    Health Maintenance   Topic Date Due    DTaP/Tdap/Td vaccine (1 - Tdap) Never done    Cervical cancer screen  01/01/2003    Shingles Vaccine (1 of 2) Never done    Breast cancer screen  06/25/2018    Colon cancer screen colonoscopy  05/30/2019    Potassium monitoring  04/02/2022    Creatinine monitoring  04/02/2022    Lipid screen  04/02/2026    Flu vaccine  Completed    COVID-19 Vaccine  Completed    Hepatitis C screen  Completed    Hepatitis A vaccine  Aged Out    Hepatitis B vaccine  Aged Out    Hib vaccine  Aged Out    Meningococcal (ACWY) vaccine  Aged Out    Pneumococcal 0-64 years Vaccine  Aged Out    HIV screen  Discontinued       Subjective:      Review of Systems   Constitutional: Negative for activity change, appetite change, chills, fatigue, fever and unexpected weight change. HENT: Negative for congestion, ear pain, hearing loss, sinus pressure, sore throat and trouble swallowing. Eyes: Negative for visual disturbance. Respiratory: Negative for cough, shortness of breath and wheezing. Cardiovascular: Negative for chest pain, palpitations and leg swelling. Gastrointestinal: Negative for abdominal pain, blood in stool, constipation, diarrhea, nausea and vomiting. Endocrine: Negative for cold intolerance, heat intolerance, polydipsia, polyphagia and polyuria. Genitourinary: Negative for difficulty urinating, frequency, hematuria and urgency. Musculoskeletal: Positive for gait problem Carrillo Pepe). Negative for arthralgias and myalgias. Skin: Negative for rash. Allergic/Immunologic: Negative for environmental allergies. Neurological: Positive for tremors. Negative for dizziness, weakness, light-headedness and headaches.    Psychiatric/Behavioral: Negative for confusion. The patient is not nervous/anxious. Objective:     Physical Exam  Constitutional:       Appearance: She is well-developed. HENT:      Head: Normocephalic. Eyes:      Conjunctiva/sclera: Conjunctivae normal.      Pupils: Pupils are equal, round, and reactive to light. Neck:      Musculoskeletal: Normal range of motion. Cardiovascular:      Rate and Rhythm: Normal rate and regular rhythm. Heart sounds: Normal heart sounds. No murmur. Pulmonary:      Effort: Pulmonary effort is normal.      Breath sounds: Normal breath sounds. No wheezing. Abdominal:      General: Bowel sounds are normal. There is no distension. Palpations: Abdomen is soft. Musculoskeletal: Normal range of motion. Skin:     General: Skin is warm and dry. Comments: 3 staples intact to right front of scalp near hairline, laceration is well approximated with no drainage or redness   Neurological:      Mental Status: She is alert and oriented to person, place, and time. Psychiatric:         Behavior: Behavior normal.         Thought Content: Thought content normal.         Judgment: Judgment normal.       /72   Pulse 64   Resp 16   Wt 223 lb (101.2 kg)   SpO2 98%   BMI 42.14 kg/m²     Assessment:       Diagnosis Orders   1. Visit for suture removal     2. Laceration of scalp, initial encounter               Plan:      Return in about 5 months (around 10/4/2021). Suture removal from scalp lacerationarea is well approximated, scant bloody drainage from 1 staple site, 3 staples easily removed       Patient given educational materials - see patient instructions. Discussed use, benefit, and side effects of prescribed medications. All patientquestions answered. Pt voiced understanding. Reviewed health maintenance. Instructedto continue current medications, diet and exercise. Patient agreed with treatmentplan. Follow up as directed.      Electronicallysigned by SJ Gerardo CNP on 5/14/2021 at 5:25 PM

## 2021-07-07 RX ORDER — DIMETHYL FUMARATE 240 MG/1
CAPSULE ORAL
Qty: 180 CAPSULE | Refills: 3 | Status: SHIPPED | OUTPATIENT
Start: 2021-07-07 | End: 2022-06-06 | Stop reason: SDUPTHER

## 2021-07-07 NOTE — TELEPHONE ENCOUNTER
Pharmacy requesting a  refill of Tecfidera 240mg .       Medication active on med list yes      Date of last prescription 04/28/2021  with 3 refills verified on 07/07/2021    verified by JU MCNEILL      Date of last appointment 04/28/2021    Next Visit Date:  7/27/2021

## 2021-07-15 ENCOUNTER — TELEPHONE (OUTPATIENT)
Dept: NEUROLOGY | Age: 66
End: 2021-07-15

## 2021-07-15 NOTE — TELEPHONE ENCOUNTER
Patient's  Tal mccracken called the office this morning. He stated that [facility] had contacted them regarding Kaushal Ramirez.  wasn't completely sure what needed to be done. I told him I would contact [facility] to look in to this and let him know. Tal mccracken did state that Oumar Clarke broke her ankle two weeks ago and was currently in a rehab. I placed a call to [facility] and spoke with pharmacist Jake Kelley. She stated that since the patient last received her 90 day supply (4/30/21) Tecfidera went generic. If the patient wants to switch to the generic it would be a $0 copay. Jake Kelley did also run a claim for the brand name medication which came back needing a prior Hadley Grater. Jake Kelley did say that the generic Rx was ready to go, they just needed to set up shipping with the patient. I told her I would check with the patient and call back if they wanted to proceed with the brand name. I placed a call back to patient's  Tal mccracken and a message left on his VM asking for a return call. Tal mccracken called the office back and this information was given.  stated that he would check with Ana day and let me know. I did explain that it could possibly take a while for the prior authorization. If their insurance approved the brand name it may also be a very high copay.  verbally stated his understanding.

## 2021-08-02 ENCOUNTER — TELEPHONE (OUTPATIENT)
Dept: PRIMARY CARE CLINIC | Age: 66
End: 2021-08-02

## 2021-09-08 DIAGNOSIS — R25.1 TREMOR: Primary | ICD-10-CM

## 2021-09-08 RX ORDER — LEVETIRACETAM 1000 MG/1
1000 TABLET ORAL 2 TIMES DAILY
Qty: 60 TABLET | Refills: 5 | Status: SHIPPED | OUTPATIENT
Start: 2021-09-08 | End: 2021-09-10 | Stop reason: SDUPTHER

## 2021-09-08 NOTE — TELEPHONE ENCOUNTER
Patient called in requesting a refill and reported that the tremors have not improved with the new dose. She wants to make sure should remain on the higher dose. She is tolerating it well. Patient reports she broke her ankle 07/01/2021. She said she has had a decline since in ADLs since her accident.      Pharmacy requesting a  refill of: Keppra 1000mg     Medication active on med list yes     Date of last prescription: 04/28/2021  with 2  refills verified on 09/08/2021  verified by EDUARDO VILLAR     Date of last appointment: 04/28/2021     Next Visit Date:  10/13/2021

## 2021-09-10 DIAGNOSIS — R25.1 TREMOR: ICD-10-CM

## 2021-09-10 RX ORDER — LEVETIRACETAM 1000 MG/1
1000 TABLET ORAL 2 TIMES DAILY
Qty: 60 TABLET | Refills: 5 | Status: SHIPPED
Start: 2021-09-10 | End: 2021-12-30 | Stop reason: CLARIF

## 2021-09-10 NOTE — TELEPHONE ENCOUNTER
Ana's [facility]acet[facility] but only [facility] day. I called pt and she wanted it filled at [facility] locally.  Jed

## 2021-10-07 ENCOUNTER — TELEPHONE (OUTPATIENT)
Dept: NEUROLOGY | Age: 66
End: 2021-10-07

## 2021-10-07 NOTE — TELEPHONE ENCOUNTER
[facility] called in and gave a verbal approval for Tecfidera. Approval dates: 09/07/2021 - 10/07/2022.   Approval: [id number]

## 2021-10-13 ENCOUNTER — TELEMEDICINE (OUTPATIENT)
Dept: NEUROLOGY | Age: 66
End: 2021-10-13
Payer: COMMERCIAL

## 2021-10-13 DIAGNOSIS — R25.1 TREMOR: ICD-10-CM

## 2021-10-13 DIAGNOSIS — G35 MULTIPLE SCLEROSIS (HCC): Primary | ICD-10-CM

## 2021-10-13 DIAGNOSIS — R26.81 GAIT INSTABILITY: ICD-10-CM

## 2021-10-13 PROCEDURE — 99214 OFFICE O/P EST MOD 30 MIN: CPT | Performed by: PSYCHIATRY & NEUROLOGY

## 2021-10-13 RX ORDER — AMANTADINE HYDROCHLORIDE 100 MG/1
100 CAPSULE, GELATIN COATED ORAL 2 TIMES DAILY
Qty: 60 CAPSULE | Refills: 2 | Status: SHIPPED | OUTPATIENT
Start: 2021-10-13 | End: 2022-01-03 | Stop reason: SDUPTHER

## 2021-10-13 ASSESSMENT — ENCOUNTER SYMPTOMS
EYES NEGATIVE: 1
ALLERGIC/IMMUNOLOGIC NEGATIVE: 1
RESPIRATORY NEGATIVE: 1
GASTROINTESTINAL NEGATIVE: 1

## 2021-10-13 NOTE — PROGRESS NOTES
Subjective:      Patient ID: Vignesh Rose is a 63 y.o. female. HPI  Active Problem remitting relapsing multiple sclerosis with imbalance and ataxia on tecfidera readjusting keppra for rubral temor on cojoint mysoline as suggested by Dr Sarita Hernández at [facility] . There is elevated anticardiolipin Ab on aspirin. The condition is she reports that there is ongoing issue with tremor despite keppra noting on mild change at best on 1000 mg pobid and  mysoline 100 mg po qAM 150 mg po qhs reporting mild drowsiness . Tremor in her hands left more than right  . There is unsteadiness of gait using rolling walker . She had a fall in July fracturing left ankle having surgery having been in a boot now able to ambulate . Total lymphocyte count 0.8 (1-3.5) , July 2021 . She reports anything she carries with lef arm will lead to tremor in that arm not on right arm . She is using tecfidera tolerating this well . There will be left leg giveway after being on her feet over 20 minutes . There is no visual or bulbar complaints . There is urinary urgency with occasional incontinence . Significant medications tecfidera 240 mg po bid , aspirin 81 mg po qd, mysoline 100 mg po qAM 150 mg po qhs  , vitamin D 2000 units qd., keppra 1000 mg po bid  . Previously on avonex. There is urinary frequebcy and urgebcy with occasional incontinence Testing MRI of Head with few foci periventricular white matter supratentorially. The largest is 1.6 cm parietal periventricular area. There is no enhancement . There is also 1 cm right parietal extra axial meningioma, February 2011. MRI of cervical spine with small patchy areas within the cord from C3-4 through C4-5 with no enhancement. There is bulging at C4-5 along with C5-6. CSF analysis with elevated IgG with oligoclonal bands. MORIAH 1:140, anticardiolipin IgM 66 . Folate 10 , B12 263.  MRI of cervical spine with multifocal cord signal abnormalities with no enhancement ilateral periventricular and deep white matter T2  hyperintensities , 2017 . Mysoline level 9. 6(5-12) . MRI of Head with bilateral periventricular corona radiata  T2 FLAIR signals within supratentorial compartment with no enhancement , 2019 . Primidone level 9.6      Past Medical History:   Diagnosis Date    [facility] ([facility] [address] [facility]     Diverticulosis     Hepatitis     Hypertension     Multiple sclerosis ([facility] [address] [facility]     Osteopenia     Psoriasis        Past Surgical History:   Procedure Laterality Date    APPENDECTOMY       SECTION      CHOLECYSTECTOMY      COLONOSCOPY  2013, 2015    CYST REMOVAL      FOOT SURGERY  2012    right foot bunionectomy    TONSILLECTOMY AND ADENOIDECTOMY      WRIST FRACTURE SURGERY         Family History   Problem Relation Age of Onset    Cirrhosis Mother     Heart Disease Father        Social History     Socioeconomic History    Marital status:      Spouse name: None    Number of children: None    Years of education: None    Highest education level: None   Occupational History    None   Tobacco Use    Smoking status: Never Smoker    Smokeless tobacco: Never Used   Vaping Use    Vaping Use: Never used   Substance and Sexual Activity    Alcohol use: No     Alcohol/week: 0.0 standard drinks    Drug use: No    Sexual activity: None   Other Topics Concern    None   Social History Narrative    None     Social Determinants of Health     Financial Resource Strain:     Difficulty of Paying Living Expenses:    Food Insecurity:     Worried About Running Out of Food in the Last Year:     Ran Out of Food in the Last Year:    Transportation Needs:     Lack of Transportation (Medical):      Lack of Transportation (Non-Medical):    Physical Activity:     Days of Exercise per Week:     Minutes of Exercise per Session:    Stress:     Feeling of Stress :    Social Connections:     Frequency of Communication with Friends and Family:     Frequency of Social Gatherings with Friends and Family:     Attends Faith Services:     Active Member of Clubs or Organizations:     Attends Club or Organization Meetings:     Marital Status:    Intimate Partner Violence:     Fear of Current or Ex-Partner:     Emotionally Abused:     Physically Abused:     Sexually Abused:        Current Outpatient Medications   Medication Sig Dispense Refill    amantadine (SYMMETREL) 100 MG capsule Take 1 capsule by mouth 2 times daily 60 capsule 2    levETIRAcetam (KEPPRA) 1000 MG tablet Take 1 tablet by mouth 2 times daily 60 tablet 5    dimethyl fumarate (TECFIDERA) 240 MG delayed release capsule TAKE 1 CAPSULE TWICE A  capsule 3    primidone (MYSOLINE) 50 MG tablet Take 2 po qAM , 3 po qhs 450 tablet 3    dimethyl fumarate (TECFIDERA) 240 MG delayed release capsule Take 1 capsule by mouth 2 times daily. 180 capsule 3    ALPRAZolam (XANAX) 0.25 MG tablet TAKE ONE TABLET BY MOUTH DAILY AS NEEDED FOR ANXIETY 25 tablet 0    amLODIPine (NORVASC) 5 MG tablet TAKE 1 TABLET DAILY (NEED APPOINTMENT BEFORE FURTHER REFILLS) 90 tablet 3    vitamin D (CHOLECALCIFEROL) 50 MCG (2000 UT) TABS tablet Take 1 tablet by mouth daily 90 tablet 3    omeprazole (PRILOSEC) 20 MG delayed release capsule Take 1 capsule by mouth every morning (before breakfast) 30 capsule 2    PARoxetine (PAXIL CR) 25 MG extended release tablet TAKE 1 TABLET EVERY MORNING 90 tablet 3    lisinopril-hydroCHLOROthiazide (PRINZIDE;ZESTORETIC) 20-25 MG per tablet TAKE 1 TABLET DAILY 90 tablet 3    dimethyl fumarate (TECFIDERA) 240 MG delayed release capsule Take 1 capsule by mouth 2 times daily.  180 capsule 3    alendronate (FOSAMAX) 70 MG tablet TAKE 1 TABLET EVERY 7 DAYS 12 tablet 4    Alpha-Lipoic Acid 600 MG CAPS Take by mouth      cyanocobalamin (CVS VITAMIN B12) 1000 MCG tablet Take 1 tablet by mouth daily 90 tablet 3    LIALDA 1.2 G EC tablet Take 2 tablets a day      aspirin 81 MG EC tablet Take 81 mg by mouth daily.  KLOR-CON M20 20 MEQ extended release tablet TAKE 1 TABLET DAILY 90 tablet 3     No current facility-administered medications for this visit. No Known Allergies        Review of Systems   Constitutional: Negative. HENT: Negative. Eyes: Negative. Respiratory: Negative. Cardiovascular: Negative. Gastrointestinal: Negative. Endocrine: Negative. Genitourinary: Positive for urgency. Musculoskeletal: Positive for gait problem. Skin: Negative. Allergic/Immunologic: Negative. Neurological: Positive for tremors and weakness. Hematological: Negative. Psychiatric/Behavioral: The patient is nervous/anxious. Objective:   Physical Exam  There were no vitals filed for this visit. Neurological Examination  Constitutional .General exam well groomed   Ears /Nose/Throat: external ear . Normal exam  Neck and thyroid . Normal size  Respiratory . Breathsounds clear bilaterally  Cardiovascular: Auscultation of heart with regular rate and rhythm and normal heart sounds  Musculoskeletal. Muscle bulk and tone normal                                                               Muscle strength mild giveway all limbs left more than right                                                                               No dysmetria or dysdiadokinesis  Rubral tremor left arm   Normal fine motor  Gait ataxia  Orientation Alert and oriented x 3   Language process and speech normal . No aphasia   Cranial nerve 2 normal acuety and visual fields  Cranial nerve 3, 4 and 6 . Extraocular muscles are intact . Pupils are equal and reactive   Cranial nerve 5 . Normal strength of masseter and temporalis . Intact corneal reflex. Normal facial sensation  Cranial nerve 7 normal exam   Cranial nerve 8. Grossly intact hearing   Cranial nerve 9 and 10. Symmetric palate elevation   Cranial nerve 11 , 5 out of 5 strength   Cranial Nerve 12 midline tongue . No atrophy  Sensation . Normal proprioception . Intact Vibration . Normal pinprick and light touch   Deep Tendon Reflexes normal  Plantar response equivocal bilaterally      Assessment:       1. Multiple sclerosis    2. Gait instability    3. Tremor    Rubral tremor remains an issue to try amantadine 100 mg po bid per Dr Tika Burt to wean off keppra staying on mysoline . She is to undergo FU MRI of Head , tecfidera bloodwork and use walker for gait support . She would also like urology consultation       Orders Placed This Encounter   Procedures    MRI BRAIN W WO CONTRAST     Standing Status:   Future     Standing Expiration Date:   10/13/2022    ALT     Standing Status:   Future     Standing Expiration Date:   10/13/2022    AST     Standing Status:   Future     Standing Expiration Date:   10/13/2022    CBC With Auto Differential     Standing Status:   Future     Standing Expiration Date:   10/13/2022    [facility] - Omid Candelaria MD, Urology, [facility]     Referral Priority:   Routine     Referral Type:   Eval and Treat     Referral Reason:   Specialty Services Required     Referred to Provider:   Sedrick Espino MD     Requested Specialty:   Urology     Number of Visits Requested:   [address], was evaluated through a synchronous (real-time) audio-video encounter. The patient (or guardian if applicable) is aware that this is a billable service. Verbal consent to proceed has been obtained within the past 12 months. The visit was conducted pursuant to the emergency declaration under the [address] [place] [address], [address] [place] [address] authority and the [employer] Act. Patient identification was verified, and a caregiver was present when appropriate. The patient was located in a state where the provider was credentialed to provide care.     Total time spent for this encounter: 20 minutes     --Susu Coulter MD on 10/14/2021 at 8:58 AM    An electronic signature was used to authenticate this note.

## 2021-10-19 DIAGNOSIS — R10.13 DYSPEPSIA: ICD-10-CM

## 2021-10-20 RX ORDER — OMEPRAZOLE 20 MG/1
20 CAPSULE, DELAYED RELEASE ORAL
Qty: 30 CAPSULE | Refills: 2 | Status: SHIPPED | OUTPATIENT
Start: 2021-10-20 | End: 2021-10-22 | Stop reason: SDUPTHER

## 2021-10-22 ENCOUNTER — TELEPHONE (OUTPATIENT)
Dept: PRIMARY CARE CLINIC | Age: 66
End: 2021-10-22

## 2021-10-22 DIAGNOSIS — R10.13 DYSPEPSIA: ICD-10-CM

## 2021-10-22 RX ORDER — OMEPRAZOLE 20 MG/1
20 CAPSULE, DELAYED RELEASE ORAL
Qty: 90 CAPSULE | Refills: 3 | Status: SHIPPED | OUTPATIENT
Start: 2021-10-22 | End: 2021-10-29 | Stop reason: SDUPTHER

## 2021-10-22 NOTE — TELEPHONE ENCOUNTER
Pt called re: getting a [facility] day supply [facility]ep[facility]. [facility] waiting on response from you.

## 2021-10-29 RX ORDER — OMEPRAZOLE 20 MG/1
20 CAPSULE, DELAYED RELEASE ORAL
Qty: 14 CAPSULE | Refills: 0 | Status: SHIPPED | OUTPATIENT
Start: 2021-10-29 | End: 2022-10-24 | Stop reason: SDUPTHER

## 2021-10-29 RX ORDER — OMEPRAZOLE 20 MG/1
20 CAPSULE, DELAYED RELEASE ORAL
Qty: 90 CAPSULE | Refills: 3 | Status: SHIPPED | OUTPATIENT
Start: 2021-10-29 | End: 2021-10-29 | Stop reason: SDUPTHER

## 2021-10-29 NOTE — TELEPHONE ENCOUNTER
Please let her know the prescription was sent 1 week ago to [facility], does not appear to require a prior authorization. I have just now resent the prescription to [facility] and also sent a 2-week supply to her local pharmacy while she is waiting for the mail order.   Thanks

## 2021-10-29 NOTE — TELEPHONE ENCOUNTER
Patients  came into office, states that the PA for the medication omeprazole still hasnt been done, and wife needs medication. For the 90 day supply.

## 2021-11-05 ENCOUNTER — HOSPITAL ENCOUNTER (OUTPATIENT)
Dept: MRI IMAGING | Age: 66
Discharge: HOME | End: 2021-11-07
Payer: COMMERCIAL

## 2021-11-05 DIAGNOSIS — G35 MULTIPLE SCLEROSIS (HCC): ICD-10-CM

## 2021-11-05 LAB
BUN BLDV-MCNC: 16 MG/DL (ref 8–23)
CREAT SERPL-MCNC: 0.63 MG/DL (ref 0.5–0.9)
GFR AFRICAN AMERICAN: >60 ML/MIN
GFR NON-AFRICAN AMERICAN: >60 ML/MIN
GFR SERPL CREATININE-BSD FRML MDRD: NORMAL ML/MIN/{1.73_M2}
GFR SERPL CREATININE-BSD FRML MDRD: NORMAL ML/MIN/{1.73_M2}

## 2021-11-05 PROCEDURE — 70553 MRI BRAIN STEM W/O & W/DYE: CPT

## 2021-11-05 PROCEDURE — 2580000003 HC RX 258: Performed by: PSYCHIATRY & NEUROLOGY

## 2021-11-05 PROCEDURE — A9579 GAD-BASE MR CONTRAST NOS,1ML: HCPCS | Performed by: PSYCHIATRY & NEUROLOGY

## 2021-11-05 PROCEDURE — 82565 ASSAY OF CREATININE: CPT

## 2021-11-05 PROCEDURE — 36415 COLL VENOUS BLD VENIPUNCTURE: CPT

## 2021-11-05 PROCEDURE — 84520 ASSAY OF UREA NITROGEN: CPT

## 2021-11-05 PROCEDURE — 6360000004 HC RX CONTRAST MEDICATION: Performed by: PSYCHIATRY & NEUROLOGY

## 2021-11-05 RX ORDER — SODIUM CHLORIDE 0.9 % (FLUSH) 0.9 %
10 SYRINGE (ML) INJECTION ONCE
Status: COMPLETED | OUTPATIENT
Start: 2021-11-05 | End: 2021-11-05

## 2021-12-07 NOTE — TELEPHONE ENCOUNTER
Sent joe msg to pt advising of med sent to pharmacy/local pharmacy Arm band on/IV intact/Admission wristband placed

## 2021-12-30 ENCOUNTER — VIRTUAL VISIT (OUTPATIENT)
Dept: NEUROLOGY | Age: 66
End: 2021-12-30
Payer: COMMERCIAL

## 2021-12-30 DIAGNOSIS — R26.81 GAIT INSTABILITY: ICD-10-CM

## 2021-12-30 DIAGNOSIS — R25.1 TREMOR: ICD-10-CM

## 2021-12-30 DIAGNOSIS — G35 MULTIPLE SCLEROSIS (HCC): Primary | ICD-10-CM

## 2021-12-30 PROCEDURE — 99214 OFFICE O/P EST MOD 30 MIN: CPT | Performed by: PSYCHIATRY & NEUROLOGY

## 2021-12-30 RX ORDER — VIBEGRON 75 MG/1
TABLET, FILM COATED ORAL
COMMUNITY

## 2021-12-30 RX ORDER — GABAPENTIN 300 MG/1
CAPSULE ORAL
Qty: 60 CAPSULE | Refills: 2 | Status: SHIPPED | OUTPATIENT
Start: 2021-12-30 | End: 2022-03-30 | Stop reason: SDUPTHER

## 2021-12-30 NOTE — PROGRESS NOTES
Subjective:      Patient ID: Chetan Richards is a 61 y.o. female. HPI  Active Problem remitting relapsing multiple sclerosis with imbalance and ataxia on tecfidera with rubral temor to go on amantadine trial per Dr Michele Rubio to wean off keppra staying on mysoline . She is to undergo FU MRI of Head , tecfidera bloodwork and use walker for gait support . She would also like urology consultation  There is elevated anticardiolipin Ab on aspirin. The condition is she reports that there is ongoing issue with tremor on amantadine 100 mg po bid remaining on mysoline 100 mg po qAM 150 mg po qhs reporting mild drowsiness . Mysoline will be of mild improvement in that tremor is worse when she misses this medication . She has come off keppra keppra noting mild change at best on 1000 mg pobid . Tremor is in her hands left more than right. There is unsteadiness of gait using rolling walker . She had a fall in July fracturing left ankle having surgery having been in a boot now able to ambulate . Total lymphocyte count 0.8 (1-3.5) , July 2021 . She reports anything she carries with lef arm will lead to tremor in that arm not on right arm . She is using tecfidera tolerating this well . There will be left leg giveway after being on her feet over 20 minutes . There is no visual or bulbar complaints . There is urinary urgency with occasional incontinence . Significant medications tecfidera 240 mg po bid , aspirin 81 mg po qd, mysoline 100 mg po qAM 150 mg po qhs  , vitamin D 2000 units qd., keppra 1000 mg po bid  . Previously on avonex. There is urinary frequency and urgency with occasional incontinence. Testing MRI of Head with few foci periventricular white matter supratentorially. The largest is 1.6 cm parietal periventricular area. There is no enhancement . There is also 1 cm right parietal extra axial meningioma, February 2011.  MRI of cervical spine with small patchy areas within the cord from C3-4 through C4-5 with no enhancement. There is bulging at C4-5 along with C5-6. CSF analysis with elevated IgG with oligoclonal bands. MORIAH 1:140, anticardiolipin IgM 66 . Folate 10 , B12 263. MRI of cervical spine with multifocal cord signal abnormalities with no enhancement ilateral periventricular and deep white matter T2  hyperintensities , 2017 . Mysoline level 9. 6(5-12) . MRI of Head with bilateral periventricular corona radiata  T2 FLAIR signals within supratentorial compartment with no enhancement , 2019 . Primidone level 9.6 .  MRI of Head with stable subcortical white matter T2 weighted cojoint black with black holes with 9 mm right parietal convexity meningioma , 2021      Past Medical History:   Diagnosis Date    Cro[address]'s disease ([address]     Diverticulosis     Hepatitis     Hypertension     Multiple sclerosis ([facility] [address]     Osteopenia     Psoriasis        Past Surgical History:   Procedure Laterality Date    ANKLE FRACTURE SURGERY  2021    left    APPENDECTOMY       SECTION      CHOLECYSTECTOMY      COLONOSCOPY  2013, 2015    CYST REMOVAL      FOOT SURGERY  2012    right foot bunionectomy    TONSILLECTOMY AND ADENOIDECTOMY      WRIST FRACTURE SURGERY         Family History   Problem Relation Age of Onset    Cirrhosis Mother     Heart Disease Father        Social History     Socioeconomic History    Marital status:      Spouse name: None    Number of children: None    Years of education: None    Highest education level: None   Occupational History    None   Tobacco Use    Smoking status: Never Smoker    Smokeless tobacco: Never Used   Vaping Use    Vaping Use: Never used   Substance and Sexual Activity    Alcohol use: No     Alcohol/week: 0.0 standard drinks    Drug use: No    Sexual activity: None   Other Topics Concern    None   Social History Narrative    None     Social Determinants of Health     Financial Resource Strain:     Difficulty of Paying Living Expenses: Not on file   Food Insecurity:     Worried About Running Out of Food in the Last Year: Not on file    Ran Out of Food in the Last Year: Not on file   Transportation Needs:     Lack of Transportation (Medical): Not on file    Lack of Transportation (Non-Medical):  Not on file   Physical Activity:     Days of Exercise per Week: Not on file    Minutes of Exercise per Session: Not on file   Stress:     Feeling of Stress : Not on file   Social Connections:     Frequency of Communication with Friends and Family: Not on file    Frequency of Social Gatherings with Friends and Family: Not on file    Attends Hindu Services: Not on file    Active Member of [address] [place] [address] [employer] or Organizations: Not on file    Attends Club or Organization Meetings: Not on file    Marital Status: Not on file   Intimate Partner Violence:     Fear of Current or Ex-Partner: Not on file    Emotionally Abused: Not on file    Physically Abused: Not on file    Sexually Abused: Not on file   Housing Stability:     Unable to Pay for Housing in the Last Year: Not on file    Number of [place] in the Last Year: Not on file    Unstable Housing in the Last Year: Not on file       Current Outpatient Medications   Medication Sig Dispense Refill    Vibegron (GEMTESA) 75 MG TABS Take by mouth      gabapentin (NEURONTIN) 300 MG capsule Take 1 po bid 60 capsule 2    omeprazole (PRILOSEC) 20 MG delayed release capsule Take 1 capsule by mouth every morning (before breakfast) 14 capsule 0    amantadine (SYMMETREL) 100 MG capsule Take 1 capsule by mouth 2 times daily 60 capsule 2    dimethyl fumarate (TECFIDERA) 240 MG delayed release capsule TAKE 1 CAPSULE TWICE A  capsule 3    KLOR-CON M20 20 MEQ extended release tablet TAKE 1 TABLET DAILY 90 tablet 3    primidone (MYSOLINE) 50 MG tablet Take 2 po qAM , 3 po qhs 450 tablet 3    dimethyl fumarate (TECFIDERA) 240 MG delayed release capsule Take 1 capsule by mouth 2 times daily. 180 capsule 3    ALPRAZolam (XANAX) 0.25 MG tablet TAKE ONE TABLET BY MOUTH DAILY AS NEEDED FOR ANXIETY 25 tablet 0    amLODIPine (NORVASC) 5 MG tablet TAKE 1 TABLET DAILY (NEED APPOINTMENT BEFORE FURTHER REFILLS) 90 tablet 3    vitamin D (CHOLECALCIFEROL) 50 MCG (2000 UT) TABS tablet Take 1 tablet by mouth daily 90 tablet 3    PARoxetine (PAXIL CR) 25 MG extended release tablet TAKE 1 TABLET EVERY MORNING 90 tablet 3    lisinopril-hydroCHLOROthiazide (PRINZIDE;ZESTORETIC) 20-25 MG per tablet TAKE 1 TABLET DAILY 90 tablet 3    alendronate (FOSAMAX) 70 MG tablet TAKE 1 TABLET EVERY 7 DAYS 12 tablet 4    Alpha-Lipoic Acid 600 MG CAPS Take by mouth      cyanocobalamin (CVS VITAMIN B12) 1000 MCG tablet Take 1 tablet by mouth daily 90 tablet 3    LIALDA 1.2 G EC tablet Take 2 tablets a day      dimethyl fumarate (TECFIDERA) 240 MG delayed release capsule Take 1 capsule by mouth 2 times daily. 180 capsule 3    aspirin 81 MG EC tablet Take 81 mg by mouth daily. (Patient not taking: Reported on 12/30/2021)       No current facility-administered medications for this visit. No Known Allergies        Review of Systems   Constitutional: Negative. HENT: Negative. Eyes: Negative. Respiratory: Negative. Cardiovascular: Negative. Gastrointestinal: Negative. Endocrine: Negative. Genitourinary: Positive for urgency. Musculoskeletal: Positive for gait problem. Skin: Negative. Allergic/Immunologic: Negative. Neurological: Positive for tremors and weakness. Hematological: Negative. Psychiatric/Behavioral: The patient is nervous/anxious. Objective:   Physical Exam  There were no vitals filed for this visit. Neurological Examination  Constitutional .General exam well groomed   Ears /Nose/Throat: external ear . Normal exam  Neck and thyroid . Normal size  Respiratory . Breathsounds clear bilaterally  Cardiovascular:  Auscultation of heart with regular rate and rhythm and normal heart sounds  Musculoskeletal. Muscle bulk and tone normal                                                               Muscle strength mild giveway all limbs left more than right                                                                               No dysmetria or dysdiadokinesis  Rubral tremor left arm   Normal fine motor  Gait ataxia  Orientation Alert and oriented x 3   Language process and speech normal . No aphasia   Cranial nerve 2 normal acuety and visual fields  Cranial nerve 3, 4 and 6 . Extraocular muscles are intact . Pupils are equal and reactive   Cranial nerve 5 . Normal strength of masseter and temporalis . Intact corneal reflex. Normal facial sensation  Cranial nerve 7 normal exam   Cranial nerve 8. Grossly intact hearing   Cranial nerve 9 and 10. Symmetric palate elevation   Cranial nerve 11 , 5 out of 5 strength   Cranial Nerve 12 midline tongue . No atrophy  Sensation . Normal proprioception . Intact Vibration . Normal pinprick and light touch   Deep Tendon Reflexes normal  Plantar response equivocal bilaterally      Assessment:       1. Multiple sclerosis    2. Gait instability    3. Tremor    Rubral tremor is very refractory to treatment to stop amantadine to go on trial of neurontin staying on mysoline       As above     Ignacio Brothers, was evaluated through a synchronous (real-time) audio-video encounter. The patient (or guardian if applicable) is aware that this is a billable service. Verbal consent to proceed has been obtained within the past 12 months. The visit was conducted pursuant to the emergency declaration under the [address] [place] [address] authority and the [email or web address] General Act. Patient identification was verified, and a caregiver was present when appropriate. The patient was located in a state where the provider was credentialed to provide care.     --Charles Neil, MD on 1/3/2022 at 10:27 AM    An electronic signature was used to authenticate this note.

## 2022-01-03 NOTE — TELEPHONE ENCOUNTER
Pharmacy requesting refill of amantadine 100 mg.       Medication active on med list yes      Date of last Rx: 10/13/2021 with 2 refills          verified by YOKO TALAVERA      Date of last appointment 12/30/2021    Next Visit Date:  Visit date not found

## 2022-01-04 RX ORDER — AMANTADINE HYDROCHLORIDE 100 MG/1
100 CAPSULE, GELATIN COATED ORAL 2 TIMES DAILY
Qty: 180 CAPSULE | Refills: 0 | Status: SHIPPED | OUTPATIENT
Start: 2022-01-04

## 2022-01-27 RX ORDER — LISINOPRIL AND HYDROCHLOROTHIAZIDE 25; 20 MG/1; MG/1
TABLET ORAL
Qty: 90 TABLET | Refills: 3 | Status: SHIPPED | OUTPATIENT
Start: 2022-01-27

## 2022-02-25 RX ORDER — PAROXETINE HYDROCHLORIDE 25 MG/1
TABLET, FILM COATED, EXTENDED RELEASE ORAL
Qty: 90 TABLET | Refills: 3 | Status: SHIPPED | OUTPATIENT
Start: 2022-02-25

## 2022-03-28 DIAGNOSIS — G35 MULTIPLE SCLEROSIS (HCC): ICD-10-CM

## 2022-03-28 RX ORDER — DIMETHYL FUMARATE 240 MG/1
CAPSULE ORAL
Qty: 180 CAPSULE | Refills: 3 | OUTPATIENT
Start: 2022-03-28

## 2022-03-30 RX ORDER — GABAPENTIN 300 MG/1
CAPSULE ORAL
Qty: 180 CAPSULE | Refills: 1 | Status: SHIPPED | OUTPATIENT
Start: 2022-03-30 | End: 2022-09-28

## 2022-03-30 NOTE — TELEPHONE ENCOUNTER
Patient  requesting refill of [facility]abapentin [facility]      Medication active on med list yes      Date of last fill: 12/30/21  with 2 refills verified on 3/30/22  verified by FLORIDA COLE      Date of last appointment 12/30/21    Next Visit Date:  Visit date not found

## 2022-04-01 DIAGNOSIS — G35 MULTIPLE SCLEROSIS (HCC): ICD-10-CM

## 2022-04-01 RX ORDER — DIMETHYL FUMARATE 240 MG/1
CAPSULE ORAL
Qty: 180 CAPSULE | Refills: 3 | OUTPATIENT
Start: 2022-04-01

## 2022-05-23 RX ORDER — POTASSIUM CHLORIDE 1500 MG/1
TABLET, EXTENDED RELEASE ORAL
Qty: 90 TABLET | Refills: 3 | Status: SHIPPED | OUTPATIENT
Start: 2022-05-23

## 2022-06-06 DIAGNOSIS — G35 MULTIPLE SCLEROSIS (HCC): Primary | ICD-10-CM

## 2022-06-06 RX ORDER — PRIMIDONE 50 MG/1
TABLET ORAL
Qty: 450 TABLET | Refills: 3 | Status: SHIPPED | OUTPATIENT
Start: 2022-06-06

## 2022-06-06 RX ORDER — DIMETHYL FUMARATE 240 MG/1
CAPSULE ORAL
Qty: 180 CAPSULE | Refills: 3 | Status: SHIPPED | OUTPATIENT
Start: 2022-06-06

## 2022-06-06 NOTE — TELEPHONE ENCOUNTER
Pharmacy requesting refill of primidone (MYSOLINE) 50 MG tablet      Medication active on med list yes      Date of last Rx: 04/28/2021 with 3 refills          verified by YOKO BELLO      Date of last appointment 12/30/2021    Next Visit Date:  Visit date not found    Patient was to follow up in office in 2 months time. I called the listed home phone asking for a return call to schedule an appointment.

## 2022-06-26 DIAGNOSIS — I10 BENIGN ESSENTIAL HTN: ICD-10-CM

## 2022-06-27 RX ORDER — AMLODIPINE BESYLATE 5 MG/1
TABLET ORAL
Qty: 90 TABLET | Refills: 3 | Status: SHIPPED | OUTPATIENT
Start: 2022-06-27

## 2022-09-28 RX ORDER — GABAPENTIN 300 MG/1
CAPSULE ORAL
Qty: 180 CAPSULE | Refills: 1 | Status: SHIPPED | OUTPATIENT
Start: 2022-09-28 | End: 2023-03-28

## 2022-09-28 NOTE — TELEPHONE ENCOUNTER
Pharmacy requesting refill of Gabapentin 300 mg. Medication active on med list yes      Date of last Rx: 03/30/22 90 day supply with 1 refill          verified by YOKO HOLMAN      Date of last appointment 12/30/21    Next Visit Date:  Last visit 9/22/22 cancelled due to provider not being here.

## 2022-10-10 DIAGNOSIS — R10.13 DYSPEPSIA: ICD-10-CM

## 2022-10-10 RX ORDER — OMEPRAZOLE 20 MG/1
CAPSULE, DELAYED RELEASE ORAL
Qty: 90 CAPSULE | Refills: 3 | OUTPATIENT
Start: 2022-10-10

## 2022-10-24 DIAGNOSIS — R10.13 DYSPEPSIA: ICD-10-CM

## 2022-10-24 RX ORDER — OMEPRAZOLE 20 MG/1
20 CAPSULE, DELAYED RELEASE ORAL
Qty: 90 CAPSULE | Refills: 0 | Status: SHIPPED | OUTPATIENT
Start: 2022-10-24

## 2022-11-21 ENCOUNTER — TELEMEDICINE (OUTPATIENT)
Dept: NEUROLOGY | Age: 67
End: 2022-11-21
Payer: COMMERCIAL

## 2022-11-21 DIAGNOSIS — R26.81 GAIT INSTABILITY: ICD-10-CM

## 2022-11-21 DIAGNOSIS — R25.1 TREMOR: ICD-10-CM

## 2022-11-21 DIAGNOSIS — G35 MULTIPLE SCLEROSIS (HCC): Primary | ICD-10-CM

## 2022-11-21 PROCEDURE — 99214 OFFICE O/P EST MOD 30 MIN: CPT | Performed by: PSYCHIATRY & NEUROLOGY

## 2022-11-21 RX ORDER — TOPIRAMATE 25 MG/1
TABLET ORAL
Qty: 60 TABLET | Refills: 3 | Status: SHIPPED | OUTPATIENT
Start: 2022-11-21

## 2022-11-21 ASSESSMENT — ENCOUNTER SYMPTOMS
ALLERGIC/IMMUNOLOGIC NEGATIVE: 1
EYES NEGATIVE: 1
GASTROINTESTINAL NEGATIVE: 1
RESPIRATORY NEGATIVE: 1

## 2022-11-21 NOTE — PROGRESS NOTES
Subjective:      Patient ID: Darvin Patten is a 61 y.o. female. HPI  Active Problem remitting relapsing multiple sclerosis with imbalance and ataxia on tecfidera with rubral temor to go on amantadine trial per Dr Sammy Lozano to wean off keppra staying on mysoline . She would also like urology consultation . There is elevated anticardiolipin Ab on aspirin. The condition is she reports amantadine 100 mg po bid did not make any effect in tremor control remaining on mysoline 100 mg po q  mg po qhs . She reports left arm tremor and head tremor to be disruptive being more prominent as she gets older remaining on mysoline 100 mg po qAM 150 mg po qhs reporting mild drowsiness . Her insurance would not cover tecfidera with insurance generic tecfidera generic being very expensive stopping this medication . Mysoline will be of mild improvement in that tremor is worse when she misses this medication . Keppra and neurontin previously were of no help . There is unsteadiness of gait using rolling walker . She has had prior fall in fracturing left ankle having surgery . She reports anything she carries with lef arm will lead to tremor in that arm not on right arm . She is using tecfidera tolerating this well . There will be left leg giveway after being on her feet over 20 minutes . There is no visual or bulbar complaints . There is urinary urgency with occasional incontinence . Significant medications  aspirin 81 mg po qd, mysoline 100 mg po qAM 150 mg po qhs  , vitamin D 2000 units qd., keppra 1000 mg po bid  . Previously on avonex and tecfidera . Previus on keppra , amantadine . There is urinary frequency and urgency with occasional incontinence. Testing MRI of Head with few foci periventricular white matter supratentorially. The largest is 1.6 cm parietal periventricular area. There is no enhancement . There is also 1 cm right parietal extra axial meningioma, February 2011.  MRI of cervical spine with small patchy areas within the cord from C3-4 through C4-5 with no enhancement. There is bulging at C4-5 along with C5-6. CSF analysis with elevated IgG with oligoclonal bands. MORIAH 1:140, anticardiolipin IgM 66 . Folate 10 , B12 263. MRI of cervical spine with multifocal cord signal abnormalities with no enhancement ilateral periventricular and deep white matter T2  hyperintensities , 2017 . Mysoline level 9. 6(5-12) . MRI of Head with bilateral periventricular corona radiata  T2 FLAIR signals within supratentorial compartment with no enhancement , 2019 . Primidone level 9.6 .  MRI of Head with stable subcortical white matter T2 weighted cojoint black with black holes with 9 mm right parietal convexity meningioma , 2021      Past Medical History:   Diagnosis Date    Crohn's disease ([address]     Diverticulosis     Hepatitis     Hypertension     Multiple sclerosis [address] [address]     Osteopenia     Psoriasis        Past Surgical History:   Procedure Laterality Date    ANKLE FRACTURE SURGERY  2021    left    APPENDECTOMY       SECTION      CHOLECYSTECTOMY      COLONOSCOPY  2013, 2015    CYST REMOVAL      FOOT SURGERY  2012    right foot bunionectomy    TONSILLECTOMY AND ADENOIDECTOMY      WRIST FRACTURE SURGERY         Family History   Problem Relation Age of Onset    Cirrhosis Mother     Heart Disease Father        Social History     Socioeconomic History    Marital status:      Spouse name: None    Number of children: None    Years of education: None    Highest education level: None   Tobacco Use    Smoking status: Never    Smokeless tobacco: Never   Vaping Use    Vaping Use: Never used   Substance and Sexual Activity    Alcohol use: No     Alcohol/week: 0.0 standard drinks    Drug use: No       Current Outpatient Medications   Medication Sig Dispense Refill    topiramate (TOPAMAX) 25 MG tablet Take 1 po qhs x 1 week then 1 po bid 60 tablet 3    omeprazole (PRILOSEC) 20 MG delayed release capsule Take 1 capsule by mouth every morning (before breakfast) 90 capsule 0    gabapentin (NEURONTIN) 300 MG capsule TAKE 1 CAPSULE TWICE A  capsule 1    amLODIPine (NORVASC) 5 MG tablet TAKE 1 TABLET DAILY (NEED APPOINTMENT BEFORE FURTHER REFILLS) 90 tablet 3    primidone (MYSOLINE) 50 MG tablet TAKE 2 TABLETS EVERY MORNING AND 3 TABLETS AT BEDTIME (NEED TO CALL FOR APPOINTMENT) 450 tablet 3    KLOR-CON M20 20 MEQ extended release tablet TAKE 1 TABLET DAILY 90 tablet 3    PARoxetine (PAXIL CR) 25 MG extended release tablet TAKE 1 TABLET EVERY MORNING 90 tablet 3    lisinopril-hydroCHLOROthiazide (PRINZIDE;ZESTORETIC) 20-25 MG per tablet TAKE 1 TABLET DAILY 90 tablet 3    vitamin D (CHOLECALCIFEROL) 50 MCG (2000 UT) TABS tablet Take 1 tablet by mouth daily 90 tablet 3    cyanocobalamin ([employer] VITAMIN B12) 1000 MCG tablet Take 1 tablet by mouth daily (Patient not taking: Reported on 11/21/2022) 90 tablet 3     No current facility-administered medications for this visit. No Known Allergies        Review of Systems   Constitutional: Negative. HENT: Negative. Eyes: Negative. Respiratory: Negative. Cardiovascular: Negative. Gastrointestinal: Negative. Endocrine: Negative. Genitourinary:  Positive for urgency. Musculoskeletal:  Positive for gait problem. Skin: Negative. Allergic/Immunologic: Negative. Neurological:  Positive for tremors and weakness. Hematological: Negative. Psychiatric/Behavioral:  The patient is nervous/anxious. Objective:   Physical Exam  There were no vitals filed for this visit. Neurological Examination  Constitutional .General exam well groomed   Ears /Nose/Throat: external ear . Normal exam  Neck and thyroid . Normal size  Respiratory . Breathsounds clear bilaterally  Cardiovascular:  Auscultation of heart with regular rate and rhythm and normal heart sounds  Musculoskeletal. Muscle bulk and tone normal Muscle strength mild giveway all limbs left more than right                                                                               No dysmetria or dysdiadokinesis  Rubral tremor left arm   Normal fine motor  Gait ataxia  Orientation Alert and oriented x 3   Language process and speech normal . No aphasia   Cranial nerve 2 normal acuety and visual fields  Cranial nerve 3, 4 and 6 . Extraocular muscles are intact . Pupils are equal and reactive   Cranial nerve 5 . Normal strength of masseter and temporalis . Intact corneal reflex. Normal facial sensation  Cranial nerve 7 normal exam   Cranial nerve 8. Grossly intact hearing   Cranial nerve 9 and 10. Symmetric palate elevation   Cranial nerve 11 , 5 out of 5 strength   Cranial Nerve 12 midline tongue . No atrophy  Sensation . Normal proprioception . Intact Vibration . Normal pinprick and light touch   Deep Tendon Reflexes normal  Plantar response equivocal bilaterally      Assessment:       1. Multiple sclerosis (HCC)    2. Gait instability    3. Tremor    Will add topamax to current mysoline as new medication trial for tremor . She is to proceed for medication approval for vumerity to undergo FU MRI of Head       Orders Placed This Encounter   Procedures    MRI BRAIN W WO CONTRAST     Standing Status:   Future     Standing Expiration Date:   11/21/2023     Order Specific Question:   STAT Creatinine as needed:     Answer:   Yes        Dorian Marques, was evaluated through a synchronous (real-time) audio-video encounter. The patient (or guardian if applicable) is aware that this is a billable service. Verbal consent to proceed has been obtained within the past 12 months. The visit was conducted pursuant to the emergency declaration under the [facility], [address] [place] [address] [facility] authority and the [employer] General Act.   Patient identification was verified, and a caregiver was present when appropriate. The patient was located in a state where the provider was credentialed to provide care. --Ernesot Alva MD on 11/21/2022 at 6:23 PM    An electronic signature was used to authenticate this note.

## 2022-11-22 ENCOUNTER — TELEPHONE (OUTPATIENT)
Dept: NEUROLOGY | Age: 67
End: 2022-11-22

## 2022-11-22 NOTE — TELEPHONE ENCOUNTER
Keshia Etienne from [facility] called the office this morning stating that the patient was requesting a new dimethyl fumarate Rx. Looking at the chart I see that the patient had a follow up with Dr. Jason Espinoza yesterday and he mentioned that she stopped the medication and they would be starting her on Vumerity. I asked Keshia Etienne to check on this again and then to have the patient contact the office. She stated that she would do this.

## 2022-11-23 ENCOUNTER — TELEPHONE (OUTPATIENT)
Dept: NEUROLOGY | Age: 67
End: 2022-11-23

## 2022-11-23 NOTE — TELEPHONE ENCOUNTER
Dr. Rex Minor advised that he would like pt. to switch to Vumerity due to ins. issues and high copay even for generic dimethylfumarate. I called rebecca for  Ana to return  my call. She will need to fill out a Vumerity START form.

## 2022-11-23 NOTE — TELEPHONE ENCOUNTER
Patient's  checked with insurance and confirmed the medication will be covered but it will require a Prior Authorization.  will look into finding the START form on his computer.  may need some assistance as to where to find it, he sounded a little unsure.

## 2022-12-07 NOTE — TELEPHONE ENCOUNTER
Rut Monsivais called in. They approved the vumerity. Approved from 11-7-22-12-7-2023 Case ID [id number].

## 2022-12-09 ENCOUNTER — HOSPITAL ENCOUNTER (OUTPATIENT)
Age: 67
Setting detail: SPECIMEN
Discharge: HOME | End: 2022-12-09

## 2022-12-09 ENCOUNTER — OFFICE VISIT (OUTPATIENT)
Dept: PRIMARY CARE CLINIC | Age: 67
End: 2022-12-09
Payer: COMMERCIAL

## 2022-12-09 VITALS
OXYGEN SATURATION: 96 % | HEART RATE: 72 BPM | BODY MASS INDEX: 47.2 KG/M2 | SYSTOLIC BLOOD PRESSURE: 120 MMHG | DIASTOLIC BLOOD PRESSURE: 80 MMHG | WEIGHT: 250 LBS | HEIGHT: 61 IN

## 2022-12-09 DIAGNOSIS — Z12.31 BREAST CANCER SCREENING BY MAMMOGRAM: ICD-10-CM

## 2022-12-09 DIAGNOSIS — Z00.00 ANNUAL PHYSICAL EXAM: Primary | ICD-10-CM

## 2022-12-09 DIAGNOSIS — M25.475 BILATERAL SWELLING OF FEET AND ANKLES: ICD-10-CM

## 2022-12-09 DIAGNOSIS — Z13.21 ENCOUNTER FOR VITAMIN DEFICIENCY SCREENING: ICD-10-CM

## 2022-12-09 DIAGNOSIS — L82.1 SEBORRHEIC KERATOSES: ICD-10-CM

## 2022-12-09 DIAGNOSIS — I10 BENIGN ESSENTIAL HTN: ICD-10-CM

## 2022-12-09 DIAGNOSIS — M25.472 BILATERAL SWELLING OF FEET AND ANKLES: ICD-10-CM

## 2022-12-09 DIAGNOSIS — G35 MULTIPLE SCLEROSIS (HCC): ICD-10-CM

## 2022-12-09 DIAGNOSIS — M25.474 BILATERAL SWELLING OF FEET AND ANKLES: ICD-10-CM

## 2022-12-09 DIAGNOSIS — M25.471 BILATERAL SWELLING OF FEET AND ANKLES: ICD-10-CM

## 2022-12-09 DIAGNOSIS — R10.13 DYSPEPSIA: ICD-10-CM

## 2022-12-09 DIAGNOSIS — F41.9 ANXIETY: ICD-10-CM

## 2022-12-09 LAB
ABSOLUTE EOS #: 0.17 K/UL (ref 0–0.44)
ABSOLUTE IMMATURE GRANULOCYTE: 0.09 K/UL (ref 0–0.3)
ABSOLUTE LYMPH #: 2.71 K/UL (ref 1.1–3.7)
ABSOLUTE MONO #: 1.13 K/UL (ref 0.1–1.2)
ALBUMIN SERPL-MCNC: 4.2 G/DL (ref 3.5–5.2)
ALBUMIN/GLOBULIN RATIO: 1.1 (ref 1–2.5)
ALP BLD-CCNC: 106 U/L (ref 35–104)
ALT SERPL-CCNC: 14 U/L (ref 5–33)
ANION GAP SERPL CALCULATED.3IONS-SCNC: 14 MMOL/L (ref 9–17)
AST SERPL-CCNC: 18 U/L
BASOPHILS # BLD: 1 % (ref 0–2)
BASOPHILS ABSOLUTE: 0.06 K/UL (ref 0–0.2)
BILIRUB SERPL-MCNC: 0.2 MG/DL (ref 0.3–1.2)
BUN BLDV-MCNC: 20 MG/DL (ref 8–23)
CALCIUM SERPL-MCNC: 9.7 MG/DL (ref 8.6–10.4)
CHLORIDE BLD-SCNC: 104 MMOL/L (ref 98–107)
CO2: 24 MMOL/L (ref 20–31)
CREAT SERPL-MCNC: 0.9 MG/DL (ref 0.5–0.9)
EOSINOPHILS RELATIVE PERCENT: 1 % (ref 1–4)
GFR SERPL CREATININE-BSD FRML MDRD: >60 ML/MIN/1.73M2
GLUCOSE BLD-MCNC: 93 MG/DL (ref 70–99)
HCT VFR BLD CALC: 44.4 % (ref 36.3–47.1)
HEMOGLOBIN: 13.4 G/DL (ref 11.9–15.1)
IMMATURE GRANULOCYTES: 1 %
LYMPHOCYTES # BLD: 22 % (ref 24–43)
MCH RBC QN AUTO: 27 PG (ref 25.2–33.5)
MCHC RBC AUTO-ENTMCNC: 30.2 G/DL (ref 28.4–34.8)
MCV RBC AUTO: 89.5 FL (ref 82.6–102.9)
MONOCYTES # BLD: 9 % (ref 3–12)
NRBC AUTOMATED: 0 PER 100 WBC
PDW BLD-RTO: 15.9 % (ref 11.8–14.4)
PLATELET # BLD: 405 K/UL (ref 138–453)
PMV BLD AUTO: 10.8 FL (ref 8.1–13.5)
POTASSIUM SERPL-SCNC: 4.6 MMOL/L (ref 3.7–5.3)
RBC # BLD: 4.96 M/UL (ref 3.95–5.11)
RBC # BLD: ABNORMAL 10*6/UL
SEG NEUTROPHILS: 66 % (ref 36–65)
SEGMENTED NEUTROPHILS ABSOLUTE COUNT: 8.03 K/UL (ref 1.5–8.1)
SODIUM BLD-SCNC: 142 MMOL/L (ref 135–144)
TOTAL PROTEIN: 8.1 G/DL (ref 6.4–8.3)
VITAMIN D 25-HYDROXY: 37.1 NG/ML
WBC # BLD: 12.2 K/UL (ref 3.5–11.3)

## 2022-12-09 PROCEDURE — 99396 PREV VISIT EST AGE 40-64: CPT | Performed by: NURSE PRACTITIONER

## 2022-12-09 PROCEDURE — 3074F SYST BP LT 130 MM HG: CPT | Performed by: NURSE PRACTITIONER

## 2022-12-09 PROCEDURE — 3078F DIAST BP <80 MM HG: CPT | Performed by: NURSE PRACTITIONER

## 2022-12-09 RX ORDER — ALPRAZOLAM 0.25 MG/1
0.25 TABLET ORAL DAILY PRN
Qty: 30 TABLET | Refills: 0 | Status: SHIPPED | OUTPATIENT
Start: 2022-12-09 | End: 2023-01-08

## 2022-12-09 RX ORDER — ALPRAZOLAM 0.25 MG/1
0.25 TABLET ORAL DAILY PRN
COMMUNITY
End: 2022-12-09 | Stop reason: SDUPTHER

## 2022-12-09 SDOH — ECONOMIC STABILITY: FOOD INSECURITY: WITHIN THE PAST 12 MONTHS, THE FOOD YOU BOUGHT JUST DIDN'T LAST AND YOU DIDN'T HAVE MONEY TO GET MORE.: NEVER TRUE

## 2022-12-09 SDOH — ECONOMIC STABILITY: FOOD INSECURITY: WITHIN THE PAST 12 MONTHS, YOU WORRIED THAT YOUR FOOD WOULD RUN OUT BEFORE YOU GOT MONEY TO BUY MORE.: NEVER TRUE

## 2022-12-09 ASSESSMENT — ENCOUNTER SYMPTOMS
SHORTNESS OF BREATH: 0
WHEEZING: 0
SORE THROAT: 0
SINUS PRESSURE: 0
NAUSEA: 0
BLOOD IN STOOL: 0
COUGH: 0
TROUBLE SWALLOWING: 0
CONSTIPATION: 0
DIARRHEA: 0
ABDOMINAL PAIN: 0
BLURRED VISION: 0
VOMITING: 0

## 2022-12-09 ASSESSMENT — PATIENT HEALTH QUESTIONNAIRE - PHQ9
2. FEELING DOWN, DEPRESSED OR HOPELESS: 0
SUM OF ALL RESPONSES TO PHQ QUESTIONS 1-9: 0
SUM OF ALL RESPONSES TO PHQ QUESTIONS 1-9: 0
1. LITTLE INTEREST OR PLEASURE IN DOING THINGS: 0
SUM OF ALL RESPONSES TO PHQ QUESTIONS 1-9: 0
SUM OF ALL RESPONSES TO PHQ9 QUESTIONS 1 & 2: 0
SUM OF ALL RESPONSES TO PHQ QUESTIONS 1-9: 0
SUM OF ALL RESPONSES TO PHQ9 QUESTIONS 1 & 2: 0
SUM OF ALL RESPONSES TO PHQ QUESTIONS 1-9: 0
SUM OF ALL RESPONSES TO PHQ QUESTIONS 1-9: 0
2. FEELING DOWN, DEPRESSED OR HOPELESS: 0
SUM OF ALL RESPONSES TO PHQ QUESTIONS 1-9: 0
SUM OF ALL RESPONSES TO PHQ QUESTIONS 1-9: 0
1. LITTLE INTEREST OR PLEASURE IN DOING THINGS: 0

## 2022-12-09 ASSESSMENT — SOCIAL DETERMINANTS OF HEALTH (SDOH): HOW HARD IS IT FOR YOU TO PAY FOR THE VERY BASICS LIKE FOOD, HOUSING, MEDICAL CARE, AND HEATING?: NOT HARD AT ALL

## 2022-12-09 NOTE — PROGRESS NOTES
[address] PRIMARY CARE  [facility] [address] [facility] [address]  [address] [address]  Dept: [phone]  Dept Fax: [phone]    Kevan Harris is a 61 y.o. female who presentstoday for her medical conditions/complaints as noted below. Kevan Harris is c/o of  Chief Complaint   Patient presents with    Annual Exam    Other     Handicap plaque, would llike to discuss patches of discoloration on breast area. Foot Swelling     Bilateral feet swelling has noticed it happens on and off for the past year       HPI:     Here today with her spouse for annual exam  She reports her MS has continued to decline, is being started on a new medication, Vumerity,per her neurologist. She is still waiting to receive this as required authorization    Has been noticing some moles under her bilateral breast and abdomen  She states they start out looking tan and flat then turns brownish and becomes crusty, will often fall off  No itching or bleeding    Asking about some foot and ankle swelling she has been noticing lately  Reports has tried compression knee-high's but not recently  Recalls the few times that she wore them it did seem to help  The swelling is not persistent and typically resolves by morning  Admits she spends a lot of time doing prolonged sitting with legs down as her mobility is limited due to MS    Requesting refill on Xanax  Reports uses at times of social anxiety or extreme stress  Received 25 tablets in 2021, no further refills since that time  Generally her Paxil keeps her symptoms well controlled    Hypertension  This is a chronic problem. The current episode started more than 1 year ago. The problem is controlled. Associated symptoms include peripheral edema (Mild in ankles). Pertinent negatives include no blurred vision, chest pain, headaches, palpitations or shortness of breath. Past treatments include ACE inhibitors, diuretics and calcium channel blockers.  The current treatment provides significant improvement. There are no compliance problems. There is no history of CAD/MI or CVA. Hemoglobin A1C (%)   Date Value   2019 5.3             ( goal A1C is < 7)   No results found for: LABMICR  LDL Cholesterol (mg/dL)   Date Value   2021 98   2015 129     LDL Calculated (mg/dL)   Date Value   2019 105   2016 128   03/10/2014 114       (goal LDL is <100)   AST (U/L)   Date Value   2021 17     ALT (U/L)   Date Value   2021 12     BUN (mg/dL)   Date Value   2021 16     BP Readings from Last 3 Encounters:   22 120/80   21 126/72   21 124/72          ([id number])    Past Medical History:   Diagnosis Date    Crohn's disease ([facility] [address]     Diverticulosis     Hepatitis     Hypertension     Multiple sclerosis ([facility] [address]     Osteopenia     Psoriasis       Past Surgical History:   Procedure Laterality Date    ANKLE FRACTURE SURGERY  2021    left    APPENDECTOMY       SECTION      CHOLECYSTECTOMY      COLONOSCOPY  2013, 2015    CYST REMOVAL      FOOT SURGERY  2012    right foot bunionectomy    TONSILLECTOMY AND ADENOIDECTOMY      WRIST FRACTURE SURGERY         Family History   Problem Relation Age of Onset    Cirrhosis Mother     Heart Disease Father           Social History     Tobacco Use    Smoking status: Never    Smokeless tobacco: Never   Substance Use Topics    Alcohol use: No     Alcohol/week: 0.0 standard drinks      Current Outpatient Medications   Medication Sig Dispense Refill    ALPRAZolam (XANAX) 0.25 MG tablet Take 1 tablet by mouth daily as needed for Anxiety for up to 30 days. 30 tablet 0    Misc.  Devices MISC Provide handicap placard, expires 5 years from this date 2027  Medical conditions prevent the ability to walk long distances 1 each 0    topiramate (TOPAMAX) 25 MG tablet Take 1 po qhs x 1 week then 1 po bid 60 tablet 3    omeprazole (PRILOSEC) 20 MG delayed release capsule Take 1 capsule by mouth every morning (before breakfast) 90 capsule 0    gabapentin (NEURONTIN) 300 MG capsule TAKE 1 CAPSULE TWICE A  capsule 1    amLODIPine (NORVASC) 5 MG tablet TAKE 1 TABLET DAILY (NEED APPOINTMENT BEFORE FURTHER REFILLS) 90 tablet 3    primidone (MYSOLINE) 50 MG tablet TAKE 2 TABLETS EVERY MORNING AND 3 TABLETS AT BEDTIME (NEED TO CALL FOR APPOINTMENT) 450 tablet 3    KLOR-CON M20 20 MEQ extended release tablet TAKE 1 TABLET DAILY 90 tablet 3    PARoxetine (PAXIL CR) 25 MG extended release tablet TAKE 1 TABLET EVERY MORNING 90 tablet 3    lisinopril-hydroCHLOROthiazide (PRINZIDE;ZESTORETIC) 20-25 MG per tablet TAKE 1 TABLET DAILY 90 tablet 3    vitamin D (CHOLECALCIFEROL) 50 MCG (2000 UT) TABS tablet Take 1 tablet by mouth daily 90 tablet 3    cyanocobalamin ([facility] VITAMIN B12) 1000 MCG tablet Take 1 tablet by mouth daily 90 tablet 3     No current facility-administered medications for this visit. No Known Allergies    Health Maintenance   Topic Date Due    DTaP/Tdap/Td vaccine (1 - Tdap) Never done    Cervical cancer screen  Never done    Shingles vaccine (1 of 2) Never done    Breast cancer screen  06/25/2018    Colorectal Cancer Screen  05/30/2019    COVID-19 Vaccine (5 - Booster for Pfizer series) 06/11/2022    Flu vaccine (1) 08/01/2022    Depression Screen  12/09/2023    Lipids  04/02/2026    Hepatitis C screen  Completed    Hepatitis A vaccine  Aged Out    Hib vaccine  Aged Out    Meningococcal (ACWY) vaccine  Aged Out    Pneumococcal 0-64 years Vaccine  Aged Out    HIV screen  Discontinued       Subjective:      Review of Systems   Constitutional:  Negative for activity change, appetite change, chills, fatigue, fever and unexpected weight change. HENT:  Negative for congestion, ear pain, hearing loss, sinus pressure, sore throat and trouble swallowing. Eyes:  Negative for blurred vision and visual disturbance.    Respiratory:  Negative for cough, shortness of breath and wheezing. Cardiovascular:  Positive for leg swelling (Ankles and feet). Negative for chest pain and palpitations. Gastrointestinal:  Negative for abdominal pain, blood in stool, constipation, diarrhea, nausea and vomiting. Endocrine: Negative for cold intolerance, heat intolerance, polydipsia, polyphagia and polyuria. Genitourinary:  Negative for difficulty urinating, frequency, hematuria and urgency. Musculoskeletal:  Positive for gait problem (Secondary to MS). Negative for arthralgias and myalgias. Skin:  Negative for rash. Scattered dry skin lesions on her breast and abdomen   Allergic/Immunologic: Negative for environmental allergies. Neurological:  Positive for weakness (Secondary to MS). Negative for dizziness, light-headedness and headaches. Psychiatric/Behavioral:  Negative for confusion. The patient is not nervous/anxious. Objective:     Physical Exam  Constitutional:       Appearance: Normal appearance. She is well-developed. HENT:      Head: Normocephalic. Eyes:      Conjunctiva/sclera: Conjunctivae normal.      Pupils: Pupils are equal, round, and reactive to light. Cardiovascular:      Rate and Rhythm: Normal rate and regular rhythm. Heart sounds: Normal heart sounds. No murmur heard. Pulmonary:      Effort: Pulmonary effort is normal.      Breath sounds: Normal breath sounds. No wheezing. Abdominal:      General: Bowel sounds are normal. There is no distension. Palpations: Abdomen is soft. Musculoskeletal:         General: Normal range of motion. Cervical back: Normal range of motion. Comments: Bilateral swelling in ankles, nonpitting   Skin:     General: Skin is warm and dry. Comments: Scattered tan-colored raised, crusty lesions under left breast and on abdomen. No surrounding erythema. Neurological:      Mental Status: She is alert and oriented to person, place, and time.    Psychiatric:         Behavior: Behavior normal. Thought Content: Thought content normal.         Judgment: Judgment normal.     /80   Pulse 72   Ht 5' 1\" (1.549 m)   Wt 250 lb (113.4 kg)   SpO2 96%   BMI 47.24 kg/m²     Assessment:       Diagnosis Orders   1. Annual physical exam        2. Breast cancer screening by mammogram  DANIA DIGITAL SCREEN W OR WO CAD BILATERAL      3. Anxiety  ALPRAZolam (XANAX) 0.25 MG tablet      4. Multiple sclerosis ([address]  CBC with Auto Differential    Comprehensive Metabolic Panel    Misc. Devices MISC      5. Benign essential HTN  Comprehensive Metabolic Panel      6. Dyspepsia  CBC with Auto Differential      7. Encounter for vitamin deficiency screening  Vitamin D 25 Hydroxy      8. Bilateral swelling of feet and ankles        9. Seborrheic keratoses                  Plan:      Return in about 6 months (around 6/9/2023) for hypertension check, depression/anxiety-virtual visit. Annual exam-screening labs ordered, screening mammogram ordered, reviewed healthy diet  Anxiety-refill on Xanax for rare use under social anxiety situations. She will also continue daily paroxetine  MS-continues with gradual decline but is generally stable, she will continue regular follow-up with neurologist, she is in process of obtaining new medication Vumerity  Hypertension-well-controlled on ACE diuretic and calcium channel blocker  Dyspepsia stable with daily PPI and avoidance of triggers  Swelling of ankles and feet-advised keeping feet elevated as much as possible when sitting, compression knee-high's daily. If the swelling worsens or she develops pain in the lower legs return to office  Seborrheic keratoses-we will monitor for now, urged to contact office if develops any bleeding or itching from the lesions.   Suggested dermatology evaluation but she prefers to hold off at this time  Orders Placed This Encounter   Procedures    DANIA DIGITAL SCREEN W OR WO CAD BILATERAL     Standing Status:   Future     Standing Expiration Date: 2/9/2024     Order Specific Question:   Reason for exam:     Answer:   Screening for breast cancer    CBC with Auto Differential     Standing Status:   Future     Number of Occurrences:   1     Standing Expiration Date:   12/9/2023    Comprehensive Metabolic Panel     Standing Status:   Future     Number of Occurrences:   1     Standing Expiration Date:   12/9/2023    Vitamin D 25 Hydroxy     Standing Status:   Future     Number of Occurrences:   1     Standing Expiration Date:   12/9/2023        Orders Placed This Encounter   Medications    ALPRAZolam (XANAX) 0.25 MG tablet     Sig: Take 1 tablet by mouth daily as needed for Anxiety for up to 30 days. Dispense:  30 tablet     Refill:  0    Misc. Devices MISC     Sig: Provide handicap placard, expires 5 years from this date 12/9/2027  Medical conditions prevent the ability to walk long distances     Dispense:  1 each     Refill:  0       Patient given educational materials - see patient instructions. Discussed use, benefit, and side effects of prescribed medications. All patientquestions answered. Pt voiced understanding. Reviewed health maintenance. Instructedto continue current medications, diet and exercise. Patient agreed with treatmentplan. Follow up as directed.      Electronicallysigned by Jerl Bernheim, APRN - CNP on 12/9/2022 at 4:03 PM

## 2022-12-12 ENCOUNTER — ANCILLARY ORDERS (OUTPATIENT)
Dept: PRIMARY CARE CLINIC | Age: 67
End: 2022-12-12

## 2022-12-12 ENCOUNTER — HOSPITAL ENCOUNTER (OUTPATIENT)
Dept: WOMENS IMAGING | Age: 67
Discharge: HOME | End: 2022-12-14
Payer: COMMERCIAL

## 2022-12-12 DIAGNOSIS — R92.8 ABNORMAL MAMMOGRAM OF LEFT BREAST: Primary | ICD-10-CM

## 2022-12-12 DIAGNOSIS — Z12.31 BREAST CANCER SCREENING BY MAMMOGRAM: ICD-10-CM

## 2022-12-12 PROCEDURE — 77063 BREAST TOMOSYNTHESIS BI: CPT

## 2022-12-16 ENCOUNTER — HOSPITAL ENCOUNTER (OUTPATIENT)
Dept: MRI IMAGING | Age: 67
Discharge: HOME | End: 2022-12-16
Payer: COMMERCIAL

## 2022-12-16 DIAGNOSIS — G35 MULTIPLE SCLEROSIS (HCC): ICD-10-CM

## 2022-12-16 PROCEDURE — A9579 GAD-BASE MR CONTRAST NOS,1ML: HCPCS | Performed by: PSYCHIATRY & NEUROLOGY

## 2022-12-16 PROCEDURE — 6360000004 HC RX CONTRAST MEDICATION: Performed by: PSYCHIATRY & NEUROLOGY

## 2022-12-16 PROCEDURE — 70553 MRI BRAIN STEM W/O & W/DYE: CPT

## 2022-12-16 RX ADMIN — GADOTERIDOL 20 ML: 279.3 INJECTION, SOLUTION INTRAVENOUS at 14:04

## 2022-12-20 DIAGNOSIS — R25.1 TREMOR: Primary | ICD-10-CM

## 2022-12-20 NOTE — TELEPHONE ENCOUNTER
Patient called in requesting that his 30 day topiramate  25 mg prescription be changed to a 90 day sending it to [facility]. Please advise.

## 2022-12-27 RX ORDER — TOPIRAMATE 25 MG/1
25 TABLET ORAL 2 TIMES DAILY
Qty: 180 TABLET | Refills: 1 | Status: SHIPPED | OUTPATIENT
Start: 2022-12-27

## 2023-01-13 ENCOUNTER — HOSPITAL ENCOUNTER (OUTPATIENT)
Dept: WOMENS IMAGING | Age: 68
End: 2023-01-13
Payer: COMMERCIAL

## 2023-01-13 DIAGNOSIS — N64.89 BREAST ASYMMETRY: ICD-10-CM

## 2023-01-13 DIAGNOSIS — R92.8 ABNORMAL MAMMOGRAM OF LEFT BREAST: ICD-10-CM

## 2023-01-13 PROCEDURE — 76642 ULTRASOUND BREAST LIMITED: CPT

## 2023-01-13 PROCEDURE — 77065 DX MAMMO INCL CAD UNI: CPT

## 2023-01-15 DIAGNOSIS — R10.13 DYSPEPSIA: ICD-10-CM

## 2023-01-16 RX ORDER — OMEPRAZOLE 20 MG/1
CAPSULE, DELAYED RELEASE ORAL
Qty: 90 CAPSULE | Refills: 3 | Status: SHIPPED | OUTPATIENT
Start: 2023-01-16

## 2023-02-15 ENCOUNTER — TELEPHONE (OUTPATIENT)
Dept: NEUROLOGY | Age: 68
End: 2023-02-15

## 2023-02-15 NOTE — TELEPHONE ENCOUNTER
02 15 2023 called patient 3 times to reschedule 03 20 2023 appointment with Dr. Violet Renner, formerly [facility] x 3, no response, cancelled appointment, and mailed letter.   KS

## 2023-03-20 ENCOUNTER — OFFICE VISIT (OUTPATIENT)
Dept: PRIMARY CARE CLINIC | Age: 68
End: 2023-03-20
Payer: COMMERCIAL

## 2023-03-20 VITALS — OXYGEN SATURATION: 97 % | HEART RATE: 69 BPM | SYSTOLIC BLOOD PRESSURE: 122 MMHG | DIASTOLIC BLOOD PRESSURE: 80 MMHG

## 2023-03-20 DIAGNOSIS — G35 MULTIPLE SCLEROSIS (HCC): Primary | ICD-10-CM

## 2023-03-20 DIAGNOSIS — L40.9 SCALP PSORIASIS: ICD-10-CM

## 2023-03-20 DIAGNOSIS — I10 BENIGN ESSENTIAL HTN: ICD-10-CM

## 2023-03-20 DIAGNOSIS — K50.90 CROHN'S DISEASE WITHOUT COMPLICATION, UNSPECIFIED GASTROINTESTINAL TRACT LOCATION (HCC): ICD-10-CM

## 2023-03-20 PROCEDURE — 99214 OFFICE O/P EST MOD 30 MIN: CPT | Performed by: NURSE PRACTITIONER

## 2023-03-20 PROCEDURE — 3079F DIAST BP 80-89 MM HG: CPT | Performed by: NURSE PRACTITIONER

## 2023-03-20 PROCEDURE — 1123F ACP DISCUSS/DSCN MKR DOCD: CPT | Performed by: NURSE PRACTITIONER

## 2023-03-20 PROCEDURE — 3074F SYST BP LT 130 MM HG: CPT | Performed by: NURSE PRACTITIONER

## 2023-03-20 RX ORDER — CLOBETASOL PROPIONATE 0.05 G/100ML
SHAMPOO TOPICAL
Qty: 118 ML | Refills: 2 | Status: SHIPPED | OUTPATIENT
Start: 2023-03-20

## 2023-03-20 RX ORDER — DICYCLOMINE HCL 20 MG
20 TABLET ORAL 4 TIMES DAILY PRN
Qty: 60 TABLET | Refills: 5 | Status: SHIPPED | OUTPATIENT
Start: 2023-03-20

## 2023-03-20 RX ORDER — TRIAMCINOLONE ACETONIDE 5 MG/G
CREAM TOPICAL
Qty: 15 G | Refills: 2 | Status: SHIPPED | OUTPATIENT
Start: 2023-03-20

## 2023-03-20 SDOH — ECONOMIC STABILITY: FOOD INSECURITY: WITHIN THE PAST 12 MONTHS, YOU WORRIED THAT YOUR FOOD WOULD RUN OUT BEFORE YOU GOT MONEY TO BUY MORE.: NEVER TRUE

## 2023-03-20 SDOH — ECONOMIC STABILITY: INCOME INSECURITY: HOW HARD IS IT FOR YOU TO PAY FOR THE VERY BASICS LIKE FOOD, HOUSING, MEDICAL CARE, AND HEATING?: NOT HARD AT ALL

## 2023-03-20 SDOH — ECONOMIC STABILITY: FOOD INSECURITY: WITHIN THE PAST 12 MONTHS, THE FOOD YOU BOUGHT JUST DIDN'T LAST AND YOU DIDN'T HAVE MONEY TO GET MORE.: NEVER TRUE

## 2023-03-20 SDOH — ECONOMIC STABILITY: HOUSING INSECURITY
IN THE LAST 12 MONTHS, WAS THERE A TIME WHEN YOU DID NOT HAVE A STEADY PLACE TO SLEEP OR SLEPT IN A SHELTER (INCLUDING NOW)?: NO

## 2023-03-20 ASSESSMENT — PATIENT HEALTH QUESTIONNAIRE - PHQ9
1. LITTLE INTEREST OR PLEASURE IN DOING THINGS: 0
SUM OF ALL RESPONSES TO PHQ QUESTIONS 1-9: 0
SUM OF ALL RESPONSES TO PHQ9 QUESTIONS 1 & 2: 0
2. FEELING DOWN, DEPRESSED OR HOPELESS: 0
SUM OF ALL RESPONSES TO PHQ QUESTIONS 1-9: 0

## 2023-03-20 ASSESSMENT — ENCOUNTER SYMPTOMS
VOMITING: 0
SORE THROAT: 0
SHORTNESS OF BREATH: 0
BLOOD IN STOOL: 0
BACK PAIN: 1
SINUS PRESSURE: 0
TROUBLE SWALLOWING: 0
COUGH: 0
ABDOMINAL PAIN: 0
CONSTIPATION: 0
NAUSEA: 0
DIARRHEA: 0
WHEEZING: 0

## 2023-03-20 NOTE — PROGRESS NOTES
dicyclomine (BENTYL) 20 MG tablet     Sig: Take 1 tablet by mouth 4 times daily as needed (after loose stool)     Dispense:  60 tablet     Refill:  5       Patient given educational materials - see patient instructions. Discussed use, benefit, and side effects of prescribed medications. All patientquestions answered. Pt voiced understanding. Reviewed health maintenance. Instructedto continue current medications, diet and exercise. Patient agreed with treatmentplan. Follow up as directed.      Electronicallysigned by SJ Monterroso CNP on 3/20/2023 at 9:15 AM

## 2023-03-22 ENCOUNTER — TELEPHONE (OUTPATIENT)
Dept: PRIMARY CARE CLINIC | Age: 68
End: 2023-03-22

## 2023-03-23 RX ORDER — LISINOPRIL AND HYDROCHLOROTHIAZIDE 25; 20 MG/1; MG/1
1 TABLET ORAL DAILY
Qty: 90 TABLET | Refills: 3 | Status: SHIPPED | OUTPATIENT
Start: 2023-03-23

## 2023-03-23 RX ORDER — PAROXETINE HYDROCHLORIDE HEMIHYDRATE 25 MG/1
25 TABLET, FILM COATED, EXTENDED RELEASE ORAL EVERY MORNING
Qty: 90 TABLET | Refills: 3 | Status: SHIPPED | OUTPATIENT
Start: 2023-03-23

## 2023-03-23 RX ORDER — POTASSIUM CHLORIDE 20 MEQ/1
20 TABLET, EXTENDED RELEASE ORAL DAILY
Qty: 90 TABLET | Refills: 3 | Status: SHIPPED | OUTPATIENT
Start: 2023-03-23

## 2023-03-28 RX ORDER — GABAPENTIN 300 MG/1
CAPSULE ORAL
Qty: 180 CAPSULE | Refills: 0 | Status: SHIPPED | OUTPATIENT
Start: 2023-03-28 | End: 2023-09-25

## 2023-03-28 NOTE — TELEPHONE ENCOUNTER
Pharmacy requesting refill of gabapentin 300 mg.       Medication active on med list yes      Date of last Rx: 9/28/2022 with 1 refills          verified by YOKO TALAVERA      Date of last appointment 11/21/2022    Next Visit Date:  Visit date not found

## 2023-04-05 RX ORDER — LISINOPRIL AND HYDROCHLOROTHIAZIDE 25; 20 MG/1; MG/1
1 TABLET ORAL DAILY
Qty: 90 TABLET | Refills: 3 | Status: SHIPPED | OUTPATIENT
Start: 2023-04-05

## 2023-04-05 NOTE — TELEPHONE ENCOUNTER
Last Visit Date: 3/20/2023   Next Visit Date: 6/9/2023     [facility] called into office, pt in need of this medication, they did not receive previous refill.

## 2023-04-24 NOTE — TELEPHONE ENCOUNTER
I called [facility] and advised them that her insurance had approved it. I called Leticia Nelson and left a message. Doxepin Counseling:  Patient advised that the medication is sedating and not to drive a car after taking this medication. Patient informed of potential adverse effects including but not limited to dry mouth, urinary retention, and blurry vision.  The patient verbalized understanding of the proper use and possible adverse effects of doxepin.  All of the patient's questions and concerns were addressed.

## 2023-05-01 DIAGNOSIS — I10 BENIGN ESSENTIAL HTN: ICD-10-CM

## 2023-05-01 RX ORDER — AMLODIPINE BESYLATE 5 MG/1
5 TABLET ORAL DAILY
Qty: 90 TABLET | Refills: 3 | Status: SHIPPED | OUTPATIENT
Start: 2023-05-01

## 2023-05-15 RX ORDER — PRIMIDONE 50 MG/1
TABLET ORAL
Qty: 450 TABLET | Refills: 3 | Status: SHIPPED | OUTPATIENT
Start: 2023-05-15

## 2023-06-06 ENCOUNTER — TELEPHONE (OUTPATIENT)
Dept: NEUROLOGY | Age: 68
End: 2023-06-06

## 2023-06-06 NOTE — TELEPHONE ENCOUNTER
Received a fax from [address] [place] [address] to refill pt's Vumerity. Looking in her chart, she has started seeing Dr Alexys Méndez as of 5/23/23 per her request. She is no longer under our care, we will no longer fill her medications from this office.

## 2023-06-09 ENCOUNTER — TELEMEDICINE (OUTPATIENT)
Dept: PRIMARY CARE CLINIC | Age: 68
End: 2023-06-09
Payer: COMMERCIAL

## 2023-06-09 DIAGNOSIS — G35 MULTIPLE SCLEROSIS (HCC): ICD-10-CM

## 2023-06-09 DIAGNOSIS — I10 BENIGN ESSENTIAL HTN: Primary | ICD-10-CM

## 2023-06-09 DIAGNOSIS — F41.9 ANXIETY: ICD-10-CM

## 2023-06-09 DIAGNOSIS — E78.5 DYSLIPIDEMIA: ICD-10-CM

## 2023-06-09 PROCEDURE — 99214 OFFICE O/P EST MOD 30 MIN: CPT | Performed by: NURSE PRACTITIONER

## 2023-06-09 PROCEDURE — 1123F ACP DISCUSS/DSCN MKR DOCD: CPT | Performed by: NURSE PRACTITIONER

## 2023-06-09 RX ORDER — CLONAZEPAM 0.5 MG/1
0.5 TABLET ORAL 3 TIMES DAILY
COMMUNITY

## 2023-06-09 SDOH — ECONOMIC STABILITY: INCOME INSECURITY: HOW HARD IS IT FOR YOU TO PAY FOR THE VERY BASICS LIKE FOOD, HOUSING, MEDICAL CARE, AND HEATING?: NOT HARD AT ALL

## 2023-06-09 SDOH — ECONOMIC STABILITY: FOOD INSECURITY: WITHIN THE PAST 12 MONTHS, YOU WORRIED THAT YOUR FOOD WOULD RUN OUT BEFORE YOU GOT MONEY TO BUY MORE.: NEVER TRUE

## 2023-06-09 SDOH — ECONOMIC STABILITY: FOOD INSECURITY: WITHIN THE PAST 12 MONTHS, THE FOOD YOU BOUGHT JUST DIDN'T LAST AND YOU DIDN'T HAVE MONEY TO GET MORE.: NEVER TRUE

## 2023-06-09 SDOH — ECONOMIC STABILITY: TRANSPORTATION INSECURITY
IN THE PAST 12 MONTHS, HAS LACK OF TRANSPORTATION KEPT YOU FROM MEETINGS, WORK, OR FROM GETTING THINGS NEEDED FOR DAILY LIVING?: NO

## 2023-06-09 ASSESSMENT — ENCOUNTER SYMPTOMS
WHEEZING: 0
SORE THROAT: 0
SINUS PRESSURE: 0
DIARRHEA: 0
BLOOD IN STOOL: 0
SHORTNESS OF BREATH: 0
ABDOMINAL PAIN: 0
VOMITING: 0
COUGH: 0
TROUBLE SWALLOWING: 0
NAUSEA: 0
CONSTIPATION: 0

## 2023-06-09 NOTE — PROGRESS NOTES
Osteopenia     Psoriasis       Past Surgical History:   Procedure Laterality Date    ANKLE FRACTURE SURGERY  2021    left    APPENDECTOMY       SECTION      CHOLECYSTECTOMY      COLONOSCOPY  2013, 2015    CYST REMOVAL      FOOT SURGERY  2012    right foot bunionectomy    TONSILLECTOMY AND ADENOIDECTOMY      WRIST FRACTURE SURGERY         Family History   Problem Relation Age of Onset    Cirrhosis Mother     Heart Disease Father           Social History     Tobacco Use    Smoking status: Never    Smokeless tobacco: Never   Substance Use Topics    Alcohol use: No     Alcohol/week: 0.0 standard drinks      Current Outpatient Medications   Medication Sig Dispense Refill    clonazePAM (KLONOPIN) 0.5 MG tablet Take 1 tablet by mouth 3 times daily. Max Daily Amount: 1.5 mg      amLODIPine (NORVASC) 5 MG tablet Take 1 tablet by mouth daily 90 tablet 3    lisinopril-hydroCHLOROthiazide (PRINZIDE;ZESTORETIC) 20-25 MG per tablet Take 1 tablet by mouth daily 90 tablet 3    gabapentin (NEURONTIN) 300 MG capsule TAKE 1 CAPSULE TWICE A  capsule 0    potassium chloride (KLOR-CON M20) 20 MEQ extended release tablet Take 1 tablet by mouth daily 90 tablet 3    PARoxetine (PAXIL CR) 25 MG extended release tablet Take 1 tablet by mouth every morning 90 tablet 3    Diroximel Fumarate 231 MG CPDR Take by mouth daily      Clobetasol Propionate 0.05 % SHAM Shampoo daily 118 mL 2    triamcinolone (ARISTOCORT) 0.5 % cream Apply topically 3 times daily as needed 15 g 2    dicyclomine (BENTYL) 20 MG tablet Take 1 tablet by mouth 4 times daily as needed (after loose stool) 60 tablet 5    omeprazole (PRILOSEC) 20 MG delayed release capsule TAKE 1 CAPSULE EVERY MORNING BEFORE BREAKFAST 90 capsule 3    topiramate (TOPAMAX) 25 MG tablet Take 1 tablet by mouth 2 times daily 180 tablet 1    Misc.  Devices MISC Provide handicap placard, expires 5 years from this date 2027  Medical conditions prevent the ability

## 2023-07-07 RX ORDER — GABAPENTIN 300 MG/1
CAPSULE ORAL
Qty: 180 CAPSULE | Refills: 3 | OUTPATIENT
Start: 2023-07-07

## 2023-09-01 ENCOUNTER — OFFICE VISIT (OUTPATIENT)
Dept: PRIMARY CARE CLINIC | Age: 68
End: 2023-09-01
Payer: MEDICARE

## 2023-09-01 VITALS
SYSTOLIC BLOOD PRESSURE: 122 MMHG | DIASTOLIC BLOOD PRESSURE: 68 MMHG | HEART RATE: 67 BPM | BODY MASS INDEX: 42.48 KG/M2 | WEIGHT: 225 LBS | OXYGEN SATURATION: 94 % | HEIGHT: 61 IN

## 2023-09-01 DIAGNOSIS — R10.13 DYSPEPSIA: ICD-10-CM

## 2023-09-01 DIAGNOSIS — F41.9 ANXIETY: Primary | ICD-10-CM

## 2023-09-01 DIAGNOSIS — R53.1 GENERALIZED WEAKNESS: ICD-10-CM

## 2023-09-01 DIAGNOSIS — I10 BENIGN ESSENTIAL HTN: ICD-10-CM

## 2023-09-01 DIAGNOSIS — E66.01 OBESITY, CLASS III, BMI 40-49.9 (MORBID OBESITY) (HCC): ICD-10-CM

## 2023-09-01 DIAGNOSIS — G35 MULTIPLE SCLEROSIS (HCC): ICD-10-CM

## 2023-09-01 PROCEDURE — 1123F ACP DISCUSS/DSCN MKR DOCD: CPT | Performed by: NURSE PRACTITIONER

## 2023-09-01 PROCEDURE — 99214 OFFICE O/P EST MOD 30 MIN: CPT | Performed by: NURSE PRACTITIONER

## 2023-09-01 PROCEDURE — 1036F TOBACCO NON-USER: CPT | Performed by: NURSE PRACTITIONER

## 2023-09-01 PROCEDURE — G8399 PT W/DXA RESULTS DOCUMENT: HCPCS | Performed by: NURSE PRACTITIONER

## 2023-09-01 PROCEDURE — G8417 CALC BMI ABV UP PARAM F/U: HCPCS | Performed by: NURSE PRACTITIONER

## 2023-09-01 PROCEDURE — 1090F PRES/ABSN URINE INCON ASSESS: CPT | Performed by: NURSE PRACTITIONER

## 2023-09-01 PROCEDURE — 3078F DIAST BP <80 MM HG: CPT | Performed by: NURSE PRACTITIONER

## 2023-09-01 PROCEDURE — G8427 DOCREV CUR MEDS BY ELIG CLIN: HCPCS | Performed by: NURSE PRACTITIONER

## 2023-09-01 PROCEDURE — 3074F SYST BP LT 130 MM HG: CPT | Performed by: NURSE PRACTITIONER

## 2023-09-01 PROCEDURE — 3017F COLORECTAL CA SCREEN DOC REV: CPT | Performed by: NURSE PRACTITIONER

## 2023-09-01 RX ORDER — LISINOPRIL AND HYDROCHLOROTHIAZIDE 25; 20 MG/1; MG/1
1 TABLET ORAL DAILY
Qty: 90 TABLET | Refills: 3 | Status: SHIPPED | OUTPATIENT
Start: 2023-09-01

## 2023-09-01 RX ORDER — AMLODIPINE BESYLATE 5 MG/1
5 TABLET ORAL DAILY
Qty: 90 TABLET | Refills: 3 | Status: SHIPPED | OUTPATIENT
Start: 2023-09-01

## 2023-09-01 RX ORDER — POTASSIUM CHLORIDE 20 MEQ/1
20 TABLET, EXTENDED RELEASE ORAL DAILY
Qty: 90 TABLET | Refills: 3 | Status: SHIPPED | OUTPATIENT
Start: 2023-09-01

## 2023-09-01 RX ORDER — OMEPRAZOLE 20 MG/1
20 CAPSULE, DELAYED RELEASE ORAL
Qty: 90 CAPSULE | Refills: 3 | Status: SHIPPED | OUTPATIENT
Start: 2023-09-01

## 2023-09-01 RX ORDER — PAROXETINE HYDROCHLORIDE HEMIHYDRATE 25 MG/1
25 TABLET, FILM COATED, EXTENDED RELEASE ORAL EVERY MORNING
Qty: 90 TABLET | Refills: 3 | Status: SHIPPED | OUTPATIENT
Start: 2023-09-01

## 2023-09-01 SDOH — ECONOMIC STABILITY: FOOD INSECURITY: WITHIN THE PAST 12 MONTHS, THE FOOD YOU BOUGHT JUST DIDN'T LAST AND YOU DIDN'T HAVE MONEY TO GET MORE.: NEVER TRUE

## 2023-09-01 SDOH — ECONOMIC STABILITY: FOOD INSECURITY: WITHIN THE PAST 12 MONTHS, YOU WORRIED THAT YOUR FOOD WOULD RUN OUT BEFORE YOU GOT MONEY TO BUY MORE.: NEVER TRUE

## 2023-09-01 SDOH — ECONOMIC STABILITY: INCOME INSECURITY: HOW HARD IS IT FOR YOU TO PAY FOR THE VERY BASICS LIKE FOOD, HOUSING, MEDICAL CARE, AND HEATING?: NOT HARD AT ALL

## 2023-09-01 ASSESSMENT — PATIENT HEALTH QUESTIONNAIRE - PHQ9
SUM OF ALL RESPONSES TO PHQ QUESTIONS 1-9: 0
2. FEELING DOWN, DEPRESSED OR HOPELESS: 0
SUM OF ALL RESPONSES TO PHQ9 QUESTIONS 1 & 2: 0
SUM OF ALL RESPONSES TO PHQ QUESTIONS 1-9: 0
1. LITTLE INTEREST OR PLEASURE IN DOING THINGS: 0
SUM OF ALL RESPONSES TO PHQ QUESTIONS 1-9: 0
SUM OF ALL RESPONSES TO PHQ QUESTIONS 1-9: 0

## 2023-09-01 ASSESSMENT — ENCOUNTER SYMPTOMS
CONSTIPATION: 0
ABDOMINAL PAIN: 0
SORE THROAT: 0
WHEEZING: 0
COUGH: 0
SHORTNESS OF BREATH: 0
DIARRHEA: 0
BLOOD IN STOOL: 0
NAUSEA: 0
VOMITING: 0
TROUBLE SWALLOWING: 0
SINUS PRESSURE: 0

## 2023-09-01 NOTE — PROGRESS NOTES
MG delayed release capsule      5. Multiple sclerosis [facility])  Radha De Jesus, Neurology, [facility]      6. Generalized weakness  [facility] Physical Therapy - [place]                Plan:      Return in about 3 months (around 12/11/2023) for as scheduled. Anxiety-has been well controlled many years on paroxetine. Discussed treatment alternatives if insurance ends up denying her continuation of this medication. Will complete prior Auth if necessary to allow patient to continue this particular drug. She has not had any panic attacks while taking it consistently  Hypertension-remains well controlled on amlodipine and ACE/HCTZ  Obesity-lengthy discussion, referral to physical therapy for strengthening and generalized exercise program development. Also reviewed need for diet management, discussed foods to avoid, avoid all soda, increase water intake. Offered referral to [facility] weight management but she prefers to try physical therapy/exercise first with diet changes that she can make on her own at home  Dyspepsia-well-controlled on daily PPI with avoidance of triggers  MS-referral provided to physicians assistant who she saw previously through Dr. Ag Cavazos office.   She will continue her current medications for now including Topamax, gabapentin and diroximel fumarate    Orders Placed This Encounter   Procedures    Radha De Jesus, Neurology, [address] [place] [address]     Referral Priority:   Routine     Referral Type:   Eval and Treat     Referral Reason:   Specialty Services Required     Referred to Provider:   MEME Harper     Requested Specialty:   Physician Assistant     Number of Visits Requested:   [address]     Referral Priority:   Routine     Referral Type:   Eval and Treat     Referral Reason:   Specialty Services Required     Requested Specialty:   Physical Therapist     Number of Visits Requested:   1        Orders Placed

## 2023-09-19 ENCOUNTER — TELEPHONE (OUTPATIENT)
Dept: PRIMARY CARE CLINIC | Age: 68
End: 2023-09-19

## 2023-09-19 DIAGNOSIS — F41.9 ANXIETY: Primary | ICD-10-CM

## 2023-09-19 RX ORDER — PAROXETINE 30 MG/1
30 TABLET, FILM COATED ORAL DAILY
Qty: 90 TABLET | Refills: 3 | Status: SHIPPED | OUTPATIENT
Start: 2023-09-19

## 2023-09-19 NOTE — TELEPHONE ENCOUNTER
Patient states that the Paxil XR is too expensive but the short release is covered.  They are asking to switch to short release    Please advise

## 2023-09-19 NOTE — TELEPHONE ENCOUNTER
Because this is an extended release formulation, the dosage will be slightly different. The new paroxetine dosage will be 30 mg daily  I have sent prescription to mail order pharmacy.   Thanks

## 2023-09-25 RX ORDER — MESALAMINE 1.2 G/1
2400 TABLET, DELAYED RELEASE ORAL
COMMUNITY

## 2023-09-27 ENCOUNTER — APPOINTMENT (OUTPATIENT)
Dept: GENERAL RADIOLOGY | Age: 68
End: 2023-09-27
Attending: UROLOGY
Payer: MEDICARE

## 2023-09-27 ENCOUNTER — ANESTHESIA (OUTPATIENT)
Dept: OPERATING ROOM | Age: 68
End: 2023-09-27
Payer: MEDICARE

## 2023-09-27 ENCOUNTER — HOSPITAL ENCOUNTER (OUTPATIENT)
Age: 68
Setting detail: OUTPATIENT SURGERY
Discharge: HOME | End: 2023-09-27
Attending: UROLOGY | Admitting: UROLOGY
Payer: MEDICARE

## 2023-09-27 ENCOUNTER — ANESTHESIA EVENT (OUTPATIENT)
Dept: OPERATING ROOM | Age: 68
End: 2023-09-27
Payer: MEDICARE

## 2023-09-27 VITALS
HEIGHT: 61 IN | TEMPERATURE: 97.2 F | DIASTOLIC BLOOD PRESSURE: 77 MMHG | RESPIRATION RATE: 16 BRPM | OXYGEN SATURATION: 92 % | HEART RATE: 67 BPM | BODY MASS INDEX: 42.48 KG/M2 | WEIGHT: 225 LBS | SYSTOLIC BLOOD PRESSURE: 116 MMHG

## 2023-09-27 DIAGNOSIS — G89.18 POST-OP PAIN: Primary | ICD-10-CM

## 2023-09-27 LAB
BUN BLD-MCNC: 19 MG/DL (ref 8–26)
BUN BLD-MCNC: 24 MG/DL (ref 8–26)
EGFR, POC: >60 ML/MIN/1.73M2
EGFR, POC: >60 ML/MIN/1.73M2
GLUCOSE BLD-MCNC: 100 MG/DL (ref 74–100)
GLUCOSE BLD-MCNC: 95 MG/DL (ref 74–100)
POC CREATININE: 0.7 MG/DL (ref 0.51–1.19)
POC CREATININE: 0.9 MG/DL (ref 0.51–1.19)
POTASSIUM BLD-SCNC: 3.9 MMOL/L (ref 3.5–4.5)
POTASSIUM BLD-SCNC: 7.6 MMOL/L (ref 3.5–4.5)

## 2023-09-27 PROCEDURE — 3600000003 HC SURGERY LEVEL 3 BASE: Performed by: UROLOGY

## 2023-09-27 PROCEDURE — C1787 PATIENT PROGR, NEUROSTIM: HCPCS | Performed by: UROLOGY

## 2023-09-27 PROCEDURE — 2500000003 HC RX 250 WO HCPCS: Performed by: STUDENT IN AN ORGANIZED HEALTH CARE EDUCATION/TRAINING PROGRAM

## 2023-09-27 PROCEDURE — C1767 GENERATOR, NEURO NON-RECHARG: HCPCS | Performed by: UROLOGY

## 2023-09-27 PROCEDURE — 6360000002 HC RX W HCPCS: Performed by: SPECIALIST

## 2023-09-27 PROCEDURE — 2500000003 HC RX 250 WO HCPCS: Performed by: UROLOGY

## 2023-09-27 PROCEDURE — 3700000001 HC ADD 15 MINUTES (ANESTHESIA): Performed by: UROLOGY

## 2023-09-27 PROCEDURE — C1897 LEAD, NEUROSTIM TEST KIT: HCPCS | Performed by: UROLOGY

## 2023-09-27 PROCEDURE — 3600000013 HC SURGERY LEVEL 3 ADDTL 15MIN: Performed by: UROLOGY

## 2023-09-27 PROCEDURE — 2580000003 HC RX 258: Performed by: STUDENT IN AN ORGANIZED HEALTH CARE EDUCATION/TRAINING PROGRAM

## 2023-09-27 PROCEDURE — 7100000001 HC PACU RECOVERY - ADDTL 15 MIN: Performed by: UROLOGY

## 2023-09-27 PROCEDURE — 6360000002 HC RX W HCPCS: Performed by: STUDENT IN AN ORGANIZED HEALTH CARE EDUCATION/TRAINING PROGRAM

## 2023-09-27 PROCEDURE — 3700000000 HC ANESTHESIA ATTENDED CARE: Performed by: UROLOGY

## 2023-09-27 PROCEDURE — 6360000002 HC RX W HCPCS: Performed by: PHYSICIAN ASSISTANT

## 2023-09-27 PROCEDURE — 7100000011 HC PHASE II RECOVERY - ADDTL 15 MIN: Performed by: UROLOGY

## 2023-09-27 PROCEDURE — C1778 LEAD, NEUROSTIMULATOR: HCPCS | Performed by: UROLOGY

## 2023-09-27 PROCEDURE — 7100000010 HC PHASE II RECOVERY - FIRST 15 MIN: Performed by: UROLOGY

## 2023-09-27 PROCEDURE — 7100000000 HC PACU RECOVERY - FIRST 15 MIN: Performed by: UROLOGY

## 2023-09-27 PROCEDURE — 84132 ASSAY OF SERUM POTASSIUM: CPT

## 2023-09-27 PROCEDURE — 2580000003 HC RX 258: Performed by: UROLOGY

## 2023-09-27 PROCEDURE — 82947 ASSAY GLUCOSE BLOOD QUANT: CPT

## 2023-09-27 PROCEDURE — 2709999900 HC NON-CHARGEABLE SUPPLY: Performed by: UROLOGY

## 2023-09-27 PROCEDURE — 2500000003 HC RX 250 WO HCPCS: Performed by: SPECIALIST

## 2023-09-27 PROCEDURE — 2580000003 HC RX 258: Performed by: SPECIALIST

## 2023-09-27 PROCEDURE — 84520 ASSAY OF UREA NITROGEN: CPT

## 2023-09-27 PROCEDURE — 82565 ASSAY OF CREATININE: CPT

## 2023-09-27 DEVICE — KIT LEAD SURE SCAN INTERSTIM MRI: Type: IMPLANTABLE DEVICE | Status: FUNCTIONAL

## 2023-09-27 DEVICE — NEUROSTIMULATOR INTERSTIM X RECHRGE FREE TORQ WRNCH PROD: Type: IMPLANTABLE DEVICE | Status: FUNCTIONAL

## 2023-09-27 RX ORDER — HYDROCODONE BITARTRATE AND ACETAMINOPHEN 5; 325 MG/1; MG/1
1 TABLET ORAL EVERY 6 HOURS PRN
Qty: 12 TABLET | Refills: 0 | Status: SHIPPED | OUTPATIENT
Start: 2023-09-27 | End: 2023-09-30

## 2023-09-27 RX ORDER — HYDRALAZINE HYDROCHLORIDE 20 MG/ML
10 INJECTION INTRAMUSCULAR; INTRAVENOUS
Status: DISCONTINUED | OUTPATIENT
Start: 2023-09-27 | End: 2023-09-27 | Stop reason: HOSPADM

## 2023-09-27 RX ORDER — LIDOCAINE HYDROCHLORIDE 10 MG/ML
INJECTION, SOLUTION EPIDURAL; INFILTRATION; INTRACAUDAL; PERINEURAL PRN
Status: DISCONTINUED | OUTPATIENT
Start: 2023-09-27 | End: 2023-09-27 | Stop reason: SDUPTHER

## 2023-09-27 RX ORDER — CEFADROXIL 500 MG/1
500 CAPSULE ORAL 2 TIMES DAILY
Qty: 6 CAPSULE | Refills: 0 | Status: SHIPPED | OUTPATIENT
Start: 2023-09-27 | End: 2023-09-27 | Stop reason: SDUPTHER

## 2023-09-27 RX ORDER — MAGNESIUM HYDROXIDE 1200 MG/15ML
LIQUID ORAL CONTINUOUS PRN
Status: DISCONTINUED | OUTPATIENT
Start: 2023-09-27 | End: 2023-09-27 | Stop reason: HOSPADM

## 2023-09-27 RX ORDER — PROPOFOL 10 MG/ML
INJECTION, EMULSION INTRAVENOUS PRN
Status: DISCONTINUED | OUTPATIENT
Start: 2023-09-27 | End: 2023-09-27 | Stop reason: SDUPTHER

## 2023-09-27 RX ORDER — HYDROCODONE BITARTRATE AND ACETAMINOPHEN 5; 325 MG/1; MG/1
1 TABLET ORAL EVERY 4 HOURS PRN
Qty: 18 TABLET | Refills: 0 | Status: SHIPPED | OUTPATIENT
Start: 2023-09-27 | End: 2023-09-27 | Stop reason: SDUPTHER

## 2023-09-27 RX ORDER — EPHEDRINE SULFATE/0.9% NACL/PF 50 MG/5 ML
SYRINGE (ML) INTRAVENOUS PRN
Status: DISCONTINUED | OUTPATIENT
Start: 2023-09-27 | End: 2023-09-27 | Stop reason: SDUPTHER

## 2023-09-27 RX ORDER — SODIUM CHLORIDE 0.9 % (FLUSH) 0.9 %
5-40 SYRINGE (ML) INJECTION EVERY 12 HOURS SCHEDULED
Status: DISCONTINUED | OUTPATIENT
Start: 2023-09-27 | End: 2023-09-27 | Stop reason: HOSPADM

## 2023-09-27 RX ORDER — SODIUM CHLORIDE 9 MG/ML
INJECTION, SOLUTION INTRAVENOUS PRN
Status: DISCONTINUED | OUTPATIENT
Start: 2023-09-27 | End: 2023-09-27 | Stop reason: HOSPADM

## 2023-09-27 RX ORDER — ONDANSETRON 2 MG/ML
4 INJECTION INTRAMUSCULAR; INTRAVENOUS
Status: DISCONTINUED | OUTPATIENT
Start: 2023-09-27 | End: 2023-09-27 | Stop reason: HOSPADM

## 2023-09-27 RX ORDER — SODIUM CHLORIDE, SODIUM LACTATE, POTASSIUM CHLORIDE, CALCIUM CHLORIDE 600; 310; 30; 20 MG/100ML; MG/100ML; MG/100ML; MG/100ML
INJECTION, SOLUTION INTRAVENOUS CONTINUOUS
Status: DISCONTINUED | OUTPATIENT
Start: 2023-09-27 | End: 2023-09-27 | Stop reason: HOSPADM

## 2023-09-27 RX ORDER — FENTANYL CITRATE 50 UG/ML
INJECTION, SOLUTION INTRAMUSCULAR; INTRAVENOUS PRN
Status: DISCONTINUED | OUTPATIENT
Start: 2023-09-27 | End: 2023-09-27 | Stop reason: SDUPTHER

## 2023-09-27 RX ORDER — PRIMIDONE 50 MG/1
50 TABLET ORAL 2 TIMES DAILY
COMMUNITY

## 2023-09-27 RX ORDER — ONDANSETRON 2 MG/ML
INJECTION INTRAMUSCULAR; INTRAVENOUS PRN
Status: DISCONTINUED | OUTPATIENT
Start: 2023-09-27 | End: 2023-09-27 | Stop reason: SDUPTHER

## 2023-09-27 RX ORDER — APREPITANT 40 MG/1
40 CAPSULE ORAL ONCE
Status: COMPLETED | OUTPATIENT
Start: 2023-09-27 | End: 2023-09-27

## 2023-09-27 RX ORDER — ROCURONIUM BROMIDE 10 MG/ML
INJECTION, SOLUTION INTRAVENOUS PRN
Status: DISCONTINUED | OUTPATIENT
Start: 2023-09-27 | End: 2023-09-27 | Stop reason: SDUPTHER

## 2023-09-27 RX ORDER — CEFADROXIL 500 MG/1
500 CAPSULE ORAL 2 TIMES DAILY
Qty: 10 CAPSULE | Refills: 0 | Status: SHIPPED | OUTPATIENT
Start: 2023-09-27 | End: 2023-10-02

## 2023-09-27 RX ORDER — SODIUM CHLORIDE 0.9 % (FLUSH) 0.9 %
5-40 SYRINGE (ML) INJECTION PRN
Status: DISCONTINUED | OUTPATIENT
Start: 2023-09-27 | End: 2023-09-27 | Stop reason: HOSPADM

## 2023-09-27 RX ORDER — DEXAMETHASONE SODIUM PHOSPHATE 10 MG/ML
INJECTION INTRAMUSCULAR; INTRAVENOUS PRN
Status: DISCONTINUED | OUTPATIENT
Start: 2023-09-27 | End: 2023-09-27 | Stop reason: SDUPTHER

## 2023-09-27 RX ORDER — BUPIVACAINE HYDROCHLORIDE AND EPINEPHRINE 2.5; 5 MG/ML; UG/ML
INJECTION, SOLUTION EPIDURAL; INFILTRATION; INTRACAUDAL; PERINEURAL PRN
Status: DISCONTINUED | OUTPATIENT
Start: 2023-09-27 | End: 2023-09-27 | Stop reason: ALTCHOICE

## 2023-09-27 RX ORDER — SODIUM CHLORIDE, SODIUM LACTATE, POTASSIUM CHLORIDE, CALCIUM CHLORIDE 600; 310; 30; 20 MG/100ML; MG/100ML; MG/100ML; MG/100ML
INJECTION, SOLUTION INTRAVENOUS CONTINUOUS PRN
Status: DISCONTINUED | OUTPATIENT
Start: 2023-09-27 | End: 2023-09-27 | Stop reason: SDUPTHER

## 2023-09-27 RX ADMIN — LIDOCAINE HYDROCHLORIDE 50 MG: 10 INJECTION, SOLUTION EPIDURAL; INFILTRATION; INTRACAUDAL; PERINEURAL at 12:06

## 2023-09-27 RX ADMIN — ONDANSETRON 4 MG: 2 INJECTION INTRAMUSCULAR; INTRAVENOUS at 13:04

## 2023-09-27 RX ADMIN — PROPOFOL 100 MG: 10 INJECTION, EMULSION INTRAVENOUS at 12:41

## 2023-09-27 RX ADMIN — Medication 10 MG: at 12:56

## 2023-09-27 RX ADMIN — Medication 2000 MG: at 12:22

## 2023-09-27 RX ADMIN — FENTANYL CITRATE 50 MCG: 50 INJECTION, SOLUTION INTRAMUSCULAR; INTRAVENOUS at 12:06

## 2023-09-27 RX ADMIN — PROPOFOL 200 MG: 10 INJECTION, EMULSION INTRAVENOUS at 12:06

## 2023-09-27 RX ADMIN — DEXAMETHASONE SODIUM PHOSPHATE 8 MG: 10 INJECTION INTRAMUSCULAR; INTRAVENOUS at 12:47

## 2023-09-27 RX ADMIN — ROCURONIUM BROMIDE 25 MG: 10 INJECTION, SOLUTION INTRAVENOUS at 12:06

## 2023-09-27 RX ADMIN — SODIUM CHLORIDE, POTASSIUM CHLORIDE, SODIUM LACTATE AND CALCIUM CHLORIDE: 600; 310; 30; 20 INJECTION, SOLUTION INTRAVENOUS at 11:43

## 2023-09-27 RX ADMIN — APREPITANT 40 MG: 40 CAPSULE ORAL at 11:49

## 2023-09-27 RX ADMIN — SODIUM CHLORIDE, POTASSIUM CHLORIDE, SODIUM LACTATE AND CALCIUM CHLORIDE: 600; 310; 30; 20 INJECTION, SOLUTION INTRAVENOUS at 12:00

## 2023-09-27 RX ADMIN — SUGAMMADEX 200 MG: 100 INJECTION, SOLUTION INTRAVENOUS at 12:38

## 2023-09-27 RX ADMIN — Medication 10 MG: at 12:46

## 2023-09-27 RX ADMIN — FENTANYL CITRATE 50 MCG: 50 INJECTION, SOLUTION INTRAMUSCULAR; INTRAVENOUS at 12:38

## 2023-09-27 RX ADMIN — HYDROMORPHONE HYDROCHLORIDE 0.5 MG: 1 INJECTION, SOLUTION INTRAMUSCULAR; INTRAVENOUS; SUBCUTANEOUS at 13:25

## 2023-09-27 ASSESSMENT — PAIN DESCRIPTION - DESCRIPTORS: DESCRIPTORS: BURNING

## 2023-09-27 ASSESSMENT — PAIN SCALES - GENERAL
PAINLEVEL_OUTOF10: 5
PAINLEVEL_OUTOF10: 7
PAINLEVEL_OUTOF10: 4
PAINLEVEL_OUTOF10: 6
PAINLEVEL_OUTOF10: 9
PAINLEVEL_OUTOF10: 9

## 2023-09-27 ASSESSMENT — PAIN DESCRIPTION - LOCATION
LOCATION: BACK
LOCATION: BACK

## 2023-09-27 ASSESSMENT — PAIN DESCRIPTION - ORIENTATION
ORIENTATION: LOWER
ORIENTATION: LOWER

## 2023-09-27 ASSESSMENT — PAIN - FUNCTIONAL ASSESSMENT: PAIN_FUNCTIONAL_ASSESSMENT: NONE - DENIES PAIN

## 2023-09-27 NOTE — OP NOTE
Operative Note      Patient: Sita Porras  YOB: 1955  MRN: [id number]    Date of Procedure: 9/27/2023    Pre-Op Diagnosis Codes:     * Urge incontinence [N39.41]    Post-Op Diagnosis: Same       Procedure(s):  INTERSTIM IMPLANT STAGE 1 AND 2    Surgeon(s):  Willie Nance MD    Assistant:   Resident: Treva Wang MD    Anesthesia: Monitor Anesthesia Care    Estimated Blood Loss (mL): Minimal    Complications: None    Specimens:   * No specimens in log *    Implants:  Implant Name Type Inv. Item Serial No.  Lot No. LRB No. Used Action   KIT LEAD SURE SCAN INTERSTIM MRI - [id number] Spine:Stimulator KIT LEAD SURE SCAN INTERSTIM MRI  [id number] N/A 1 Implanted   NEUROSTIMULATOR INTERSTIM X RECHRGE FREE TOR[facility]  PROD - [id number]  NEUROSTIMULATOR INTERSTIM X RECHRGE FREE [facility]  PROD [id number] [address]  N/A 1 Implanted         Drains: * No LDAs found *    Findings: Interstim device placed into patient's right buttock. Neurostimulator leads placed within the right sacrum at S3 with good paola and toe curl. We were able to obtain good response with low amplitude multiple leads. Indication:  61-year-old female presents with urge incontinence and MS history, has tried oral medications with no improvement. She did have good response to neurostimulator in the office, presenting today for InterStim stage I and II. After risks, benefits, alternatives were discussed, patient agreed to move over the procedure listed above. NARRATIVE SUMMARY OF THE PROCEDURE:  The patient was brought to the OR. The patient was placed in prone position. A pillow was placed underneath her pelvis area to slightly lift the pelvis up. The patient was awake, was given some general anesthesia. The patient's back was prepped and draped in the usual sterile fashion. We used fluoroscopy to measure our sacral landmarks.  Lidocaine 1% was applied on the right side near the S3 foramen. Under fluoroscopy, the needle placement was confirmed. We tested the placement of needle and noted the patient did have plantar flexion of the great toe and paola response indicating the proper positioning of the needle. A wire was placed. The tract was dilated and lead was placed. We tested the lead and noted the proper responses as before indicating the lead is in its proper position. Lead was tunneled under the skin and was brought out through an incision on the right upper buttocks. A pouch was created about 1 cm beneath the subcutaneous tissue over the muscle where the lead was connected to the generator. Screws were tightened with a hex wrench. Attention was made to ensure that the lead was all the way in into the InterStim. The subdermal tissue was closed using 3-0 Vicryl in running fashion and the skin with 4-0 Monocryl. Dermabond was applied. We injected 0.25% marcaine, 6cc just superficially. The patient was brought to recovery in a stable condition. Please note that Dr. Guerita Stern was present for all critical portions of the procedure. Plan:  Discharge home in stable condition, follow up in 2-4 weeks for wound check.     Veena Scott MD  Urology Resident, PGY-3

## 2023-09-27 NOTE — H&P
Pre-op History and Physical  Grisel Claudio PA-C    Patient:  Ritika Jefferson  MRN: [id number]  YOB: 1955    HISTORY OF PRESENT ILLNESS:     The patient is a 74 y.o. female who presents with urge incontinence with MS history. Has done oral medicaitons and Botox injections with little improvement of symptoms. Here for Interstim Stage 1 and 2. Patient's old records, notes and chart reviewed and summarized above. Grisel Claudio PA-C independently reviewed the images and verified the radiology reports from:    No results found. Past Medical History:    Past Medical History:   Diagnosis Date    Anxiety     Crohn's disease ([address])     Dr. Natalie Romero last visit 2023    Diverticulosis     Fatigue     Hepatitis     pt denies    Hypertension     Impaired functional mobility, balance, gait, and endurance     Morbid obesity ([address])     Multiple sclerosis ([address])     Dr. Velma Yip Neurology [facility]  10/5/2023    Osteopenia     Psoriasis     Under care of team     Dominic Acuña    Urge incontinence     Dr. Waterman [facility]    Well adult exam     Mortimer Terence CORLEY Hudson Roque last appt 2023       Past Surgical History:    Past Surgical History:   Procedure Laterality Date    ANKLE FRACTURE SURGERY  2021    left    APPENDECTOMY       SECTION      CHOLECYSTECTOMY      COLONOSCOPY  2013, 2015    CYST REMOVAL      FOOT SURGERY  2012    right foot bunionectomy    TONSILLECTOMY AND ADENOIDECTOMY      WRIST FRACTURE SURGERY         Medications Prior to Admission:    Prior to Admission medications    Medication Sig Start Date End Date Taking?  Authorizing Provider   mesalamine (LIALDA) 1.2 g EC tablet Take 2 tablets by mouth daily (with breakfast)   Yes Historical Provider, MD   PARoxetine (PAXIL) 30 MG tablet Take 1 tablet by mouth daily 23   SJ Chang - CNP   amLODIPine (NORVASC) 5 MG tablet Take 1 tablet by mouth daily \"PHOS\", \"BUN\", \"CREATININE\", \"CA\" in the last 72 hours. No results found for: \"PSA\"      Urinalysis: No results for input(s): \"COLORU\", \"PHUR\", \"LABCAST\", \"WBCUA\", \"RBCUA\", \"MUCUS\", \"TRICHOMONAS\", \"YEAST\", \"BACTERIA\", \"CLARITYU\", \"SPECGRAV\", \"LEUKOCYTESUR\", \"UROBILINOGEN\", \"BILIRUBINUR\", \"BLOODU\" in the last 72 hours. Invalid input(s): \"NITRATE\", \"GLUCOSEUKETONESUAMORPHOUS\"     -----------------------------------------------------------------    ASSESSMENT AND PLAN:    Impression:    MS with voiding dysfunction  Urge incontinence    Patient Active Problem List   Diagnosis    Carpal tunnel syndrome    Multiple sclerosis (HCC)    Hepatitis    Osteopenia    Dyslipidemia    Anxiety    Crohn disease ([address])    Benign essential HTN       Plan: Interstim implant stage 1 and 2 in OR today.     Consent obtained      Flip Vega PA-C  10:47 AM 9/27/2023

## 2023-09-27 NOTE — DISCHARGE INSTRUCTIONS
Discharge instructions: Interstim  No soaking in water (including baths or pools) for 2 weeks until incision healed     Patient ok to discharge home in good condition  No heavy lifting, >10 lbs for today  Patient should avoid strenuous activity for today  Patient should walk moderately at home  Patient ok to shower tomorrow. Avoid soaking in water (such as bathtub) for at least 2 weeks. Patient may resume diet as tolerated  Patient should take prescriptions as directed  No driving while on narcotics  Please call attending physician or hospital  with questions  Call or Present to ED if fever (> 101F), intractable nausea vomiting or pain. Patient should follow up with Dr. Isadora Pedroza, in 6 weeks. Call to confirm appointment. No alcoholic beverages, no driving or operating machinery, no making important decisions for 24 hours. You may have a normal diet but should eat lightly day of surgery. Drink plenty of fluids.   Urinate within 8 hours after surgery, if unable to urinate call your doctor

## 2023-10-05 ENCOUNTER — OFFICE VISIT (OUTPATIENT)
Dept: NEUROLOGY | Age: 68
End: 2023-10-05

## 2023-10-05 VITALS
DIASTOLIC BLOOD PRESSURE: 78 MMHG | HEIGHT: 61 IN | HEART RATE: 64 BPM | SYSTOLIC BLOOD PRESSURE: 124 MMHG | BODY MASS INDEX: 42.51 KG/M2

## 2023-10-05 DIAGNOSIS — G35 MULTIPLE SCLEROSIS (HCC): ICD-10-CM

## 2023-10-05 DIAGNOSIS — R25.1 TREMOR: Primary | ICD-10-CM

## 2023-10-05 DIAGNOSIS — Z91.89 AT RISK FOR SIDE EFFECT OF MEDICATION: ICD-10-CM

## 2023-10-05 RX ORDER — TOPIRAMATE 25 MG/1
TABLET ORAL
Qty: 720 TABLET | Refills: 1 | Status: SHIPPED | OUTPATIENT
Start: 2023-10-05 | End: 2024-04-16

## 2023-10-05 NOTE — PROGRESS NOTES
relapsing multiple sclerosis. Nancy Garcia has a history, as noted above, remarkable for  multiple discrete episodes of neurological dysfunction localizing to the CNS. Her exam was remarkable for mild weakness on the left side, loss of vibratory sensation in the lower extremities, and action tremor affecting the left > right side with head tremor consistent with action tremor. Unfortunately, action tremor has been somewhat difficult to control with medication and did not respond well to primidone. .  Imaging, as detailed above, revealed lesions in the brain and  spinal cord consistent with multiple sclerosis. Remote work-up and CSF studies were not fully available for my review. Overall, her presentation is consistent with relapsing multiple sclerosis with a not fully clear more recent disease course. She is doing well on diroximel fumarate currently despite comorbid Crohn's disease. I agree with the excellent care provided to date by Dr. Sydna Nageotte and recommend the following plan:     PLAN:      I recommend continuing diroximel fumarate for now. I did discuss DISCO-MS data with the patient and her , but I am very hesitant to stop a well-tolerated DMT without reason. We reviewed the risks and common side effects of this medication today. 2.  While on DRF, she needs CBCD, CMP at least every 6 months. This medication should be stopped if she develops persistent lymphopenia. 2.  MRI brain due for radiographic disease monitoring is due in 12/2023. We will plan for a MRI brain w/wo andrzej in this case, given a desire to monitor her meningioma as well. ), IgG index, cytology with flow. 4.    The patient is currently taking vitamin D3 2000 IU daily. Increase to 4000 IU daily and I will recheck at next visit with a goal level of  ng/mL. 5.  I discussed the pathophysiology, natural history, disease course, and prognosis of multiple sclerosis.   I discussed the realistic goals of

## 2023-10-05 NOTE — PATIENT INSTRUCTIONS
Increase topiramate (Topamax) as follows:                     AM                              PM  Week 1:          50 mg (2 tabs)            75 mg (3 tabs)     Week 2:          75 mg (3 tabs)            75 mg (3 tabs)           Week 3:          75 mg (3 tabs)             100 mg (4 tabs)        Week 4:         100 mg (4 tabs) 100 mg (4 tabs)

## 2023-10-06 ASSESSMENT — ENCOUNTER SYMPTOMS
COUGH: 0
EYE DISCHARGE: 0
CONSTIPATION: 0
SORE THROAT: 0
VOMITING: 0
SHORTNESS OF BREATH: 0
COLOR CHANGE: 0
NAUSEA: 0
DIARRHEA: 0
WHEEZING: 0
TROUBLE SWALLOWING: 0
EYE ITCHING: 0
EYE PAIN: 0
CHEST TIGHTNESS: 0

## 2023-10-10 DIAGNOSIS — G25.2 RUBRAL TREMOR: Primary | ICD-10-CM

## 2023-10-10 NOTE — TELEPHONE ENCOUNTER
Pt's  called in asking if we can send pt's Gabapentin to [facility]? Dr Bud Lowery prescribed it last and [facility]. Please advise, thanks.

## 2023-10-11 DIAGNOSIS — Z91.89 AT RISK FOR SIDE EFFECT OF MEDICATION: ICD-10-CM

## 2023-10-11 DIAGNOSIS — D72.810 LYMPHOPENIA: Primary | ICD-10-CM

## 2023-10-11 DIAGNOSIS — G35 MULTIPLE SCLEROSIS (HCC): ICD-10-CM

## 2023-10-11 RX ORDER — GABAPENTIN 300 MG/1
CAPSULE ORAL
Qty: 180 CAPSULE | Refills: 1 | Status: SHIPPED | OUTPATIENT
Start: 2023-10-11 | End: 2024-04-08

## 2023-10-11 NOTE — PROGRESS NOTES
Lymphocyte counts slightly low (0.7). This can be a side effect of Vumerity and can increase the risk for infections. I want to re-check labs in 3 months. The orders were placed.

## 2023-11-15 ENCOUNTER — HOSPITAL ENCOUNTER (OUTPATIENT)
Dept: MRI IMAGING | Age: 68
Discharge: HOME | End: 2023-11-17
Attending: PSYCHIATRY & NEUROLOGY
Payer: MEDICARE

## 2023-11-15 DIAGNOSIS — G35 MULTIPLE SCLEROSIS (HCC): ICD-10-CM

## 2023-11-15 LAB
CREAT SERPL-MCNC: 0.8 MG/DL (ref 0.5–0.9)
GFR SERPL CREATININE-BSD FRML MDRD: >60 ML/MIN/1.73M2

## 2023-11-15 PROCEDURE — 6360000004 HC RX CONTRAST MEDICATION: Performed by: PSYCHIATRY & NEUROLOGY

## 2023-11-15 PROCEDURE — A9579 GAD-BASE MR CONTRAST NOS,1ML: HCPCS | Performed by: PSYCHIATRY & NEUROLOGY

## 2023-11-15 PROCEDURE — 70553 MRI BRAIN STEM W/O & W/DYE: CPT

## 2023-11-15 PROCEDURE — 82565 ASSAY OF CREATININE: CPT

## 2023-11-15 PROCEDURE — 36415 COLL VENOUS BLD VENIPUNCTURE: CPT

## 2023-11-15 PROCEDURE — 2580000003 HC RX 258: Performed by: PSYCHIATRY & NEUROLOGY

## 2023-11-15 RX ORDER — SODIUM CHLORIDE 0.9 % (FLUSH) 0.9 %
10 SYRINGE (ML) INJECTION ONCE
Status: COMPLETED | OUTPATIENT
Start: 2023-11-15 | End: 2023-11-15

## 2023-11-15 RX ADMIN — SODIUM CHLORIDE, PRESERVATIVE FREE 10 ML: 5 INJECTION INTRAVENOUS at 09:19

## 2023-11-15 RX ADMIN — GADOTERIDOL 20 ML: 279.3 INJECTION, SOLUTION INTRAVENOUS at 09:19

## 2023-12-07 NOTE — TELEPHONE ENCOUNTER
Pharmacy requesting refill of Vumerity 231 mg.      Medication active on med list yes      Date of last Rx: Filled by previous provider outside of [facility]         verified by YOKO TALAVERA      Date of last appointment 10/5/2023    Next Visit Date:  12/14/2023

## 2023-12-13 ENCOUNTER — OFFICE VISIT (OUTPATIENT)
Dept: PRIMARY CARE CLINIC | Age: 68
End: 2023-12-13
Payer: MEDICARE

## 2023-12-13 VITALS
SYSTOLIC BLOOD PRESSURE: 120 MMHG | DIASTOLIC BLOOD PRESSURE: 72 MMHG | WEIGHT: 225 LBS | HEIGHT: 61 IN | OXYGEN SATURATION: 98 % | HEART RATE: 68 BPM | BODY MASS INDEX: 42.48 KG/M2

## 2023-12-13 DIAGNOSIS — K50.90 CROHN'S DISEASE WITHOUT COMPLICATION, UNSPECIFIED GASTROINTESTINAL TRACT LOCATION (HCC): ICD-10-CM

## 2023-12-13 DIAGNOSIS — Z12.31 SCREENING MAMMOGRAM FOR BREAST CANCER: ICD-10-CM

## 2023-12-13 DIAGNOSIS — Z78.0 POST-MENOPAUSAL: ICD-10-CM

## 2023-12-13 DIAGNOSIS — G35 MULTIPLE SCLEROSIS (HCC): ICD-10-CM

## 2023-12-13 DIAGNOSIS — I10 BENIGN ESSENTIAL HTN: Primary | ICD-10-CM

## 2023-12-13 DIAGNOSIS — F41.9 ANXIETY: ICD-10-CM

## 2023-12-13 DIAGNOSIS — R92.8 ABNORMAL MAMMOGRAM OF LEFT BREAST: ICD-10-CM

## 2023-12-13 PROCEDURE — 3078F DIAST BP <80 MM HG: CPT | Performed by: NURSE PRACTITIONER

## 2023-12-13 PROCEDURE — G8417 CALC BMI ABV UP PARAM F/U: HCPCS | Performed by: NURSE PRACTITIONER

## 2023-12-13 PROCEDURE — 1036F TOBACCO NON-USER: CPT | Performed by: NURSE PRACTITIONER

## 2023-12-13 PROCEDURE — G8427 DOCREV CUR MEDS BY ELIG CLIN: HCPCS | Performed by: NURSE PRACTITIONER

## 2023-12-13 PROCEDURE — 3074F SYST BP LT 130 MM HG: CPT | Performed by: NURSE PRACTITIONER

## 2023-12-13 PROCEDURE — 99214 OFFICE O/P EST MOD 30 MIN: CPT | Performed by: NURSE PRACTITIONER

## 2023-12-13 PROCEDURE — 1090F PRES/ABSN URINE INCON ASSESS: CPT | Performed by: NURSE PRACTITIONER

## 2023-12-13 PROCEDURE — G8399 PT W/DXA RESULTS DOCUMENT: HCPCS | Performed by: NURSE PRACTITIONER

## 2023-12-13 PROCEDURE — 3017F COLORECTAL CA SCREEN DOC REV: CPT | Performed by: NURSE PRACTITIONER

## 2023-12-13 PROCEDURE — G8484 FLU IMMUNIZE NO ADMIN: HCPCS | Performed by: NURSE PRACTITIONER

## 2023-12-13 PROCEDURE — 1123F ACP DISCUSS/DSCN MKR DOCD: CPT | Performed by: NURSE PRACTITIONER

## 2023-12-13 RX ORDER — ALPRAZOLAM 0.25 MG/1
0.25 TABLET ORAL DAILY PRN
Qty: 30 TABLET | Refills: 0 | Status: SHIPPED | OUTPATIENT
Start: 2023-12-13 | End: 2024-01-12

## 2023-12-13 SDOH — ECONOMIC STABILITY: FOOD INSECURITY: WITHIN THE PAST 12 MONTHS, THE FOOD YOU BOUGHT JUST DIDN'T LAST AND YOU DIDN'T HAVE MONEY TO GET MORE.: NEVER TRUE

## 2023-12-13 SDOH — ECONOMIC STABILITY: INCOME INSECURITY: HOW HARD IS IT FOR YOU TO PAY FOR THE VERY BASICS LIKE FOOD, HOUSING, MEDICAL CARE, AND HEATING?: NOT HARD AT ALL

## 2023-12-13 SDOH — ECONOMIC STABILITY: FOOD INSECURITY: WITHIN THE PAST 12 MONTHS, YOU WORRIED THAT YOUR FOOD WOULD RUN OUT BEFORE YOU GOT MONEY TO BUY MORE.: NEVER TRUE

## 2023-12-13 ASSESSMENT — ENCOUNTER SYMPTOMS
ABDOMINAL PAIN: 0
SORE THROAT: 0
VOMITING: 0
BLOOD IN STOOL: 0
COUGH: 0
DIARRHEA: 0
TROUBLE SWALLOWING: 0
NAUSEA: 0
CONSTIPATION: 0
SINUS PRESSURE: 0
SHORTNESS OF BREATH: 0
WHEEZING: 0

## 2023-12-13 ASSESSMENT — PATIENT HEALTH QUESTIONNAIRE - PHQ9
SUM OF ALL RESPONSES TO PHQ QUESTIONS 1-9: 0
1. LITTLE INTEREST OR PLEASURE IN DOING THINGS: 0
SUM OF ALL RESPONSES TO PHQ QUESTIONS 1-9: 0
SUM OF ALL RESPONSES TO PHQ9 QUESTIONS 1 & 2: 0
2. FEELING DOWN, DEPRESSED OR HOPELESS: 0
SUM OF ALL RESPONSES TO PHQ QUESTIONS 1-9: 0
SUM OF ALL RESPONSES TO PHQ QUESTIONS 1-9: 0

## 2023-12-13 NOTE — PROGRESS NOTES
[facility] NEUROLOGY SPECIALIST  [address]  Dept: [phone]    PATIENT NAME: Samantha Pedroza  PATIENT MRN: [id number]  PRIMARY CARE PHYSICIAN: SJ Bishop - CNP    History     Samantha Pedroza is a 74 y.o. female who I initially saw in consultation on 10/5/2023. Her history is summarized as follows:     Samantha Pedroza is a 74 y.o. female with a history of Crohn's disease who presents to clinic today for evaluation of multiple sclerosis. She was 24years old. She became numb on the left side including the lower face, arm, and leg. This gradually improved over time. This was favored to be vascular in origin. She had a 98% improvement to baseline over 6-8 weeks. About 10 years later, she had a tingling sensation in the midsection. This lasted about 6 weeks. About 7 years later, she had blurry vision OU. She was seen by optometrist and sent for further evaluation for multiple sclerosis. She believes she has CSF studies in the past and underwent an extensive work-up at this time. Vision improved over 6 weeks. She was ultimately diagnosed with RRMS and started on Avonex. She moved several times including a move from [place] and later to [place], [place]. She was switched from Avonex to dimethyl fumarate in the past due to preference for an oral medication. She was later switched to diroximel fumarate (Vumerity), presumably due to cost/insurance reasons. She reports that she has been tolerating this medication well. She denied significant diarrhea but also has a history of Crohn's disease. Ms. Tess Angelo has a history of essential tremor, which has been difficult to control and refractory to several treatments. She recalls that this began 10 years ago and is getting worse over time. It is worse on the left and she also has a head tremor.   She feels that she has some benefit from primidone 50 mg PO BID, but has been on

## 2023-12-14 ENCOUNTER — OFFICE VISIT (OUTPATIENT)
Dept: NEUROLOGY | Age: 68
End: 2023-12-14
Payer: MEDICARE

## 2023-12-14 VITALS
DIASTOLIC BLOOD PRESSURE: 72 MMHG | WEIGHT: 225 LBS | BODY MASS INDEX: 42.48 KG/M2 | SYSTOLIC BLOOD PRESSURE: 124 MMHG | HEIGHT: 61 IN | HEART RATE: 59 BPM

## 2023-12-14 DIAGNOSIS — Z91.89 AT RISK FOR SIDE EFFECT OF MEDICATION: ICD-10-CM

## 2023-12-14 DIAGNOSIS — G25.2 RUBRAL TREMOR: ICD-10-CM

## 2023-12-14 DIAGNOSIS — R25.1 TREMOR: ICD-10-CM

## 2023-12-14 DIAGNOSIS — G35 MULTIPLE SCLEROSIS (HCC): Primary | ICD-10-CM

## 2023-12-14 PROCEDURE — 1090F PRES/ABSN URINE INCON ASSESS: CPT | Performed by: PSYCHIATRY & NEUROLOGY

## 2023-12-14 PROCEDURE — 3078F DIAST BP <80 MM HG: CPT | Performed by: PSYCHIATRY & NEUROLOGY

## 2023-12-14 PROCEDURE — G8484 FLU IMMUNIZE NO ADMIN: HCPCS | Performed by: PSYCHIATRY & NEUROLOGY

## 2023-12-14 PROCEDURE — 3017F COLORECTAL CA SCREEN DOC REV: CPT | Performed by: PSYCHIATRY & NEUROLOGY

## 2023-12-14 PROCEDURE — G8427 DOCREV CUR MEDS BY ELIG CLIN: HCPCS | Performed by: PSYCHIATRY & NEUROLOGY

## 2023-12-14 PROCEDURE — 99214 OFFICE O/P EST MOD 30 MIN: CPT | Performed by: PSYCHIATRY & NEUROLOGY

## 2023-12-14 PROCEDURE — 93000 ELECTROCARDIOGRAM COMPLETE: CPT | Performed by: PSYCHIATRY & NEUROLOGY

## 2023-12-14 PROCEDURE — 1036F TOBACCO NON-USER: CPT | Performed by: PSYCHIATRY & NEUROLOGY

## 2023-12-14 PROCEDURE — 1123F ACP DISCUSS/DSCN MKR DOCD: CPT | Performed by: PSYCHIATRY & NEUROLOGY

## 2023-12-14 PROCEDURE — G8417 CALC BMI ABV UP PARAM F/U: HCPCS | Performed by: PSYCHIATRY & NEUROLOGY

## 2023-12-14 PROCEDURE — G8399 PT W/DXA RESULTS DOCUMENT: HCPCS | Performed by: PSYCHIATRY & NEUROLOGY

## 2023-12-14 PROCEDURE — 3074F SYST BP LT 130 MM HG: CPT | Performed by: PSYCHIATRY & NEUROLOGY

## 2023-12-14 RX ORDER — PROPRANOLOL HYDROCHLORIDE 10 MG/1
10 TABLET ORAL 2 TIMES DAILY
Qty: 60 TABLET | Refills: 5 | Status: SHIPPED | OUTPATIENT
Start: 2023-12-14

## 2023-12-14 NOTE — PATIENT INSTRUCTIONS
- Wean off Topamax (topiramate) as follows: AM                                    PM  Week 1:          50 mg (2 tabs)            50 mg (2 tabs)                                     Week 2:         25 mg (1 tabs)            50 mg (2 tabs)           Week 3:        25 mg (1 tabs)             25 mg (1 tabs)        Week 4:            -    25 mg (1 tabs)   Week 5       OFF     Continue primidone 50 mg BID for now. We may consider weaning this medication going forward to see if it is having a benefit for her. We are going to try propranolol 10 mg BID. I will check an EKG prior to starting.

## 2023-12-19 ENCOUNTER — HOSPITAL ENCOUNTER (EMERGENCY)
Age: 68
Discharge: HOME | End: 2023-12-19
Attending: EMERGENCY MEDICINE
Payer: MEDICARE

## 2023-12-19 ENCOUNTER — APPOINTMENT (OUTPATIENT)
Dept: GENERAL RADIOLOGY | Age: 68
End: 2023-12-19
Payer: MEDICARE

## 2023-12-19 VITALS
SYSTOLIC BLOOD PRESSURE: 145 MMHG | RESPIRATION RATE: 18 BRPM | DIASTOLIC BLOOD PRESSURE: 87 MMHG | OXYGEN SATURATION: 96 % | HEART RATE: 55 BPM | TEMPERATURE: 97.9 F

## 2023-12-19 DIAGNOSIS — M25.572 ACUTE LEFT ANKLE PAIN: Primary | ICD-10-CM

## 2023-12-19 PROCEDURE — 99284 EMERGENCY DEPT VISIT MOD MDM: CPT

## 2023-12-19 PROCEDURE — 73630 X-RAY EXAM OF FOOT: CPT

## 2023-12-19 PROCEDURE — 93005 ELECTROCARDIOGRAM TRACING: CPT

## 2023-12-19 PROCEDURE — 6360000002 HC RX W HCPCS: Performed by: EMERGENCY MEDICINE

## 2023-12-19 PROCEDURE — 96372 THER/PROPH/DIAG INJ SC/IM: CPT

## 2023-12-19 PROCEDURE — 73610 X-RAY EXAM OF ANKLE: CPT

## 2023-12-19 RX ORDER — KETOROLAC TROMETHAMINE 15 MG/ML
15 INJECTION, SOLUTION INTRAMUSCULAR; INTRAVENOUS ONCE
Status: DISCONTINUED | OUTPATIENT
Start: 2023-12-19 | End: 2023-12-19

## 2023-12-19 RX ORDER — KETOROLAC TROMETHAMINE 30 MG/ML
30 INJECTION, SOLUTION INTRAMUSCULAR; INTRAVENOUS ONCE
Status: COMPLETED | OUTPATIENT
Start: 2023-12-19 | End: 2023-12-19

## 2023-12-19 RX ADMIN — KETOROLAC TROMETHAMINE 30 MG: 30 INJECTION, SOLUTION INTRAMUSCULAR; INTRAVENOUS at 11:10

## 2023-12-19 ASSESSMENT — PAIN DESCRIPTION - LOCATION: LOCATION: ANKLE

## 2023-12-19 ASSESSMENT — PAIN DESCRIPTION - ORIENTATION: ORIENTATION: LEFT

## 2023-12-19 ASSESSMENT — PAIN SCALES - GENERAL: PAINLEVEL_OUTOF10: 5

## 2023-12-19 NOTE — ED PROVIDER NOTES
[facility] Emergency Department  [address]  [facility] [address]  Phone: [phone]  Fax: [phone]        Pt Name: Ana Kang  MRN: [id number]  Birthdate 1955  Date of evaluation: 12/19/23      CHIEF COMPLAINT     Chief Complaint   Patient presents with    Ankle Pain     States she rolled her ankle on Saturday, can only bear minimal weight on it since. States she previously broke same ankle 3 yrs ago         HISTORY OF PRESENT ILLNESS    Ana Kang is a 67 y.o. female who presents to our Emergency Department.    Patient presents emerged part complaining of ankle pain.  States that she twisted on Saturday and since then she has had some left-sided ankle pain.  States that she did not fall to the ground.  Does not have any knee pain.  States that she has a history of MS and twisted it when she was trying to get ready.  Patient denies any fevers or chills.  No chest pain or shortness of breath.  No numbness or tingling.  Has history of surgery on this ankle before in the past.  States that there is some swelling overlying the area on the lateral side as well as well as some pain on the foot.          REVIEW OF SYSTEMS       Review of Systems   Constitutional:  Negative for chills, diaphoresis and fever.   HENT:  Negative for drooling.    Eyes:  Negative for redness.   Respiratory:  Negative for cough, chest tightness and shortness of breath.    Cardiovascular:  Negative for chest pain and palpitations.   Gastrointestinal:  Negative for abdominal pain, constipation, diarrhea, nausea and vomiting.   Genitourinary:  Negative for dysuria and hematuria.   Musculoskeletal:  Positive for arthralgias. Negative for neck stiffness.   Skin:  Negative for rash.   Neurological:  Negative for weakness, numbness and headaches.   Psychiatric/Behavioral:  Negative for agitation.        PAST MEDICAL HISTORY     Past Medical History:   Diagnosis Date    Anxiety     Crohn's  murmur heard. Pulmonary:      Effort: Pulmonary effort is normal. No respiratory distress. Breath sounds: No stridor. Abdominal:      General: Abdomen is flat. There is no distension. Musculoskeletal:      Cervical back: Normal range of motion and neck supple. Right lower leg: No edema. Left lower leg: No edema. Comments: Patient does not have any pain over the left knee or the left hip. Able to bend the knee and flex the hip without difficulty. Pulses intact distally. Swelling over the lateral aspect of the left ankle. No bruising noted. No pain over fibular head. Patient pulses intact distally. Has pain over the fifth metatarsal base   Skin:     Capillary Refill: Capillary refill takes less than 2 seconds. Neurological:      Mental Status: She is alert and oriented to person, place, and time. Sensory: No sensory deficit. Psychiatric:         Mood and Affect: Mood normal.         Behavior: Behavior normal.         DIAGNOSTIC RESULTS     EKG: All EKG's are interpreted by the Emergency Department Physician who either signs or Co-signs this chart in the absence of a cardiologist.  Please see dictation of EKG in ED Course    RADIOLOGY:  The following imaging tests were ordered, reviewed by me, and interpreted by Radiology    XR FOOT LEFT (MIN 3 VIEWS)    Result Date: 12/19/2023  EXAMINATION: THREE XRAY VIEWS OF THE LEFT FOOT 12/19/2023 10:59 am COMPARISON: None. HISTORY: ORDERING SYSTEM PROVIDED HISTORY: pain over 5th metatarsal. TECHNOLOGIST PROVIDED HISTORY: pain over 5th metatarsal. Reason for Exam: left foot pain lateral and anterior, twisted and fell onto, hx surgery with hardware FINDINGS: Generalized osteopenia. Hallux valgus deformity. Old fracture 1st metatarsal with 2 screws in place. Screws and plate in distal fibula. Plantar calcaneal spur. No evidence for acute fracture. Soft tissues grossly intact. Osteopenia. No acute fracture or dislocation.      XR

## 2023-12-19 NOTE — DISCHARGE INSTRUCTIONS
If you had imaging today, your results are:  XR FOOT LEFT (MIN 3 VIEWS)   Final Result   Osteopenia. No acute fracture or dislocation. XR ANKLE LEFT (MIN 3 VIEWS)   Final Result   Soft tissue swelling without acute osseous abnormality. Take your medication as indicated and prescribed. If you are given an antibiotic then, make sure you get the prescription filled and take the antibiotics until finished. PLEASE RETURN TO THE EMERGENCY DEPARTMENT IMMEDIATELY if your symptoms worsen in anyway or in 8-12 hours if not improved for re-evaluation. You should immediately return to the ER for symptoms such as increasing pain, bloody stool, fever, a feeling of passing out, light headed, dizziness, chest pain, shortness of breath, persistent nausea and/or vomiting, numbness or weakness to the arms or legs, coolness or color change of the arms or legs. Please understand that at this time there is no evidence for a more serious underlying process, but that early in the process of an illness or injury, an emergency department workup can be falsely reassuring. You should contact your family doctor within the next 24 hours for a follow up appointment. If you do not have one, we have attached the [facility] Same Day\" Physician line for you to call and they can provide you with one ([phone]). Marla Lognant YOU! From [address] [place] [address] and [facility]    On behalf of the Emergency Department staff at [address] [place] [address], I would like to thank you for giving us the opportunity to address your health care needs and concerns. We hope that during your visit, our service was delivered in a professional and caring manner. Please keep [address] [place] [address] in mind as we walk with you down the path to your own personal wellness. Please expect an automated text message or email from us so we can ask a few questions about your health and progress.  Based on your answers, a clinician may call you back to

## 2024-01-03 ENCOUNTER — TELEPHONE (OUTPATIENT)
Dept: NEUROLOGY | Age: 69
End: 2024-01-03

## 2024-01-03 NOTE — TELEPHONE ENCOUNTER
----- Message from Danitza ESPINOSA DO sent at 12/28/2023  2:29 PM EST -----  Heart rate is low and I DO NOT want her to start propanolol (Inderal).  I want her to follow-up with her PCP to see if she needs to be evaluated by cardiology and/or change medications for slow heart rate.

## 2024-01-03 NOTE — TELEPHONE ENCOUNTER
Call placed to the patient and this was discussed with both Ana and her  Sid.   stated that Ana started the Inderal on 12/15/2023.  Writer explained that I would update Dr. Luevano that Jody had been using the medication for 4 days when she had the test done.  Writer advised that Ana stop the medication and contact the PCP regarding this as Dr. Luevano suggested.   verbally stated his understanding.  Dr. Luevano stopped by writer's office and this information was given.  Dr. Luevano verbally stated her understanding.

## 2024-01-23 ENCOUNTER — HOSPITAL ENCOUNTER (OUTPATIENT)
Dept: MAMMOGRAPHY | Age: 69
Discharge: HOME | End: 2024-01-25
Payer: MEDICARE

## 2024-01-23 ENCOUNTER — HOSPITAL ENCOUNTER (OUTPATIENT)
Dept: ULTRASOUND IMAGING | Age: 69
Discharge: HOME | End: 2024-01-25
Payer: MEDICARE

## 2024-01-23 DIAGNOSIS — R92.8 ABNORMAL MAMMOGRAM OF LEFT BREAST: ICD-10-CM

## 2024-01-23 DIAGNOSIS — Z78.0 POST-MENOPAUSAL: ICD-10-CM

## 2024-01-23 PROCEDURE — 76642 ULTRASOUND BREAST LIMITED: CPT

## 2024-01-23 PROCEDURE — 77066 DX MAMMO INCL CAD BI: CPT

## 2024-01-23 PROCEDURE — 77080 DXA BONE DENSITY AXIAL: CPT

## 2024-02-02 ENCOUNTER — TELEPHONE (OUTPATIENT)
Dept: NEUROLOGY | Age: 69
End: 2024-02-02

## 2024-02-02 NOTE — TELEPHONE ENCOUNTER
Nayeli VIDAL from  [employer] called.  Ana is on Vumerity.   She was getting it last year through [employer] for free.   He was told to call them after the first of the year to get refills.  He called them after Jan. 1 and was advised that he should call when he had as week left.  When he called they told him that the doctors office would have to put through a PA again.   They have [employer] insurance.   Apparently [employer] says the insurance said it is covered at full price when they ran a new benefits investigation.   [employer] is telling him that he has to meet his out of pocket and then deductible copay.  I told him I can assist by contacting [employer] to see what assistance can be done.

## 2024-02-05 NOTE — TELEPHONE ENCOUNTER
I contacted my contact with [employer] and he is no longer with the company. I contacted his former associate and lm for her to call back. I will call [employer] directly later.

## 2024-02-07 NOTE — TELEPHONE ENCOUNTER
Received this message from the drug rep.  Hi Kori,    It looks like a PA is required and if approved, the patient will be covered at 100%. The PA can be submitted by calling [phone].  You may want to ask for an expedited PA to get results as soon as possible.    Due to the patient having Med Part D, we are unable to send comp doses at this time to assist the patient while the PA is pending an outcome.  However, we do now have Vumerity samples available.  If you would like, I could stop by Wednesday and have Dr. Luevano sign for samples.     Please let me know If you have any questions or would like me to come to the office for samples.    Thanks,  Eris Lora  [phone]

## 2024-02-07 NOTE — TELEPHONE ENCOUNTER
I spoke with Ana and her . She is out of medication as of today. I placed a call to Nayeli VIDAL with [facility] to see if she wants me to contact [facility].   I spoke with Eris santoyo with [employer]. He asked me to fax the authorizatoin info. we had to [place] with [employer] at [phone]. He will talk with her and try to get Ana medication as soon as possible. I faxed it.

## 2024-02-09 NOTE — TELEPHONE ENCOUNTER
Eris Lora with [employer] called me. He said that the [employer] team [facility] reviewed and she does qualify for their free drug program. They will be in contact with them.  I called Mr. Kang and let him know. He appreciate everything.

## 2024-03-11 NOTE — PROGRESS NOTES
over age 50 have regular bone density testing. If found found to be osteopenic/osteoperotic, then we request your PCP assist long term management.     7.  The following additional diagnoses were addressed today:      A.  Essential Tremor:      - Restart topiramate:  Start 50 mg at night for 1 week, and then increase to twice weekly.     Continue primidone 50 mg BID for now. We may consider weaning this medication going forward to see if it is having a benefit for her.      - Propranolol was stopped due to bradycardia.      - We could consider titrating up on gabapentin.      B.  LLE weakness:  improved--has been doing better getting in and out of the car and up steps.  I will refer her to neuro-PT in [place].      8.  Return to clinic in 3 months.               - I have independently reviewed all pertinent labs, imaging, reports, and consultant notes.  - I have discussed the diagnosis, prognosis, risks, and goals of therapy, when appropriate, with patient and answered all of the patient's questions/concerns.    Thank you for including us in the care of your patient.   If you have any questions or concerns, please do not hesitate to reach out.  We can be reached at  [phone].     Danitza Luevano DO   Neurology  Director of Multiple Sclerosis &Neuroimmunology  [facility] [place]     I spent 41 minutes on this visit including face to face time with the patient during this visit taking the history, performing the exam, reviewing data, and counseling,  reviewing notes from other providers, labs, personally reviewing imaging, composing this note, and coordinating care.        This note was produced using voice recognition technology, and some typographical errors may be present despite our best efforts with proofreading.

## 2024-03-12 ENCOUNTER — OFFICE VISIT (OUTPATIENT)
Dept: NEUROLOGY | Age: 69
End: 2024-03-12
Payer: MEDICARE

## 2024-03-12 VITALS — HEART RATE: 70 BPM | SYSTOLIC BLOOD PRESSURE: 122 MMHG | DIASTOLIC BLOOD PRESSURE: 79 MMHG

## 2024-03-12 DIAGNOSIS — G25.2 RUBRAL TREMOR: ICD-10-CM

## 2024-03-12 DIAGNOSIS — Z91.89 AT RISK FOR SIDE EFFECT OF MEDICATION: ICD-10-CM

## 2024-03-12 DIAGNOSIS — G35 MULTIPLE SCLEROSIS (HCC): Primary | ICD-10-CM

## 2024-03-12 DIAGNOSIS — E55.9 VITAMIN D DEFICIENCY: ICD-10-CM

## 2024-03-12 PROCEDURE — 1090F PRES/ABSN URINE INCON ASSESS: CPT | Performed by: PSYCHIATRY & NEUROLOGY

## 2024-03-12 PROCEDURE — G8484 FLU IMMUNIZE NO ADMIN: HCPCS | Performed by: PSYCHIATRY & NEUROLOGY

## 2024-03-12 PROCEDURE — 3017F COLORECTAL CA SCREEN DOC REV: CPT | Performed by: PSYCHIATRY & NEUROLOGY

## 2024-03-12 PROCEDURE — 3078F DIAST BP <80 MM HG: CPT | Performed by: PSYCHIATRY & NEUROLOGY

## 2024-03-12 PROCEDURE — G8417 CALC BMI ABV UP PARAM F/U: HCPCS | Performed by: PSYCHIATRY & NEUROLOGY

## 2024-03-12 PROCEDURE — 1123F ACP DISCUSS/DSCN MKR DOCD: CPT | Performed by: PSYCHIATRY & NEUROLOGY

## 2024-03-12 PROCEDURE — 3074F SYST BP LT 130 MM HG: CPT | Performed by: PSYCHIATRY & NEUROLOGY

## 2024-03-12 PROCEDURE — G8427 DOCREV CUR MEDS BY ELIG CLIN: HCPCS | Performed by: PSYCHIATRY & NEUROLOGY

## 2024-03-12 PROCEDURE — 99215 OFFICE O/P EST HI 40 MIN: CPT | Performed by: PSYCHIATRY & NEUROLOGY

## 2024-03-12 PROCEDURE — 1036F TOBACCO NON-USER: CPT | Performed by: PSYCHIATRY & NEUROLOGY

## 2024-03-12 PROCEDURE — G8399 PT W/DXA RESULTS DOCUMENT: HCPCS | Performed by: PSYCHIATRY & NEUROLOGY

## 2024-03-12 RX ORDER — TOPIRAMATE 50 MG/1
TABLET, FILM COATED ORAL
Qty: 60 TABLET | Refills: 5 | Status: SHIPPED | OUTPATIENT
Start: 2024-03-12 | End: 2024-03-26

## 2024-03-12 RX ORDER — PHENOL 1.4 %
1 AEROSOL, SPRAY (ML) MUCOUS MEMBRANE 2 TIMES DAILY PRN
COMMUNITY

## 2024-03-18 DIAGNOSIS — E55.9 VITAMIN D DEFICIENCY: ICD-10-CM

## 2024-03-18 DIAGNOSIS — Z91.89 AT RISK FOR SIDE EFFECT OF MEDICATION: ICD-10-CM

## 2024-03-18 DIAGNOSIS — G35 MULTIPLE SCLEROSIS (HCC): ICD-10-CM

## 2024-03-20 ENCOUNTER — HOSPITAL ENCOUNTER (OUTPATIENT)
Dept: PHYSICAL THERAPY | Facility: CLINIC | Age: 69
Setting detail: THERAPIES SERIES
Discharge: HOME | End: 2024-03-20
Attending: PSYCHIATRY & NEUROLOGY
Payer: MEDICARE

## 2024-03-20 PROCEDURE — 97161 PT EVAL LOW COMPLEX 20 MIN: CPT

## 2024-03-20 NOTE — FLOWSHEET NOTE
January Fall Risk Assessment    Patient Name:  Ana Kang  : 1955    Risk Factor Scale  Score   History of Falls [x] Yes  [] No 25  0 25   Secondary Diagnosis [] Yes  [x] No 15  0 0   Ambulatory Aid [] Furniture  [x] Crutches/cane/walker  [] None/bedrest/wheelchair/nurse 30  15  0 15   IV/Heparin Lock [] Yes  [x] No 20  0 0   Gait/Transferring [x] Impaired  [] Weak  [] Normal/bedrest/immobile 20  10  0 10   Mental Status [] Forgets limitations  [x] Oriented to own ability 15  0 0      Total:     Based on the Assessment score: check the appropriate box.    []  No intervention needed   Low =   Score of 0-24    []  Use standard prevention interventions Moderate =  Score of 24-44   [] Give patient handout and discuss fall prevention strategies   [] Establish goal of education for patient/family RE: fall prevention strategies    [x]  Use high risk prevention interventions High = Score of 45 and higher   [x] Give patient handout and discuss fall prevention strategies   [x] Establish goal of education for patient/family Re: fall prevention strategies   [x] Discuss lifeline / other resources    Electronically signed by:   Jaja Morin PT  Date: 3/20/2024

## 2024-03-20 NOTE — CONSULTS
circuit so as to prevent fatigue and exacerbation of MS.     Specific Instructions for next treatment: Consider nu-step- GENTLE; gastroc wedge stretching; gastroc strengthening; gait training with rollator- focus upon forward gaze/inc foot clearance of B LEs; wide SHIRA EC on firm; wide SHIRA EO on foam; and sit<>stand re-training- all per fatigue levels next visit.     Evaluation Complexity:  History (Personal factors, comorbidities) [] 0 [] 1-2 [x] 3+   Exam (limitations, restrictions) [] 1-2 [] 3 [x] 4+   Clinical presentation (progression) [x] Stable [] Evolving  [] Unstable   Decision Making [x] Low [] Moderate [] High    [x] Low Complexity [] Moderate Complexity [] High Complexity     Treatment Charges: Mins Units   [x] Evaluation       [x]  Low       []  Moderate       []  High 45 1   []  Modalities     []  Ther Exercise     []  Manual Therapy     []  Ther Activities     []  Aquatics     []  Vasocompression     []  Other       TOTAL BILLABLE TIME: 45    Time in: 11:10AM     Time out:11:55AM    Electronically signed by: Jaja Morin PT    Physician Signature:________________________________Date:__________________  By signing above or cosigning this note, I have reviewed this plan of care and certify a need for medically necessary rehabilitation services.     *PLEASE SIGN ABOVE AND FAX BACK ALL PAGES*

## 2024-03-22 ENCOUNTER — HOSPITAL ENCOUNTER (OUTPATIENT)
Dept: PHYSICAL THERAPY | Facility: CLINIC | Age: 69
Setting detail: THERAPIES SERIES
Discharge: HOME | End: 2024-03-22
Attending: PSYCHIATRY & NEUROLOGY
Payer: MEDICARE

## 2024-03-22 PROCEDURE — 97110 THERAPEUTIC EXERCISES: CPT

## 2024-03-22 NOTE — FLOWSHEET NOTE
[] [facility]  Outpatient Rehabilitation &  Therapy  [address]  [phone]  [phone] [] [facility]  Outpatient Rehabilitation &  Therapy  [address]  P: [phone]  F: [phone] [] [facility]  Outpatient Rehabilitation &  Therapy  [address]  P: [phone]  F: [phone] [x] [facility]  Outpatient Rehabilitation &  Therapy  [address]  [phone]  [phone] [] [facility]  Outpatient Rehabilitation &  Therapy  [address]   P: [phone]  F: [phone]  [] [facility]  Outpatient Rehabilitation &  Therapy  [address]  P: [phone]  F: [phone] [] [facility]  Outpatient Rehabilitation &  Therapy  [address]  P: [phone]  F: [phone] [] [facility]  Outpatient Rehabilitation &  Therapy  [address]  P: [phone]  F: [phone] [] [facility]  Outpatient Rehabilitation &  Therapy  [address]  P: [phone]  F: [phone]  [] [facility] Outpatient Rehabilitation &  Therapy  [address]  P: [phone]  F: [phone]     Physical Therapy Daily Treatment Note    Date:  3/22/2024  Patient Name:  Ana Kang    :  1955  MRN: [id number]  Physician: Danitza Luevano DO                                       Insurance: Medicare- [facility]  Medical Diagnosis:   G35 (ICD-10-CM) - Multiple sclerosis (HCC)   G25.2 (ICD-10-CM) - Rubral tremor   Rehab Codes: M62.81; R26.2; Z91.81  Onset Date: 1983; see below                          Next 's appt: 24  Visit# / total visits: ; Progress note for Medicare patient due at visit 10     Cancels/No Shows: 0/0    Subjective:  Patient states she is feeling pretty good today.   Pain:  [] Yes  [x] No Location:

## 2024-03-26 ENCOUNTER — HOSPITAL ENCOUNTER (OUTPATIENT)
Dept: PHYSICAL THERAPY | Facility: CLINIC | Age: 69
Setting detail: THERAPIES SERIES
Discharge: HOME | End: 2024-03-26
Attending: PSYCHIATRY & NEUROLOGY
Payer: MEDICARE

## 2024-03-26 PROCEDURE — 97110 THERAPEUTIC EXERCISES: CPT

## 2024-03-26 NOTE — FLOWSHEET NOTE
[] [facility]  Outpatient Rehabilitation &  Therapy  [address]  [phone]  [phone] [] [facility]  Outpatient Rehabilitation &  Therapy  [address]  P: [phone]  F: [phone] [] [facility]  Outpatient Rehabilitation &  Therapy  [address]  P: [phone]  F: [phone] [x] [facility]  Outpatient Rehabilitation &  Therapy  [address]  [phone]  [phone] [] [facility]  Outpatient Rehabilitation &  Therapy  [address]   P: [phone]  F: [phone]  [] [facility]  Outpatient Rehabilitation &  Therapy  [address]  P: [phone]  F: [phone] [] [facility]  Outpatient Rehabilitation &  Therapy  [address]  P: [phone]  F: [phone] [] [facility]  Outpatient Rehabilitation &  Therapy  [address]  P: [phone]  F: [phone] [] [facility]  Outpatient Rehabilitation &  Therapy  [address]  P: [phone]  F: [phone]  [] [facility] Outpatient Rehabilitation &  Therapy  [address]  P: [phone]  F: [phone]     Physical Therapy Daily Treatment Note    Date:  3/26/2024  Patient Name:  Ana Kang    :  1955  MRN: [id number]  Physician: Danitza Luevano DO                                       Insurance: Medicare- [facility]  Medical Diagnosis:   G35 (ICD-10-CM) - Multiple sclerosis (HCC)   G25.2 (ICD-10-CM) - Rubral tremor   Rehab Codes: M62.81; R26.2; Z91.81  Onset Date: 1983; see below                          Next 's appt: 24  Visit# / total visits: 3/12; Progress note for Medicare patient due at visit 10     Cancels/No Shows: 0/0    Subjective:    Pain:  [] Yes  [x] No Location: NA  Pain Rating: (0-10 scale) 0/10  Pain altered

## 2024-03-29 ENCOUNTER — HOSPITAL ENCOUNTER (OUTPATIENT)
Dept: PHYSICAL THERAPY | Facility: CLINIC | Age: 69
Setting detail: THERAPIES SERIES
Discharge: HOME | End: 2024-03-29
Attending: PSYCHIATRY & NEUROLOGY
Payer: MEDICARE

## 2024-03-29 NOTE — FLOWSHEET NOTE
[] [facility]  Outpatient Rehabilitation &  Therapy  [address]  [phone]  [phone] [] [facility]  Outpatient Rehabilitation &  Therapy  [address]  P: [phone]  F: [phone] [] [facility]  Outpatient Rehabilitation &  Therapy  [address]  P: [phone]  F: [phone] [x] [facility]  Outpatient Rehabilitation &  Therapy  [address]  [phone]  [phone] [] [facility]  Outpatient Rehabilitation &  Therapy  [address]   P: [phone]  F: [phone]  [] [facility]  Outpatient Rehabilitation &  Therapy  [address]  P: [phone]  F: [phone] [] [facility]  Outpatient Rehabilitation &  Therapy  [address]  P: [phone]  F: [phone] [] [facility]  Outpatient Rehabilitation &  Therapy  [address]  P: [phone]  F: [phone] [] [facility]  Outpatient Rehabilitation &  Therapy  [address]  P: [phone]  F: [phone]  [] [facility] Outpatient Rehabilitation &  Therapy  [address]  P: [phone]  F: [phone]     Therapy Cancel/No Show note    Date: 3/29/2024  Patient: Ana Kang  : 1955  MRN: [id number]    Cancels/No Shows to date:     For today's appointment patient:    [x]  Cancelled    [] Rescheduled appointment    [] No-show     Reason given by patient:    [x]  Patient ill    []  Conflicting appointment    [] No transportation      [] Conflict with work    [] No reason given    [] Weather related    [] COVID-19    [] Other:      Comments:        [x] Next appointment was confirmed    Electronically signed by: Oliva Siegle PT

## 2024-04-12 DIAGNOSIS — G25.2 RUBRAL TREMOR: ICD-10-CM

## 2024-04-12 RX ORDER — GABAPENTIN 300 MG/1
300 CAPSULE ORAL 2 TIMES DAILY
Qty: 180 CAPSULE | Refills: 1 | Status: SHIPPED | OUTPATIENT
Start: 2024-04-12 | End: 2024-10-09

## 2024-04-12 NOTE — TELEPHONE ENCOUNTER
Pharmacy requesting refill of gabapentin (NEURONTIN) 300 MG capsule      Medication active on med list yes      Date of last Rx: 10/11/2023 with 1 refills          verified by YOKO BELLO      Date of last appointment 3/12/2024    Next Visit Date:  6/14/2024

## 2024-05-20 ENCOUNTER — TELEPHONE (OUTPATIENT)
Dept: PRIMARY CARE CLINIC | Age: 69
End: 2024-05-20

## 2024-05-20 NOTE — TELEPHONE ENCOUNTER
Patients spouse Sid calling into the office. States that patient has been taking lisinopril and potassium together for a long time. States that they received their refills and there was a paper with the lisinopril stating not to take the medication with potassium. Sid would like to know if it is safe for patient to take together. Please advise

## 2024-05-20 NOTE — TELEPHONE ENCOUNTER
She had recent labs in March and potassium was well within normal range.  Yes, it is safe for her to continue lisinopril and potassium.  Thanks

## 2024-05-29 ENCOUNTER — CLINICAL DOCUMENTATION (OUTPATIENT)
Dept: PHYSICAL THERAPY | Facility: CLINIC | Age: 69
End: 2024-05-29

## 2024-05-29 NOTE — DISCHARGE SUMMARY
[] [facility]  Outpatient Rehabilitation &  Therapy  [address]  [phone]  [phone] [] [facility]  Outpatient Rehabilitation &  Therapy  [address]  P: [phone]  F: [phone] [] [facility]  Outpatient Rehabilitation &  Therapy  [address]  P: [phone]  F: [phone] [x] [facility]  Outpatient Rehabilitation &  Therapy  [address]  [phone]  [phone] [] [facility]  Outpatient Rehabilitation &  Therapy  [address]   P: [phone]  F: [phone]  [] [facility]  Outpatient Rehabilitation &  Therapy  [address]  P: [phone]  F: [phone] [] [facility]  Outpatient Rehabilitation &  Therapy  [address]  P: [phone]  F: [phone] [] [facility]  Outpatient Rehabilitation &  Therapy  [address]  P: [phone]  F: [phone] [] [facility]  Outpatient Rehabilitation &  Therapy  [address]  P: [phone]  F: [phone]  [] [facility] Outpatient Rehabilitation &  Therapy  [address]  P: [phone]  F: [phone]     Physical Therapy Discharge Note    Date: 2024      Patient: Ana Kang  : 1955  MRN: [id number]    Physician: Danitza Luevano DO                                       Insurance: Medicare- [facility]  Medical Diagnosis:   G35 (ICD-10-CM) - Multiple sclerosis (HCC)   G25.2 (ICD-10-CM) - Rubral tremor   Rehab Codes: M62.81; R26.2; Z91.81  Onset Date: 1983; see below                          Next 's appt: 24  Date of initial visit: 3/20/24                Date of final visit: 3/26/24    Assessment:  Pt discharged today from PT services secondary to not returning. Unable to formally re-assess all therapy goals or outcomes

## 2024-06-11 NOTE — TELEPHONE ENCOUNTER
[employer] called in today for a refill on her Vumerity 231 mg.  Last prescription sent 12/7/23 with 5 refills.   Next appt 6/14/24.

## 2024-06-12 RX ORDER — DIROXIMEL FUMARATE 231 MG/1
2 CAPSULE ORAL 2 TIMES DAILY
Qty: 120 CAPSULE | Refills: 4 | OUTPATIENT
Start: 2024-06-12

## 2024-06-13 ENCOUNTER — OFFICE VISIT (OUTPATIENT)
Dept: PRIMARY CARE CLINIC | Age: 69
End: 2024-06-13
Payer: MEDICARE

## 2024-06-13 VITALS
RESPIRATION RATE: 16 BRPM | WEIGHT: 234.2 LBS | HEART RATE: 72 BPM | OXYGEN SATURATION: 93 % | BODY MASS INDEX: 44.22 KG/M2 | SYSTOLIC BLOOD PRESSURE: 122 MMHG | HEIGHT: 61 IN | DIASTOLIC BLOOD PRESSURE: 82 MMHG

## 2024-06-13 DIAGNOSIS — K50.90 CROHN'S DISEASE WITHOUT COMPLICATION, UNSPECIFIED GASTROINTESTINAL TRACT LOCATION (HCC): ICD-10-CM

## 2024-06-13 DIAGNOSIS — G89.29 CHRONIC ANKLE PAIN, BILATERAL: ICD-10-CM

## 2024-06-13 DIAGNOSIS — Z00.00 INITIAL MEDICARE ANNUAL WELLNESS VISIT: Primary | ICD-10-CM

## 2024-06-13 DIAGNOSIS — G35 MULTIPLE SCLEROSIS (HCC): ICD-10-CM

## 2024-06-13 DIAGNOSIS — M25.572 CHRONIC ANKLE PAIN, BILATERAL: ICD-10-CM

## 2024-06-13 DIAGNOSIS — R53.1 GENERALIZED WEAKNESS: ICD-10-CM

## 2024-06-13 DIAGNOSIS — M25.571 CHRONIC ANKLE PAIN, BILATERAL: ICD-10-CM

## 2024-06-13 PROCEDURE — 1123F ACP DISCUSS/DSCN MKR DOCD: CPT | Performed by: NURSE PRACTITIONER

## 2024-06-13 PROCEDURE — 3017F COLORECTAL CA SCREEN DOC REV: CPT | Performed by: NURSE PRACTITIONER

## 2024-06-13 PROCEDURE — G0438 PPPS, INITIAL VISIT: HCPCS | Performed by: NURSE PRACTITIONER

## 2024-06-13 PROCEDURE — 3074F SYST BP LT 130 MM HG: CPT | Performed by: NURSE PRACTITIONER

## 2024-06-13 PROCEDURE — 3079F DIAST BP 80-89 MM HG: CPT | Performed by: NURSE PRACTITIONER

## 2024-06-13 RX ORDER — TROSPIUM CHLORIDE 20 MG/1
20 TABLET, FILM COATED ORAL 2 TIMES DAILY
COMMUNITY
Start: 2024-05-20

## 2024-06-13 RX ORDER — SULFASALAZINE 500 MG/1
500 TABLET ORAL 4 TIMES DAILY
COMMUNITY
Start: 2024-05-16

## 2024-06-13 SDOH — ECONOMIC STABILITY: FOOD INSECURITY: WITHIN THE PAST 12 MONTHS, THE FOOD YOU BOUGHT JUST DIDN'T LAST AND YOU DIDN'T HAVE MONEY TO GET MORE.: NEVER TRUE

## 2024-06-13 SDOH — ECONOMIC STABILITY: FOOD INSECURITY: WITHIN THE PAST 12 MONTHS, YOU WORRIED THAT YOUR FOOD WOULD RUN OUT BEFORE YOU GOT MONEY TO BUY MORE.: NEVER TRUE

## 2024-06-13 SDOH — ECONOMIC STABILITY: INCOME INSECURITY: HOW HARD IS IT FOR YOU TO PAY FOR THE VERY BASICS LIKE FOOD, HOUSING, MEDICAL CARE, AND HEATING?: NOT HARD AT ALL

## 2024-06-13 ASSESSMENT — PATIENT HEALTH QUESTIONNAIRE - PHQ9
SUM OF ALL RESPONSES TO PHQ9 QUESTIONS 1 & 2: 0
SUM OF ALL RESPONSES TO PHQ QUESTIONS 1-9: 0
SUM OF ALL RESPONSES TO PHQ QUESTIONS 1-9: 0
2. FEELING DOWN, DEPRESSED OR HOPELESS: NOT AT ALL
1. LITTLE INTEREST OR PLEASURE IN DOING THINGS: NOT AT ALL
SUM OF ALL RESPONSES TO PHQ QUESTIONS 1-9: 0
SUM OF ALL RESPONSES TO PHQ QUESTIONS 1-9: 0

## 2024-06-13 NOTE — PATIENT INSTRUCTIONS
home?  Keeping your body active can help slow MCI. Exercises like walking can help. Try to stay active mentally too. Read or do things like crossword puzzles if you enjoy doing them.  If you need help coping with MCI, you may want to get support from family, friends, a support group, or a counselor who works with people who have MCI.  Though the future isn't always clear, it can be good to plan ahead with instructions for your care. These are called advanced directives. Having a plan can help make sure that you get the care you want.  Current as of: December 20, 2023  Content Version: 14.1  © 2006-2024 [employer].   Care instructions adapted under license by [employer]. If you have questions about a medical condition or this instruction, always ask your healthcare professional. [employer] disclaims any warranty or liability for your use of this information.           Learning About Vision Tests  What are vision tests?     The four most common vision tests are visual acuity tests, refraction, visual field tests, and color vision tests.  Visual acuity (sharpness) tests  These tests are used:  To see if you need glasses or contact lenses.  To monitor an eye problem.  To check an eye injury.  Visual acuity tests are done as part of routine exams. You may also have this test when you get your 's license or apply for some types of jobs.  Visual field tests  These tests are used:  To check for vision loss in any area of your range of vision.  To screen for certain eye diseases.  To look for nerve damage after a stroke, head injury, or other problem that could reduce blood flow to the brain.  Refraction and color tests  A refraction test is done to find the right prescription for glasses and contact lenses.  A color vision test is done to check for color blindness.  Color vision is often tested as part of a routine exam. You may also have this test when you apply for a job where

## 2024-06-13 NOTE — PROGRESS NOTES
mouth 4 times daily Yes Chacho Edwards MD   trospium (SANCTURA) 20 MG tablet Take 1 tablet by mouth 2 times daily Yes Chacho Edwards MD   Ca Carb-FA-D-B6-B12-Boron-Mg (CALCIUM-FOLIC ACID PLUS D PO) Take by mouth daily Yes Chacho Edwards MD   diclofenac sodium (VOLTAREN) 1 % GEL Apply 4 g topically 4 times daily Yes Liliam Edouard APRN - CNP   Misc. Devices MISC Provide rollator walker with seat Yes Liliam Edouard APRN - CNP   Diroximel Fumarate 231 MG CPDR Take 2 capsules by mouth 2 times daily Yes Danitza Luevano DO   gabapentin (NEURONTIN) 300 MG capsule Take 1 capsule by mouth in the morning and at bedtime for 180 days. Yes Danitza Luevano DO   calcium carbonate 600 MG TABS tablet Take 1 tablet by mouth 2 times daily as needed Yes Chacho Edwards MD   primidone (MYSOLINE) 50 MG tablet Take 1 tablet by mouth in the morning and 1 tablet in the evening. Yes Chacho Edwards MD   mesalamine (LIALDA) 1.2 g EC tablet Take 2 tablets by mouth daily (with breakfast) Yes Chacho Edwards MD   PARoxetine (PAXIL) 30 MG tablet Take 1 tablet by mouth daily Yes Liliam Edouard APRN - CNP   amLODIPine (NORVASC) 5 MG tablet Take 1 tablet by mouth daily Yes Liliam Edouard APRN - CNP   lisinopril-hydroCHLOROthiazide (PRINZIDE;ZESTORETIC) 20-25 MG per tablet Take 1 tablet by mouth daily Yes Liliam Edouard APRN - CNP   potassium chloride (KLOR-CON M20) 20 MEQ extended release tablet Take 1 tablet by mouth daily Yes Liliam Edouard APRN - CNP   omeprazole (PRILOSEC) 20 MG delayed release capsule Take 1 capsule by mouth every morning (before breakfast) Yes Liliam Edouard APRN - CNP   Clobetasol Propionate 0.05 % SHAM Shampoo daily Yes Liliam Edouard APRN - CNP   dicyclomine (BENTYL) 20 MG tablet Take 1 tablet by mouth 4 times daily as needed (after loose stool) Yes Liliam Edouard APRN - CNP   Misc. Devices MISC Provide handicap placard, expires 5 years from

## 2024-06-14 ENCOUNTER — OFFICE VISIT (OUTPATIENT)
Dept: NEUROLOGY | Age: 69
End: 2024-06-14
Payer: MEDICARE

## 2024-06-14 VITALS
HEART RATE: 64 BPM | DIASTOLIC BLOOD PRESSURE: 73 MMHG | HEIGHT: 62 IN | WEIGHT: 225 LBS | SYSTOLIC BLOOD PRESSURE: 124 MMHG | BODY MASS INDEX: 41.41 KG/M2

## 2024-06-14 DIAGNOSIS — M79.2 NEUROPATHIC PAIN: ICD-10-CM

## 2024-06-14 DIAGNOSIS — Z91.89 AT RISK FOR SIDE EFFECT OF MEDICATION: ICD-10-CM

## 2024-06-14 DIAGNOSIS — G35 MULTIPLE SCLEROSIS (HCC): Primary | ICD-10-CM

## 2024-06-14 DIAGNOSIS — G25.2 RUBRAL TREMOR: ICD-10-CM

## 2024-06-14 PROCEDURE — 3017F COLORECTAL CA SCREEN DOC REV: CPT | Performed by: PSYCHIATRY & NEUROLOGY

## 2024-06-14 PROCEDURE — 3074F SYST BP LT 130 MM HG: CPT | Performed by: PSYCHIATRY & NEUROLOGY

## 2024-06-14 PROCEDURE — 1036F TOBACCO NON-USER: CPT | Performed by: PSYCHIATRY & NEUROLOGY

## 2024-06-14 PROCEDURE — G8399 PT W/DXA RESULTS DOCUMENT: HCPCS | Performed by: PSYCHIATRY & NEUROLOGY

## 2024-06-14 PROCEDURE — 1123F ACP DISCUSS/DSCN MKR DOCD: CPT | Performed by: PSYCHIATRY & NEUROLOGY

## 2024-06-14 PROCEDURE — G8427 DOCREV CUR MEDS BY ELIG CLIN: HCPCS | Performed by: PSYCHIATRY & NEUROLOGY

## 2024-06-14 PROCEDURE — 1090F PRES/ABSN URINE INCON ASSESS: CPT | Performed by: PSYCHIATRY & NEUROLOGY

## 2024-06-14 PROCEDURE — 3078F DIAST BP <80 MM HG: CPT | Performed by: PSYCHIATRY & NEUROLOGY

## 2024-06-14 PROCEDURE — G8417 CALC BMI ABV UP PARAM F/U: HCPCS | Performed by: PSYCHIATRY & NEUROLOGY

## 2024-06-14 PROCEDURE — 99215 OFFICE O/P EST HI 40 MIN: CPT | Performed by: PSYCHIATRY & NEUROLOGY

## 2024-06-14 RX ORDER — GABAPENTIN 300 MG/1
600 CAPSULE ORAL 3 TIMES DAILY
Qty: 270 CAPSULE | Refills: 3 | Status: SHIPPED | OUTPATIENT
Start: 2024-06-14 | End: 2024-12-11

## 2024-06-14 NOTE — PATIENT INSTRUCTIONS
- Tremor:      - No change to primidone     - Wean off/stop topiramate     - gabapentin:      To combat neuropathic pain, we will start gabapentin (Neurontin) 300mg caps as follows:      Week #            am                               Midday                        evening  1                      300 mg                        300 mg                        300mg (1 cap)  2                      300mg                         300 mg                        600 mg (2 caps)  3                      600mg                         300mg                         600mg  4                      600 mg                        600 mg                        600 mg      - In terms of DMT, we will plan to continue Vumerity for now and do CBCD every 3 months or sooner if needed.

## 2024-06-14 NOTE — PROGRESS NOTES
[facility], [facility] NEUROLOGY  [address] [address]     Patient:  Ana Kang   :  1955   MRN:  [id number]   Provider:  Liliam Edouard APRN - CNP      Date of Screenin2024     [facility] Screening Tool    Medical Care:                     PRESCRIPTIONS:  Do you have any difficulties with the following:  Affording Medications?   No     Picking Medications Up?  No    Taking Medications as instructed and prescribed?  Decline     IN HOME CARE SERVICES:  Are you seen in your home by any of following:  Home Health?  No   Palliative Care?  No   Hospice?  No     CHRONIC CONDITIONS: Do you need assistance with:  Understanding your providers Plan Of Care?  {marie Yes/No:[id number]  Understanding Medical education materials?  Decline  Urinary Incontinence?  Decline  __________________________________________________________________________________________________    Activities of Daily Living (ADLS):  Do you have difficulty with the any of the following:  Bathing?  No   Toileting?  No   Dressing?  No   Feeding Yourself?  No    Falling?  No   Any Fears of Falling?  No   __________________________________________________________________________________________________    Insurance:  Do you have trouble affording medical care? No   I.e. Office visits, labs, tests that are ordered by your provider?   No   __________________________________________________________________________________________________    Transportation:    Have you ever gone without medical care because you did have a way to get there?  No     Food:  In the past month have you had any difficulties with the following:    Getting groceries?  No   Preparing meals?  No   Concern that you would run out of food?   No     Housing:    Are you worried about not having stable housing in the next 2 months?  No   Is taking care of your home overwhelming? (Mowing, Leaves, Snow 
diroximel fumarate at this time, given the risk for relapse and worsening permanent neurological disability.    2.  Given that Ana Kang is on diroximel fumarate, she needs the following safety monitoring: CBC with differential and CMP.  Given her age we will plan to check a CBC with differential more frequently (every 3 months).  She is due for this lab now.    3.  Ana Kang will be due for a MRI brain w/wo andrzej for ongoing radiographic disease monitoring in November 2024.    4.  I recommend supplementing vitamin D to a goal level of  ng/mL unless there are contraindications.     5. Multiple Sclerosis is an independent risk factor for accelerated bone loss (osteopenia/osteoperosis) and pathologic fractures. As such, we recommend that all MS patients over age 50 have regular bone density testing. If found found to be osteopenic/osteoperotic, then we request your PCP assist long term management.      7.  The following additional diagnoses were addressed today:     A.  Tremor:     - No change to primidone (continue 50 mg twice daily).    - Wean off/stop topiramate: I asked her to cut back to topiramate 50 mg at night for 1 week and then stop.    -We will start a trial of gabapentin to see if this improves her tremor.  We reviewed the risks and common side effects of this medication at length including the risk for falls, dizziness, and sedation.  We discussed that if she is having these side effects, we will need to return down to a lower dose.  I recommend increasing the dose as tolerated as follows:    Week # am   Midday   evening  1  300 mg  300 mg  300mg (1 cap)  2  300mg   300 mg  600 mg (2 caps)  3  600mg   300mg   600mg  4  600 mg   600 mg  600 mg    B.  Mobility concerns: Meeting with the ability center today at their home.  Ongoing concerns include difficulty getting in and out of the house.  She recently had a new rollator ordered by her PCP.  I really appreciate their care of our

## 2024-09-12 ENCOUNTER — OFFICE VISIT (OUTPATIENT)
Dept: NEUROLOGY | Age: 69
End: 2024-09-12
Payer: MEDICARE

## 2024-09-12 VITALS
DIASTOLIC BLOOD PRESSURE: 80 MMHG | HEART RATE: 60 BPM | BODY MASS INDEX: 42.48 KG/M2 | SYSTOLIC BLOOD PRESSURE: 143 MMHG | WEIGHT: 225 LBS | HEIGHT: 61 IN

## 2024-09-12 DIAGNOSIS — E66.01 OBESITY, CLASS III, BMI 40-49.9 (MORBID OBESITY) (HCC): ICD-10-CM

## 2024-09-12 DIAGNOSIS — G35 MULTIPLE SCLEROSIS (HCC): Primary | ICD-10-CM

## 2024-09-12 DIAGNOSIS — G25.2 RUBRAL TREMOR: ICD-10-CM

## 2024-09-12 DIAGNOSIS — G35 MULTIPLE SCLEROSIS (HCC): ICD-10-CM

## 2024-09-12 DIAGNOSIS — Z91.89 AT RISK FOR SIDE EFFECT OF MEDICATION: ICD-10-CM

## 2024-09-12 PROCEDURE — 1036F TOBACCO NON-USER: CPT | Performed by: PSYCHIATRY & NEUROLOGY

## 2024-09-12 PROCEDURE — 1123F ACP DISCUSS/DSCN MKR DOCD: CPT | Performed by: PSYCHIATRY & NEUROLOGY

## 2024-09-12 PROCEDURE — G8399 PT W/DXA RESULTS DOCUMENT: HCPCS | Performed by: PSYCHIATRY & NEUROLOGY

## 2024-09-12 PROCEDURE — 1090F PRES/ABSN URINE INCON ASSESS: CPT | Performed by: PSYCHIATRY & NEUROLOGY

## 2024-09-12 PROCEDURE — 3077F SYST BP >= 140 MM HG: CPT | Performed by: PSYCHIATRY & NEUROLOGY

## 2024-09-12 PROCEDURE — G8427 DOCREV CUR MEDS BY ELIG CLIN: HCPCS | Performed by: PSYCHIATRY & NEUROLOGY

## 2024-09-12 PROCEDURE — 3079F DIAST BP 80-89 MM HG: CPT | Performed by: PSYCHIATRY & NEUROLOGY

## 2024-09-12 PROCEDURE — G8417 CALC BMI ABV UP PARAM F/U: HCPCS | Performed by: PSYCHIATRY & NEUROLOGY

## 2024-09-12 PROCEDURE — 3017F COLORECTAL CA SCREEN DOC REV: CPT | Performed by: PSYCHIATRY & NEUROLOGY

## 2024-09-12 PROCEDURE — 99214 OFFICE O/P EST MOD 30 MIN: CPT | Performed by: PSYCHIATRY & NEUROLOGY

## 2024-09-12 RX ORDER — PRIMIDONE 50 MG/1
100 TABLET ORAL 2 TIMES DAILY
Qty: 360 TABLET | Refills: 0 | Status: SHIPPED | OUTPATIENT
Start: 2024-09-12 | End: 2024-12-11

## 2024-09-12 RX ORDER — PRIMIDONE 50 MG/1
100 TABLET ORAL 2 TIMES DAILY
Qty: 360 TABLET | Refills: 0 | Status: SHIPPED | OUTPATIENT
Start: 2024-09-12 | End: 2024-09-12 | Stop reason: SDUPTHER

## 2024-09-23 DIAGNOSIS — R10.13 DYSPEPSIA: ICD-10-CM

## 2024-09-23 DIAGNOSIS — I10 BENIGN ESSENTIAL HTN: ICD-10-CM

## 2024-09-23 RX ORDER — AMLODIPINE BESYLATE 5 MG/1
5 TABLET ORAL DAILY
Qty: 90 TABLET | Refills: 3 | Status: SHIPPED | OUTPATIENT
Start: 2024-09-23

## 2024-10-25 DIAGNOSIS — F41.9 ANXIETY: ICD-10-CM

## 2024-10-25 DIAGNOSIS — I10 BENIGN ESSENTIAL HTN: ICD-10-CM

## 2024-10-25 RX ORDER — PAROXETINE 30 MG/1
30 TABLET, FILM COATED ORAL DAILY
Qty: 90 TABLET | Refills: 3 | Status: SHIPPED | OUTPATIENT
Start: 2024-10-25

## 2024-10-25 RX ORDER — POTASSIUM CHLORIDE 1500 MG/1
20 TABLET, EXTENDED RELEASE ORAL DAILY
Qty: 90 TABLET | Refills: 3 | Status: SHIPPED | OUTPATIENT
Start: 2024-10-25

## 2024-10-25 RX ORDER — LISINOPRIL AND HYDROCHLOROTHIAZIDE 20; 25 MG/1; MG/1
1 TABLET ORAL DAILY
Qty: 90 TABLET | Refills: 3 | Status: SHIPPED | OUTPATIENT
Start: 2024-10-25

## 2024-11-04 ENCOUNTER — HOSPITAL ENCOUNTER (OUTPATIENT)
Dept: MRI IMAGING | Age: 69
Discharge: HOME | End: 2024-11-06
Attending: PSYCHIATRY & NEUROLOGY
Payer: MEDICARE

## 2024-11-04 DIAGNOSIS — Z91.89 AT RISK FOR SIDE EFFECT OF MEDICATION: ICD-10-CM

## 2024-11-04 DIAGNOSIS — G35 MULTIPLE SCLEROSIS (HCC): ICD-10-CM

## 2024-11-04 LAB — CREAT BLD-MCNC: 0.9 MG/DL (ref 0.6–1.4)

## 2024-11-04 PROCEDURE — 70553 MRI BRAIN STEM W/O & W/DYE: CPT

## 2024-11-04 PROCEDURE — 2580000003 HC RX 258: Performed by: PSYCHIATRY & NEUROLOGY

## 2024-11-04 PROCEDURE — 6360000004 HC RX CONTRAST MEDICATION: Performed by: PSYCHIATRY & NEUROLOGY

## 2024-11-04 PROCEDURE — 82565 ASSAY OF CREATININE: CPT

## 2024-11-04 PROCEDURE — A9579 GAD-BASE MR CONTRAST NOS,1ML: HCPCS | Performed by: PSYCHIATRY & NEUROLOGY

## 2024-11-04 RX ORDER — SODIUM CHLORIDE 0.9 % (FLUSH) 0.9 %
10 SYRINGE (ML) INJECTION PRN
Status: DISCONTINUED | OUTPATIENT
Start: 2024-11-04 | End: 2024-11-07 | Stop reason: HOSPADM

## 2024-11-04 RX ADMIN — SODIUM CHLORIDE, PRESERVATIVE FREE 10 ML: 5 INJECTION INTRAVENOUS at 10:51

## 2024-11-04 RX ADMIN — GADOTERIDOL 20 ML: 279.3 INJECTION, SOLUTION INTRAVENOUS at 10:50

## 2024-12-06 RX ORDER — DIROXIMEL FUMARATE 231 MG/1
2 CAPSULE ORAL 2 TIMES DAILY
Qty: 120 CAPSULE | Refills: 5 | Status: ACTIVE | OUTPATIENT
Start: 2024-12-06

## 2024-12-06 NOTE — TELEPHONE ENCOUNTER
Pharmacy requesting refill of Vumerity 231mg.      Medication active on med list yes      Date of last Rx: 6/11/2024 with 5 refills          verified by EDUARDO BOOKER      Date of last appointment 9/12/2024    Next Visit Date:  Visit date not found

## 2024-12-12 ENCOUNTER — OFFICE VISIT (OUTPATIENT)
Dept: NEUROLOGY | Age: 69
End: 2024-12-12
Payer: MEDICARE

## 2024-12-12 VITALS
DIASTOLIC BLOOD PRESSURE: 81 MMHG | BODY MASS INDEX: 42.51 KG/M2 | HEART RATE: 70 BPM | SYSTOLIC BLOOD PRESSURE: 135 MMHG | HEIGHT: 61 IN

## 2024-12-12 DIAGNOSIS — Z91.89 AT RISK FOR SIDE EFFECT OF MEDICATION: Primary | ICD-10-CM

## 2024-12-12 DIAGNOSIS — G35 MULTIPLE SCLEROSIS (HCC): ICD-10-CM

## 2024-12-12 DIAGNOSIS — G25.2 RUBRAL TREMOR: ICD-10-CM

## 2024-12-12 PROCEDURE — 99214 OFFICE O/P EST MOD 30 MIN: CPT | Performed by: PSYCHIATRY & NEUROLOGY

## 2024-12-12 PROCEDURE — 1159F MED LIST DOCD IN RCRD: CPT | Performed by: PSYCHIATRY & NEUROLOGY

## 2024-12-12 PROCEDURE — G8399 PT W/DXA RESULTS DOCUMENT: HCPCS | Performed by: PSYCHIATRY & NEUROLOGY

## 2024-12-12 PROCEDURE — G8417 CALC BMI ABV UP PARAM F/U: HCPCS | Performed by: PSYCHIATRY & NEUROLOGY

## 2024-12-12 PROCEDURE — G8427 DOCREV CUR MEDS BY ELIG CLIN: HCPCS | Performed by: PSYCHIATRY & NEUROLOGY

## 2024-12-12 PROCEDURE — 1090F PRES/ABSN URINE INCON ASSESS: CPT | Performed by: PSYCHIATRY & NEUROLOGY

## 2024-12-12 PROCEDURE — 3079F DIAST BP 80-89 MM HG: CPT | Performed by: PSYCHIATRY & NEUROLOGY

## 2024-12-12 PROCEDURE — 1036F TOBACCO NON-USER: CPT | Performed by: PSYCHIATRY & NEUROLOGY

## 2024-12-12 PROCEDURE — 3017F COLORECTAL CA SCREEN DOC REV: CPT | Performed by: PSYCHIATRY & NEUROLOGY

## 2024-12-12 PROCEDURE — 1123F ACP DISCUSS/DSCN MKR DOCD: CPT | Performed by: PSYCHIATRY & NEUROLOGY

## 2024-12-12 PROCEDURE — 3075F SYST BP GE 130 - 139MM HG: CPT | Performed by: PSYCHIATRY & NEUROLOGY

## 2024-12-12 PROCEDURE — G8484 FLU IMMUNIZE NO ADMIN: HCPCS | Performed by: PSYCHIATRY & NEUROLOGY

## 2024-12-12 NOTE — PATIENT INSTRUCTIONS
Teriflunomide (Aubagio) is a disease modifying therapy to treat relapsing remitting multiple sclerosis.     Mechanism of Action: It is the active metabolite of Leflunomide (ARAVA) in treatment of Rheumatoid Arthritis. This once a day pill interrupts pyrimidine synthesis and works as a form of mild immunosuppression.     Efficacy, : We reviewed efficacy, safety and tolerability data from the TEMSO, TOWER and TENERE  trials, long term follow-up studies, and the [facility]'s experience with teriflunomide (Aubagio). In the TEMSO trial, 14mg teriflunomide  (ARR 0.37) decreased relapse rate by 31% compared to placebo (ARR 0.54). In the TENERE trial, no differences in risk of treatment failure (from relapse or other reasons to stop drug) were found between teriflunomide and IFNb1a (rebif). Both drugs had similar relapse rates (teriflunomide ARR 0.26, rebif  ARR 0.22). MARIANGEL (no evidence of disease activity: no attacks, no 3 month confirmed disability progression, no new / enlarged T2 bright lesions or andrzej+ lesions on MRI) was achieved in 23% of 14mg teriflunomide treated patients, compared to 14% of placebo patients.     Safety and Tolerability data: Common side effects of teriflunomide include diarrhea, nausea, transient hair thinning, ALT elevations and changes to blood pressure.    Teriflunomide and preganancy: Teriflunomide is preganancy category X. Patients who might consider having children in the next several years may not be the best fit for this drug. It takes up to 18-24 months for the drug to clear your system, unless a rapid elimination protocol is used to clear the drug faster. Any patient taking teriflunomide must use proper birth control.     Required screening tests: We must check a TB test (PPD or quantiferon), pregnancy test, CBC and LFTs before starting this therapy.     Saftey Monitoring: We must check monthly ALT for the first 6 months while on teriflunomide, then every 3-6 months

## 2024-12-13 ENCOUNTER — OFFICE VISIT (OUTPATIENT)
Dept: PRIMARY CARE CLINIC | Age: 69
End: 2024-12-13
Payer: MEDICARE

## 2024-12-13 ENCOUNTER — HOSPITAL ENCOUNTER (OUTPATIENT)
Age: 69
Setting detail: SPECIMEN
Discharge: HOME | End: 2024-12-13

## 2024-12-13 VITALS
OXYGEN SATURATION: 97 % | BODY MASS INDEX: 42.51 KG/M2 | WEIGHT: 225 LBS | SYSTOLIC BLOOD PRESSURE: 130 MMHG | HEART RATE: 72 BPM | DIASTOLIC BLOOD PRESSURE: 76 MMHG

## 2024-12-13 DIAGNOSIS — K50.90 CROHN'S DISEASE WITHOUT COMPLICATION, UNSPECIFIED GASTROINTESTINAL TRACT LOCATION (HCC): ICD-10-CM

## 2024-12-13 DIAGNOSIS — Z91.89 AT RISK FOR SIDE EFFECT OF MEDICATION: ICD-10-CM

## 2024-12-13 DIAGNOSIS — G35 MULTIPLE SCLEROSIS (HCC): ICD-10-CM

## 2024-12-13 DIAGNOSIS — F41.9 ANXIETY: ICD-10-CM

## 2024-12-13 DIAGNOSIS — L84 CALLUS OF FOOT: ICD-10-CM

## 2024-12-13 DIAGNOSIS — L40.9 SCALP PSORIASIS: ICD-10-CM

## 2024-12-13 DIAGNOSIS — I10 BENIGN ESSENTIAL HTN: Primary | ICD-10-CM

## 2024-12-13 LAB
BASOPHILS # BLD: 0.07 K/UL (ref 0–0.2)
BASOPHILS NFR BLD: 1 % (ref 0–2)
EOSINOPHIL # BLD: 0.07 K/UL (ref 0–0.4)
EOSINOPHILS RELATIVE PERCENT: 1 % (ref 1–4)
ERYTHROCYTE [DISTWIDTH] IN BLOOD BY AUTOMATED COUNT: 13.9 % (ref 11.8–14.4)
HCT VFR BLD AUTO: 42.1 % (ref 36.3–47.1)
HGB BLD-MCNC: 13 G/DL (ref 11.9–15.1)
IMM GRANULOCYTES # BLD AUTO: 0 K/UL (ref 0–0.3)
IMM GRANULOCYTES NFR BLD: 0 %
LYMPHOCYTES NFR BLD: 0.94 K/UL (ref 1–4.8)
LYMPHOCYTES RELATIVE PERCENT: 13 % (ref 24–44)
MCH RBC QN AUTO: 30.7 PG (ref 25.2–33.5)
MCHC RBC AUTO-ENTMCNC: 30.9 G/DL (ref 28.4–34.8)
MCV RBC AUTO: 99.5 FL (ref 82.6–102.9)
MONOCYTES NFR BLD: 0.5 K/UL (ref 0.1–0.8)
MONOCYTES NFR BLD: 7 % (ref 1–7)
MORPHOLOGY: NORMAL
NEUTROPHILS NFR BLD: 78 % (ref 36–66)
NEUTS SEG NFR BLD: 5.62 K/UL (ref 1.8–7.7)
NRBC BLD-RTO: 0 PER 100 WBC
PLATELET # BLD AUTO: 287 K/UL (ref 138–453)
PMV BLD AUTO: 10.5 FL (ref 8.1–13.5)
RBC # BLD AUTO: 4.23 M/UL (ref 3.95–5.11)
WBC OTHER # BLD: 7.2 K/UL (ref 3.5–11.3)

## 2024-12-13 PROCEDURE — 3078F DIAST BP <80 MM HG: CPT | Performed by: NURSE PRACTITIONER

## 2024-12-13 PROCEDURE — 1090F PRES/ABSN URINE INCON ASSESS: CPT | Performed by: NURSE PRACTITIONER

## 2024-12-13 PROCEDURE — 1036F TOBACCO NON-USER: CPT | Performed by: NURSE PRACTITIONER

## 2024-12-13 PROCEDURE — G8417 CALC BMI ABV UP PARAM F/U: HCPCS | Performed by: NURSE PRACTITIONER

## 2024-12-13 PROCEDURE — 1123F ACP DISCUSS/DSCN MKR DOCD: CPT | Performed by: NURSE PRACTITIONER

## 2024-12-13 PROCEDURE — 3017F COLORECTAL CA SCREEN DOC REV: CPT | Performed by: NURSE PRACTITIONER

## 2024-12-13 PROCEDURE — G8427 DOCREV CUR MEDS BY ELIG CLIN: HCPCS | Performed by: NURSE PRACTITIONER

## 2024-12-13 PROCEDURE — 1160F RVW MEDS BY RX/DR IN RCRD: CPT | Performed by: NURSE PRACTITIONER

## 2024-12-13 PROCEDURE — 99214 OFFICE O/P EST MOD 30 MIN: CPT | Performed by: NURSE PRACTITIONER

## 2024-12-13 PROCEDURE — G8399 PT W/DXA RESULTS DOCUMENT: HCPCS | Performed by: NURSE PRACTITIONER

## 2024-12-13 PROCEDURE — 1159F MED LIST DOCD IN RCRD: CPT | Performed by: NURSE PRACTITIONER

## 2024-12-13 PROCEDURE — 3075F SYST BP GE 130 - 139MM HG: CPT | Performed by: NURSE PRACTITIONER

## 2024-12-13 PROCEDURE — G8484 FLU IMMUNIZE NO ADMIN: HCPCS | Performed by: NURSE PRACTITIONER

## 2024-12-13 RX ORDER — CLOBETASOL PROPIONATE 0.05 G/100ML
SHAMPOO TOPICAL
Qty: 118 ML | Refills: 2 | Status: SHIPPED | OUTPATIENT
Start: 2024-12-13

## 2024-12-13 RX ORDER — KETOCONAZOLE 20 MG/ML
SHAMPOO, SUSPENSION TOPICAL
Qty: 120 ML | Refills: 5 | Status: SHIPPED | OUTPATIENT
Start: 2024-12-13 | End: 2024-12-13 | Stop reason: ALTCHOICE

## 2024-12-13 ASSESSMENT — ENCOUNTER SYMPTOMS
CONSTIPATION: 0
SORE THROAT: 0
WHEEZING: 0
DIARRHEA: 0
NAUSEA: 0
SHORTNESS OF BREATH: 0
TROUBLE SWALLOWING: 0
COUGH: 0
SINUS PRESSURE: 0
BLOOD IN STOOL: 0
VOMITING: 0
ABDOMINAL PAIN: 0

## 2024-12-13 NOTE — PROGRESS NOTES
General: Skin is warm and dry.      Findings: Rash (Mild macular pink rash with peeling and areas on frontal scalp) present.   Neurological:      Mental Status: She is alert and oriented to person, place, and time.   Psychiatric:         Behavior: Behavior normal.         Thought Content: Thought content normal.         Judgment: Judgment normal.       /76   Pulse 72   Wt 102.1 kg (225 lb)   SpO2 97%   BMI 42.51 kg/m²     Assessment:       Diagnosis Orders   1. Benign essential HTN        2. Multiple sclerosis (HCC)        3. Crohn's disease without complication, unspecified gastrointestinal tract location (HCC)        4. Anxiety        5. Callus of foot  [facility] Bian[facility] Murillo DPM, Podiatry, [place]      6. Scalp psoriasis  Clobetasol Propionate 0.05 % SHAM    DISCONTINUED: ketoconazole (NIZORAL) 2 % shampoo                Plan:   Assessment & Plan   Return in about 6 months (around 6/13/2025) for hypertension check.  Hypertension-remains well-controlled on amlodipine, lisinopril/HCTZ  MS-reviewed recent visit with neuro, she has been referred to [facility] for worsening tremors and will be going there next week  Crohns-no recent exacerbations  Anxiety-stable on current dose paxil  Callus of foot-referral to podiatry for treatment options  Scalp psoriasis-refill on shampoo for as needed use      Patient given educational materials - see patient instructions.Discussed use, benefit, and side effects of prescribed medications.  All patientquestions answered. Pt voiced understanding. Reviewed health maintenance.  Instructedto continue current medications, diet and exercise.  Patient agreed with treatmentplan. Follow up as directed.     Electronicallysigned by SJ Bermeo CNP on 12/13/2024 at 11:28 AM

## 2024-12-16 LAB
QUANTI TB GOLD PLUS: NORMAL
QUANTI TB1 MINUS NIL: 0 IU/ML
QUANTI TB2 MINUS NIL: 0 IU/ML
QUANTIFERON MITOGEN: 0.13 IU/ML
QUANTIFERON NIL: 0.01 IU/ML

## 2024-12-26 ENCOUNTER — HOSPITAL ENCOUNTER (OUTPATIENT)
Age: 69
Discharge: HOME | End: 2024-12-28
Payer: MEDICARE

## 2024-12-26 ENCOUNTER — HOSPITAL ENCOUNTER (OUTPATIENT)
Dept: GENERAL RADIOLOGY | Age: 69
Discharge: HOME | End: 2024-12-28
Payer: MEDICARE

## 2024-12-26 ENCOUNTER — HOSPITAL ENCOUNTER (OUTPATIENT)
Age: 69
Discharge: HOME | End: 2024-12-26

## 2024-12-26 DIAGNOSIS — R76.12 REACTION TO QUANTIFERON-TB TEST (QFT) WITHOUT ACTIVE TUBERCULOSIS: ICD-10-CM

## 2024-12-26 PROCEDURE — 71046 X-RAY EXAM CHEST 2 VIEWS: CPT

## 2025-01-06 ENCOUNTER — TELEPHONE (OUTPATIENT)
Dept: NEUROLOGY | Age: 70
End: 2025-01-06

## 2025-01-06 NOTE — TELEPHONE ENCOUNTER
[employer] sent a notice that they had notified Ana that the Medicare [employer] Free [employer] Program enrollment ends 12/31/24. No action is required by our office. The patients were notified in the fall.

## 2025-01-07 ENCOUNTER — OFFICE VISIT (OUTPATIENT)
Dept: PRIMARY CARE CLINIC | Age: 70
End: 2025-01-07
Payer: MEDICARE

## 2025-01-07 ENCOUNTER — HOSPITAL ENCOUNTER (OUTPATIENT)
Age: 70
Setting detail: SPECIMEN
Discharge: HOME | End: 2025-01-07

## 2025-01-07 VITALS — HEART RATE: 57 BPM | OXYGEN SATURATION: 90 % | DIASTOLIC BLOOD PRESSURE: 76 MMHG | SYSTOLIC BLOOD PRESSURE: 138 MMHG

## 2025-01-07 DIAGNOSIS — R29.6 FREQUENT FALLS: Primary | ICD-10-CM

## 2025-01-07 DIAGNOSIS — I51.7 ENLARGED HEART: ICD-10-CM

## 2025-01-07 DIAGNOSIS — G35 MULTIPLE SCLEROSIS (HCC): ICD-10-CM

## 2025-01-07 DIAGNOSIS — E66.01 OBESITY, CLASS III, BMI 40-49.9 (MORBID OBESITY): ICD-10-CM

## 2025-01-07 DIAGNOSIS — R76.12 REACTION TO QUANTIFERON-TB TEST (QFT) WITHOUT ACTIVE TUBERCULOSIS: ICD-10-CM

## 2025-01-07 DIAGNOSIS — R29.898 BILATERAL LEG WEAKNESS: ICD-10-CM

## 2025-01-07 PROCEDURE — 1160F RVW MEDS BY RX/DR IN RCRD: CPT | Performed by: NURSE PRACTITIONER

## 2025-01-07 PROCEDURE — 1036F TOBACCO NON-USER: CPT | Performed by: NURSE PRACTITIONER

## 2025-01-07 PROCEDURE — 3017F COLORECTAL CA SCREEN DOC REV: CPT | Performed by: NURSE PRACTITIONER

## 2025-01-07 PROCEDURE — 1090F PRES/ABSN URINE INCON ASSESS: CPT | Performed by: NURSE PRACTITIONER

## 2025-01-07 PROCEDURE — 1159F MED LIST DOCD IN RCRD: CPT | Performed by: NURSE PRACTITIONER

## 2025-01-07 PROCEDURE — 99214 OFFICE O/P EST MOD 30 MIN: CPT | Performed by: NURSE PRACTITIONER

## 2025-01-07 PROCEDURE — M1308 PR FLU IMMUNIZE NO ADMIN: HCPCS | Performed by: NURSE PRACTITIONER

## 2025-01-07 PROCEDURE — 3075F SYST BP GE 130 - 139MM HG: CPT | Performed by: NURSE PRACTITIONER

## 2025-01-07 PROCEDURE — 3078F DIAST BP <80 MM HG: CPT | Performed by: NURSE PRACTITIONER

## 2025-01-07 PROCEDURE — G8417 CALC BMI ABV UP PARAM F/U: HCPCS | Performed by: NURSE PRACTITIONER

## 2025-01-07 PROCEDURE — G8427 DOCREV CUR MEDS BY ELIG CLIN: HCPCS | Performed by: NURSE PRACTITIONER

## 2025-01-07 PROCEDURE — 1123F ACP DISCUSS/DSCN MKR DOCD: CPT | Performed by: NURSE PRACTITIONER

## 2025-01-07 PROCEDURE — G8399 PT W/DXA RESULTS DOCUMENT: HCPCS | Performed by: NURSE PRACTITIONER

## 2025-01-07 ASSESSMENT — ENCOUNTER SYMPTOMS
ABDOMINAL PAIN: 0
SHORTNESS OF BREATH: 0
NAUSEA: 0
WHEEZING: 0
SINUS PRESSURE: 0
TROUBLE SWALLOWING: 0
VOMITING: 0
CONSTIPATION: 0
COUGH: 0
BLOOD IN STOOL: 0
SORE THROAT: 0
DIARRHEA: 0

## 2025-01-07 ASSESSMENT — PATIENT HEALTH QUESTIONNAIRE - PHQ9
SUM OF ALL RESPONSES TO PHQ QUESTIONS 1-9: 0
2. FEELING DOWN, DEPRESSED OR HOPELESS: NOT AT ALL
1. LITTLE INTEREST OR PLEASURE IN DOING THINGS: NOT AT ALL
SUM OF ALL RESPONSES TO PHQ QUESTIONS 1-9: 0
SUM OF ALL RESPONSES TO PHQ9 QUESTIONS 1 & 2: 0
SUM OF ALL RESPONSES TO PHQ QUESTIONS 1-9: 0
SUM OF ALL RESPONSES TO PHQ QUESTIONS 1-9: 0

## 2025-01-07 NOTE — PROGRESS NOTES
[facility]  [facility] PRIMARY CARE  [address] [place] [address]   [address]  [facility] [address]  Dept: [phone]  Dept Fax: [phone]    Ana Kang is a 69 y.o. female who presentstoday for her medical conditions/complaints as noted below.  Ana Kang is c/o of  Chief Complaint   Patient presents with    Results     Discuss xray ordered by Dr Luevano    Fall     Frequent falls    Weight Loss     Discuss possible medication for wt loss         HPI:     History of Present Illness  The patient presents for evaluation of falls, weight management, and finding of heart enlargement on recent chest x-ray    She has been experiencing an increased frequency of falls, often due to her legs giving way. Despite these incidents, she has not sustained any injuries. The falls typically occur later in the day, but she also experienced a fall while descending two steps en route to the clinic today. She is unable to rise independently after a fall and requires assistance. Her son has identified a potential solution online, a device that could assist her in standing up from the floor, but they are uncertain about its availability through durable medical goods providers.  She has a history of multiple sclerosis (MS) and uses a wheelchair for mobility.     Asking about weight loss medications/assistance.  She states she is aware she has gained weight and this contributes to her poor mobility    She underwent a chest x-ray at [facility] to rule out tuberculosis (TB) before starting a new medication for her MS. The x-ray revealed an enlarged heart, a finding that was not present on a previous x-ray taken at [facility] in 2021. She has no personal history of cardiac issues, although her father had a cardiac history. She recalls being informed about a potential heart defect (\"hole in the heart\") approximately 40 years ago, prior to her diagnosis of MS.She reports no shortness of breath during

## 2025-01-12 LAB — T-SPOT TB TEST: NORMAL

## 2025-01-15 ENCOUNTER — HOSPITAL ENCOUNTER (OUTPATIENT)
Dept: PHYSICAL THERAPY | Facility: CLINIC | Age: 70
Setting detail: THERAPIES SERIES
Discharge: HOME | End: 2025-01-15
Payer: MEDICARE

## 2025-01-15 PROCEDURE — 97110 THERAPEUTIC EXERCISES: CPT

## 2025-01-15 PROCEDURE — 97162 PT EVAL MOD COMPLEX 30 MIN: CPT

## 2025-01-15 NOTE — CONSULTS
Evaluation       []  Low       [x]  Moderate       []  High 25 1    []  Modalities        [x]  Ther Exercise 25 2    []  Neuromuscular Re-ed      []  Gait Training      []  Manual Therapy      []  Ther Activities      []  Aquatics      []  Vasocompression      []  Cervical Traction      []  Other      Total Billable time 50 min          Time in:1100     Time out:1150    Electronically signed by: Ramu Huff PT        Physician Signature:________________________________Date:__________________  By signing above or cosigning this note, I have reviewed this plan of care and certify a need for medically necessary rehabilitation services.     *PLEASE SIGN ABOVE AND FAX BACK ALL PAGES*

## 2025-01-17 ENCOUNTER — HOSPITAL ENCOUNTER (OUTPATIENT)
Dept: PHYSICAL THERAPY | Facility: CLINIC | Age: 70
Setting detail: THERAPIES SERIES
Discharge: HOME | End: 2025-01-17
Payer: MEDICARE

## 2025-01-17 PROCEDURE — 97112 NEUROMUSCULAR REEDUCATION: CPT

## 2025-01-17 PROCEDURE — 97110 THERAPEUTIC EXERCISES: CPT

## 2025-01-17 NOTE — FLOWSHEET NOTE
for improved hip flex/dorsiflex iso strength, SL balance and endurance, completed w/ CGA and w/c follow. Updated HEP and reviewed. Instructed patient on bringing Rolator for next session    [] No change.     [] Other:  [x] Patient would continue to benefit from skilled physical therapy services in order to: The patient is a 69 year old female with a chief complaint of LE weakness and frequent falls and signs and symproms consistent with a diagnosis of MS.  She present with decreased AROM, LE weakness, reduced balance which results in functional limitation with bathing, dressing, cooking, cleaning, transfers and ambulation.  She will benefit from skilled physical therapy to address above limitations.     STG: (to be met in 10 treatments)  ? Function: Patient will ambulate >100' with Rolling walker or rollator.  Patient to be independent with home exercise program as demonstrated by performance with correct form without cues.  Demonstrate Knowledge of fall prevention  LTG: (to be met in 20 treatments)  Patient will ascend and descend 2 steps with CGA to allow her to safely get in and out of her home  Patient will perform  5x sit to  <30 sec with moderate use of hands  Patient will go 4+ weeks without a reported fall.  Patient/family with have strategy in place to transfer from floor to chair.    Pt. Education:  [x] Yes  [] No  [x] Reviewed Prior HEP/Ed  Method of Education: [x] Verbal  [] Demo  [] Written  Access Code: [email or web address]  URL: [email or web address]  Date: 01/17/2025  Prepared by: Shaggy Yañez    Exercises  - Seated March  - 2 x daily - 7 x weekly - 2 sets - 5 reps  - Sit to Stand with Counter Support  - 1 x daily - 7 x weekly - 2 sets - 5 reps  - Standing March with Counter Support  - 1 x daily - 7 x weekly - 2 sets - 5 reps  - Standing Hip Abduction with Counter Support  - 1 x daily - 7 x weekly - 2 sets - 5 reps  - Heel Raises with Counter Support  - 1 x daily - 7 x weekly - 2 sets - 10 reps  -

## 2025-01-20 ENCOUNTER — HOSPITAL ENCOUNTER (OUTPATIENT)
Dept: PHYSICAL THERAPY | Facility: CLINIC | Age: 70
Setting detail: THERAPIES SERIES
Discharge: HOME | End: 2025-01-20
Payer: MEDICARE

## 2025-01-20 PROCEDURE — 97112 NEUROMUSCULAR REEDUCATION: CPT

## 2025-01-20 PROCEDURE — 97110 THERAPEUTIC EXERCISES: CPT

## 2025-01-20 NOTE — FLOWSHEET NOTE
[] [facility]  Outpatient Rehabilitation &  Therapy  [address]  [phone]  [phone] [] [facility]  Outpatient Rehabilitation &  Therapy  [address]  P: [phone]  F: [phone] [] [facility]  Outpatient Rehabilitation &  Therapy  [address]  P: [phone]  F: [phone] [x] [facility]  Outpatient Rehabilitation &  Therapy  [address]  [phone]  [phone] [] [facility]  Outpatient Rehabilitation &  Therapy  [address]   P: [phone]  F: [phone]  [] [facility]  Outpatient Rehabilitation &  Therapy  [address]  P: [phone]  F: [phone] [] [facility]  Outpatient Rehabilitation &  Therapy  [address]  P: [phone]  F: [phone] [] [facility]  Outpatient Rehabilitation &  Therapy  [address]  P: [phone]  F: [phone] [] [facility]  Outpatient Rehabilitation &  Therapy  [address]  P: [phone]  F: [phone]  [] [facility] Outpatient Rehabilitation &  Therapy  [address]  P: [phone]  F: [phone]     Physical Therapy Daily Treatment Note    Date:  2025  Patient Name:  Ana Kang    :  1955  MRN: [id number]  Physician: Liliam Edouard, SJ - CNP                                     Insurance: : 1.Medicare; Antonio yr; vs based on med nec; no auth req; 20% coins; 257/0 met ded; no OOP limit  2.AARP Medicare Supp; [place] yr; follows primary; no pt resp due   Medical Diagnosis: Frequent falls [R29.6], Multiple sclerosis (HCC) [G35], Bilateral leg weakness [R29.898]    Rehab Codes: Frequent falls [R29.6],[G35], Bilateral leg weakness [R29.898]   Onset Date: 2020                                  Next 's appt:

## 2025-01-22 ENCOUNTER — HOSPITAL ENCOUNTER (OUTPATIENT)
Dept: PHYSICAL THERAPY | Facility: CLINIC | Age: 70
Setting detail: THERAPIES SERIES
Discharge: HOME | End: 2025-01-22
Payer: MEDICARE

## 2025-01-22 ENCOUNTER — HOSPITAL ENCOUNTER (OUTPATIENT)
Age: 70
Discharge: HOME | End: 2025-01-24
Payer: MEDICARE

## 2025-01-22 VITALS — WEIGHT: 225 LBS | HEIGHT: 61 IN | BODY MASS INDEX: 42.48 KG/M2

## 2025-01-22 DIAGNOSIS — I51.7 ENLARGED HEART: ICD-10-CM

## 2025-01-22 LAB
ECHO AO ASC DIAM: 4 CM
ECHO AO ASCENDING AORTA INDEX: 2.01 CM/M2
ECHO AO ROOT DIAM: 3.2 CM
ECHO AO ROOT INDEX: 1.61 CM/M2
ECHO AV MEAN GRADIENT: 4 MMHG
ECHO AV MEAN VELOCITY: 0.9 M/S
ECHO AV PEAK GRADIENT: 8 MMHG
ECHO AV PEAK VELOCITY: 1.4 M/S
ECHO AV VELOCITY RATIO: 0.79
ECHO AV VTI: 34.4 CM
ECHO BSA: 2.1 M2
ECHO EST RA PRESSURE: 5 MMHG
ECHO IVC PROX: 2 CM
ECHO LA AREA 2C: 19.2 CM2
ECHO LA AREA 4C: 15.1 CM2
ECHO LA DIAMETER INDEX: 2.06 CM/M2
ECHO LA DIAMETER: 4.1 CM
ECHO LA MAJOR AXIS: 5.6 CM
ECHO LA MINOR AXIS: 5.8 CM
ECHO LA TO AORTIC ROOT RATIO: 1.28
ECHO LA VOL BP: 40 ML (ref 22–52)
ECHO LA VOL MOD A2C: 52 ML (ref 22–52)
ECHO LA VOL MOD A4C: 32 ML (ref 22–52)
ECHO LA VOL/BSA BIPLANE: 20 ML/M2 (ref 16–34)
ECHO LA VOLUME INDEX MOD A2C: 26 ML/M2 (ref 16–34)
ECHO LA VOLUME INDEX MOD A4C: 16 ML/M2 (ref 16–34)
ECHO LV E' LATERAL VELOCITY: 11.5 CM/S
ECHO LV E' SEPTAL VELOCITY: 9.25 CM/S
ECHO LV EDV A2C: 66 ML
ECHO LV EDV A4C: 84 ML
ECHO LV EDV INDEX A4C: 42 ML/M2
ECHO LV EDV NDEX A2C: 33 ML/M2
ECHO LV EJECTION FRACTION A2C: 63 %
ECHO LV EJECTION FRACTION A4C: 63 %
ECHO LV EJECTION FRACTION BIPLANE: 62 % (ref 55–100)
ECHO LV ESV A2C: 24 ML
ECHO LV ESV A4C: 31 ML
ECHO LV ESV INDEX A2C: 12 ML/M2
ECHO LV ESV INDEX A4C: 16 ML/M2
ECHO LV FRACTIONAL SHORTENING: 33 % (ref 28–44)
ECHO LV INTERNAL DIMENSION DIASTOLE INDEX: 2.16 CM/M2
ECHO LV INTERNAL DIMENSION DIASTOLIC: 4.3 CM (ref 3.9–5.3)
ECHO LV INTERNAL DIMENSION SYSTOLIC INDEX: 1.46 CM/M2
ECHO LV INTERNAL DIMENSION SYSTOLIC: 2.9 CM
ECHO LV IVSD: 1 CM (ref 0.6–0.9)
ECHO LV MASS 2D: 142.5 G (ref 67–162)
ECHO LV MASS INDEX 2D: 71.6 G/M2 (ref 43–95)
ECHO LV POSTERIOR WALL DIASTOLIC: 1 CM (ref 0.6–0.9)
ECHO LV RELATIVE WALL THICKNESS RATIO: 0.47
ECHO LVOT AV VTI INDEX: 0.72
ECHO LVOT MEAN GRADIENT: 2 MMHG
ECHO LVOT PEAK GRADIENT: 5 MMHG
ECHO LVOT PEAK VELOCITY: 1.1 M/S
ECHO LVOT VTI: 24.6 CM
ECHO MV A VELOCITY: 0.76 M/S
ECHO MV E DECELERATION TIME (DT): 172 MS
ECHO MV E VELOCITY: 0.92 M/S
ECHO MV E/A RATIO: 1.21
ECHO MV E/E' LATERAL: 8
ECHO MV E/E' RATIO (AVERAGED): 8.97
ECHO MV E/E' SEPTAL: 9.95
ECHO MV LVOT VTI INDEX: 1.35
ECHO MV MAX VELOCITY: 1 M/S
ECHO MV MEAN GRADIENT: 1 MMHG
ECHO MV MEAN VELOCITY: 0.5 M/S
ECHO MV PEAK GRADIENT: 4 MMHG
ECHO MV VTI: 33.2 CM
ECHO PV MAX VELOCITY: 0.8 M/S
ECHO PV PEAK GRADIENT: 2 MMHG
ECHO PVEIN A DURATION: 201 MS
ECHO PVEIN A VELOCITY: 0.3 M/S
ECHO PVEIN PEAK D VELOCITY: 0.4 M/S
ECHO PVEIN PEAK S VELOCITY: 0.5 M/S
ECHO PVEIN S/D RATIO: 1.3 NO UNITS
ECHO RA AREA 4C: 11.9 CM2
ECHO RA END SYSTOLIC VOLUME APICAL 4 CHAMBER INDEX BSA: 13 ML/M2
ECHO RA VOLUME: 26 ML
ECHO RIGHT VENTRICULAR SYSTOLIC PRESSURE (RVSP): 38 MMHG
ECHO RV BASAL DIMENSION: 3.2 CM
ECHO RV FREE WALL PEAK S': 11.6 CM/S
ECHO RV TAPSE: 2.2 CM (ref 1.7–?)
ECHO TV PEAK GRADIENT: 2 MMHG
ECHO TV REGURGITANT MAX VELOCITY: 2.88 M/S
ECHO TV REGURGITANT PEAK GRADIENT: 33 MMHG

## 2025-01-22 PROCEDURE — 93306 TTE W/DOPPLER COMPLETE: CPT

## 2025-01-22 PROCEDURE — 93306 TTE W/DOPPLER COMPLETE: CPT | Performed by: INTERNAL MEDICINE

## 2025-01-22 PROCEDURE — 97110 THERAPEUTIC EXERCISES: CPT

## 2025-01-22 NOTE — FLOWSHEET NOTE
[] [facility]  Outpatient Rehabilitation &  Therapy  [address]  [phone]  [phone] [] [facility]  Outpatient Rehabilitation &  Therapy  [address]  P: [phone]  F: [phone] [] [facility]  Outpatient Rehabilitation &  Therapy  [address]  P: [phone]  F: [phone] [x] [facility]  Outpatient Rehabilitation &  Therapy  [address]  [phone]  [phone] [] [facility]  Outpatient Rehabilitation &  Therapy  [address]   P: [phone]  F: [phone]  [] [facility]  Outpatient Rehabilitation &  Therapy  [address]  P: [phone]  F: [phone] [] [facility]  Outpatient Rehabilitation &  Therapy  [address]  P: [phone]  F: [phone] [] [facility]  Outpatient Rehabilitation &  Therapy  [address]  P: [phone]  F: [phone] [] [facility]  Outpatient Rehabilitation &  Therapy  [address]  P: [phone]  F: [phone]  [] [facility] Outpatient Rehabilitation &  Therapy  [address]  P: [phone]  F: [phone]     Physical Therapy Daily Treatment Note    Date:  2025  Patient Name:  Ana Kang    :  1955  MRN: [id number]  Physician: Liliam Edouard, SJ - CNP                                     Insurance: : 1.Medicare; Antonio yr; vs based on med nec; no auth req; 20% coins; 257/0 met ded; no OOP limit  2.AARP Medicare Supp; [place] yr; follows primary; no pt resp due   Medical Diagnosis: Frequent falls [R29.6], Multiple sclerosis (HCC) [G35], Bilateral leg weakness [R29.898]    Rehab Codes: Frequent falls [R29.6],[G35], Bilateral leg weakness [R29.898]   Onset Date: 2020                                  Next 's appt:

## 2025-01-23 ENCOUNTER — APPOINTMENT (OUTPATIENT)
Dept: PHYSICAL THERAPY | Facility: CLINIC | Age: 70
End: 2025-01-23
Payer: MEDICARE

## 2025-01-24 DIAGNOSIS — G25.2 RUBRAL TREMOR: ICD-10-CM

## 2025-01-24 DIAGNOSIS — G35 MULTIPLE SCLEROSIS (HCC): ICD-10-CM

## 2025-01-24 RX ORDER — PRIMIDONE 50 MG/1
100 TABLET ORAL 2 TIMES DAILY
Qty: 360 TABLET | Refills: 0 | Status: SHIPPED | OUTPATIENT
Start: 2025-01-24 | End: 2025-04-24

## 2025-01-24 RX ORDER — DIROXIMEL FUMARATE 231 MG/1
2 CAPSULE ORAL 2 TIMES DAILY
Qty: 120 CAPSULE | Refills: 5 | Status: SHIPPED | OUTPATIENT
Start: 2025-01-24

## 2025-01-24 NOTE — TELEPHONE ENCOUNTER
Patient  called stating they had to switch pharmamcy due to insurance at beginning of year Pharmacy requesting refill of Vumerity 231 MG CPDR .      Medication active on med list yes      Date of last Rx: 12/06/2024 with 5 refills          verified by AVRSHA COOPER      Date of last appointment 12/12/2024    Next Visit Date:  Visit date not found    Pharmacy requesting refill of Primidone 50 MG Tablet .      Medication active on med list yes      Date of last Rx: 09/12/2024 with 0 refills          verified by VARSHA COOPER      Date of last appointment 12/12/2024    Next Visit Date:  Visit date not found

## 2025-01-27 ENCOUNTER — APPOINTMENT (OUTPATIENT)
Dept: PHYSICAL THERAPY | Facility: CLINIC | Age: 70
End: 2025-01-27
Payer: MEDICARE

## 2025-01-28 ENCOUNTER — HOSPITAL ENCOUNTER (OUTPATIENT)
Dept: PHYSICAL THERAPY | Facility: CLINIC | Age: 70
Setting detail: THERAPIES SERIES
Discharge: HOME | End: 2025-01-28
Payer: MEDICARE

## 2025-01-28 PROCEDURE — 97110 THERAPEUTIC EXERCISES: CPT

## 2025-01-28 NOTE — FLOWSHEET NOTE
[] [facility]  Outpatient Rehabilitation &  Therapy  [address]  [phone]  [phone] [] [facility]  Outpatient Rehabilitation &  Therapy  [address]  P: [phone]  F: [phone] [] [facility]  Outpatient Rehabilitation &  Therapy  [address]  P: [phone]  F: [phone] [x] [facility]  Outpatient Rehabilitation &  Therapy  [address]  [phone]  [phone] [] [facility]  Outpatient Rehabilitation &  Therapy  [address]   P: [phone]  F: [phone]  [] [facility]  Outpatient Rehabilitation &  Therapy  [address]  P: [phone]  F: [phone] [] [facility]  Outpatient Rehabilitation &  Therapy  [address]  P: [phone]  F: [phone] [] [facility]  Outpatient Rehabilitation &  Therapy  [address]  P: [phone]  F: [phone] [] [facility]  Outpatient Rehabilitation &  Therapy  [address]  P: [phone]  F: [phone]  [] [facility] Outpatient Rehabilitation &  Therapy  [address]  P: [phone]  F: [phone]     Physical Therapy Daily Treatment Note    Date:  2025  Patient Name:  Ana Kang    :  1955  MRN: [id number]  Physician: Liliam Edouard, SJ - CNP                                     Insurance: : 1.Medicare; Antonio yr; vs based on med nec; no auth req; 20% coins; 257/0 met ded; no OOP limit  2.AARP Medicare Supp; [place] yr; follows primary; no pt resp due   Medical Diagnosis: Frequent falls [R29.6], Multiple sclerosis (HCC) [G35], Bilateral leg weakness [R29.898]    Rehab Codes: Frequent falls [R29.6],[G35], Bilateral leg weakness [R29.898]   Onset Date: 2020                                  Next 's appt:

## 2025-01-30 ENCOUNTER — APPOINTMENT (OUTPATIENT)
Dept: PHYSICAL THERAPY | Facility: CLINIC | Age: 70
End: 2025-01-30
Payer: MEDICARE

## 2025-01-31 ENCOUNTER — HOSPITAL ENCOUNTER (OUTPATIENT)
Dept: PHYSICAL THERAPY | Facility: CLINIC | Age: 70
Setting detail: THERAPIES SERIES
Discharge: HOME | End: 2025-01-31
Payer: MEDICARE

## 2025-01-31 PROCEDURE — 97110 THERAPEUTIC EXERCISES: CPT

## 2025-01-31 NOTE — FLOWSHEET NOTE
[] [facility]  Outpatient Rehabilitation &  Therapy  [address]  [phone]  [phone] [] [facility]  Outpatient Rehabilitation &  Therapy  [address]  P: [phone]  F: [phone] [] [facility]  Outpatient Rehabilitation &  Therapy  [address]  P: [phone]  F: [phone] [x] [facility]  Outpatient Rehabilitation &  Therapy  [address]  [phone]  [phone] [] [facility]  Outpatient Rehabilitation &  Therapy  [address]   P: [phone]  F: [phone]  [] [facility]  Outpatient Rehabilitation &  Therapy  [address]  P: [phone]  F: [phone] [] [facility]  Outpatient Rehabilitation &  Therapy  [address]  P: [phone]  F: [phone] [] [facility]  Outpatient Rehabilitation &  Therapy  [address]  P: [phone]  F: [phone] [] [facility]  Outpatient Rehabilitation &  Therapy  [address]  P: [phone]  F: [phone]  [] [facility] Outpatient Rehabilitation &  Therapy  [address]  P: [phone]  F: [phone]     Physical Therapy Daily Treatment Note    Date:  2025  Patient Name:  Ana Kang    :  1955  MRN: [id number]  Physician: Liliam Edouard, SJ - CNP                                     Insurance: : 1.Medicare; [place] yr; vs based on med nec; no auth req; 20% coins; 257/0 met ded; no OOP limit  2.AARP Medicare Supp; [place] yr; follows primary; no pt resp due   Medical Diagnosis: Frequent falls [R29.6], Multiple sclerosis (HCC) [G35], Bilateral leg weakness [R29.898]    Rehab Codes: Frequent falls [R29.6],[G35], Bilateral leg weakness [R29.898]   Onset Date: 2020                                  Next 's appt:

## 2025-02-04 ENCOUNTER — HOSPITAL ENCOUNTER (OUTPATIENT)
Dept: PHYSICAL THERAPY | Facility: CLINIC | Age: 70
Setting detail: THERAPIES SERIES
Discharge: HOME | End: 2025-02-04
Payer: MEDICARE

## 2025-02-04 PROCEDURE — 97110 THERAPEUTIC EXERCISES: CPT

## 2025-02-04 PROCEDURE — 97112 NEUROMUSCULAR REEDUCATION: CPT

## 2025-02-05 NOTE — FLOWSHEET NOTE
[] [facility]  Outpatient Rehabilitation &  Therapy  [address]  [phone]  [phone] [] [facility]  Outpatient Rehabilitation &  Therapy  [address]  P: [phone]  F: [phone] [] [facility]  Outpatient Rehabilitation &  Therapy  [address]  P: [phone]  F: [phone] [x] [facility]  Outpatient Rehabilitation &  Therapy  [address]  [phone]  [phone] [] [facility]  Outpatient Rehabilitation &  Therapy  [address]   P: [phone]  F: [phone]  [] [facility]  Outpatient Rehabilitation &  Therapy  [address]  P: [phone]  F: [phone] [] [facility]  Outpatient Rehabilitation &  Therapy  [address]  P: [phone]  F: [phone] [] [facility]  Outpatient Rehabilitation &  Therapy  [address]  P: [phone]  F: [phone] [] [facility]  Outpatient Rehabilitation &  Therapy  [address]  P: [phone]  F: [phone]  [] [facility] Outpatient Rehabilitation &  Therapy  [address]  P: [phone]  F: [phone]     Physical Therapy Daily Treatment Note    Date:  2025  Patient Name:  Ana Kang    :  1955  MRN: [id number]  Physician: Liliam Edouard, SJ - CNP                                     Insurance: : 1.Medicare; [place] yr; vs based on med nec; no auth req; 20% coins; 257/0 met ded; no OOP limit  2.AARP Medicare Supp; [place] yr; follows primary; no pt resp due   Medical Diagnosis: Frequent falls [R29.6], Multiple sclerosis (HCC) [G35], Bilateral leg weakness [R29.898]    Rehab Codes: Frequent falls [R29.6],[G35], Bilateral leg weakness [R29.898]   Onset Date: 2020                                  Next 's appt:

## 2025-02-07 ENCOUNTER — HOSPITAL ENCOUNTER (OUTPATIENT)
Dept: PHYSICAL THERAPY | Facility: CLINIC | Age: 70
Setting detail: THERAPIES SERIES
Discharge: HOME | End: 2025-02-07
Payer: MEDICARE

## 2025-02-07 PROCEDURE — 97110 THERAPEUTIC EXERCISES: CPT

## 2025-02-07 NOTE — FLOWSHEET NOTE
improved wbing through L shoulder. Completed 4x modified quadriped with sig improvement. Attempted standing therex, however, patient too fatigued to complete. Printed out pictures of wrist and ankle weights for patient to buy for home use.     [] No change.     [] Other:  [x] Patient would continue to benefit from skilled physical therapy services in order to: The patient is a 69 year old female with a chief complaint of LE weakness and frequent falls and signs and symproms consistent with a diagnosis of MS.  She present with decreased AROM, LE weakness, reduced balance which results in functional limitation with bathing, dressing, cooking, cleaning, transfers and ambulation.  She will benefit from skilled physical therapy to address above limitations.     STG: (to be met in 10 treatments)  ? Function: Patient will ambulate >100' with Rolling walker or rollator.  Patient to be independent with home exercise program as demonstrated by performance with correct form without cues.  Demonstrate Knowledge of fall prevention  LTG: (to be met in 20 treatments)  Patient will ascend and descend 2 steps with CGA to allow her to safely get in and out of her home  Patient will perform  5x sit to  <30 sec with moderate use of hands  Patient will go 4+ weeks without a reported fall.  Patient/family with have strategy in place to transfer from floor to chair.    Pt. Education:  [x] Yes  [] No  [x] Reviewed Prior HEP/Ed  Method of Education: [x] Verbal  [] Demo  [] Written  Access Code: [email or web address]  URL: [email or web address]  Date: 01/17/2025  Prepared by: Shaggy Yañez    Exercises  - Seated March  - 2 x daily - 7 x weekly - 2 sets - 5 reps  - Sit to Stand with Counter Support  - 1 x daily - 7 x weekly - 2 sets - 5 reps  - Standing March with Counter Support  - 1 x daily - 7 x weekly - 2 sets - 5 reps  - Standing Hip Abduction with Counter Support  - 1 x daily - 7 x weekly - 2 sets - 5 reps  - Heel Raises with Counter

## 2025-02-12 ENCOUNTER — HOSPITAL ENCOUNTER (OUTPATIENT)
Dept: PHYSICAL THERAPY | Facility: CLINIC | Age: 70
Setting detail: THERAPIES SERIES
Discharge: HOME | End: 2025-02-12
Payer: MEDICARE

## 2025-02-12 PROCEDURE — 97110 THERAPEUTIC EXERCISES: CPT

## 2025-02-12 NOTE — FLOWSHEET NOTE
weekly - 2 sets - 5 reps  - Sit to Stand with Counter Support  - 1 x daily - 7 x weekly - 2 sets - 5 reps  - Standing March with Counter Support  - 1 x daily - 7 x weekly - 2 sets - 5 reps  - Standing Hip Abduction with Counter Support  - 1 x daily - 7 x weekly - 2 sets - 5 reps  - Heel Raises with Counter Support  - 1 x daily - 7 x weekly - 2 sets - 10 reps  - Seated Hip Abduction  - 1 x daily - 7 x weekly - 3 sets - 10 reps  - Seated Toe Raise  - 1 x daily - 7 x weekly - 3 sets - 10 reps  - Seated UE Activity: Weight-Bearing Side-To-Side Shifts  - 1 x daily - 7 x weekly - 3 sets - 10 reps  Comprehension of Education:  [x] Verbalizes understanding.  [x] Demonstrates understanding.  [] Needs review.  [x] Demonstrates/verbalizes HEP/Ed previously given.     Plan: [x] Continue current frequency toward long and short term goals.    [x] Specific Instructions for subsequent treatments: TKE, quad strength, possible tband wrap to reduce hyper extension      Time In: 10:00a            Time Out: 11:00a    Electronically signed by:  SAUD JONES PTA

## 2025-02-14 ENCOUNTER — HOSPITAL ENCOUNTER (OUTPATIENT)
Dept: PHYSICAL THERAPY | Facility: CLINIC | Age: 70
Setting detail: THERAPIES SERIES
Discharge: HOME | End: 2025-02-14
Payer: MEDICARE

## 2025-02-14 PROCEDURE — 97110 THERAPEUTIC EXERCISES: CPT

## 2025-02-14 NOTE — FLOWSHEET NOTE
[] [facility]  Outpatient Rehabilitation &  Therapy  [address]  [phone]  [phone] [] [facility]  Outpatient Rehabilitation &  Therapy  [address]  P: [phone]  F: [phone] [] [facility]  Outpatient Rehabilitation &  Therapy  [address]  P: [phone]  F: [phone] [x] [facility]  Outpatient Rehabilitation &  Therapy  [address]  [phone]  [phone] [] [facility]  Outpatient Rehabilitation &  Therapy  [address]   P: [phone]  F: [phone]  [] [facility]  Outpatient Rehabilitation &  Therapy  [address]  P: [phone]  F: [phone] [] [facility]  Outpatient Rehabilitation &  Therapy  [address]  P: [phone]  F: [phone] [] [facility]  Outpatient Rehabilitation &  Therapy  [address]  P: [phone]  F: [phone] [] [facility]  Outpatient Rehabilitation &  Therapy  [address]  P: [phone]  F: [phone]  [] [facility] Outpatient Rehabilitation &  Therapy  [address]  P: [phone]  F: [phone]     Physical Therapy Daily Treatment Note    Date:  2025  Patient Name:  Ana Kang    :  1955  MRN: [id number]  Physician: Liliam Edouard, JS - CNP                                     Insurance: : 1.Medicare; Antonio yr; vs based on med nec; no auth req; 20% coins; 257/0 met ded; no OOP limit  2.AARP Medicare Supp; [place] yr; follows primary; no pt resp due   Medical Diagnosis: Frequent falls [R29.6], Multiple sclerosis (HCC) [G35], Bilateral leg weakness [R29.898]    Rehab Codes: Frequent falls [R29.6],[G35], Bilateral leg weakness [R29.898]   Onset Date: 2020                                  Next 's appt:

## 2025-02-19 ENCOUNTER — HOSPITAL ENCOUNTER (OUTPATIENT)
Dept: PHYSICAL THERAPY | Facility: CLINIC | Age: 70
Setting detail: THERAPIES SERIES
Discharge: HOME | End: 2025-02-19
Payer: MEDICARE

## 2025-02-19 PROCEDURE — 97110 THERAPEUTIC EXERCISES: CPT

## 2025-02-19 NOTE — FLOWSHEET NOTE
[] [facility]  Outpatient Rehabilitation &  Therapy  [address]  [phone]  [phone] [] [facility]  Outpatient Rehabilitation &  Therapy  [address]  P: [phone]  F: [phone] [] [facility]  Outpatient Rehabilitation &  Therapy  [address]  P: [phone]  F: [phone] [x] [facility]  Outpatient Rehabilitation &  Therapy  [address]  [phone]  [phone] [] [facility]  Outpatient Rehabilitation &  Therapy  [address]   P: [phone]  F: [phone]  [] [facility]  Outpatient Rehabilitation &  Therapy  [address]  P: [phone]  F: [phone] [] [facility]  Outpatient Rehabilitation &  Therapy  [address]  P: [phone]  F: [phone] [] [facility]  Outpatient Rehabilitation &  Therapy  [address]  P: [phone]  F: [phone] [] [facility]  Outpatient Rehabilitation &  Therapy  [address]  P: [phone]  F: [phone]  [] [facility] Outpatient Rehabilitation &  Therapy  [address]  P: [phone]  F: [phone]     Physical Therapy Daily Treatment Note    Date:  2025  Patient Name:  Ana Kang    :  1955  MRN: [id number]  Physician: Liliam Edouard, SJ - CNP                                     Insurance: : 1.Medicare; Antonio yr; vs based on med nec; no auth req; 20% coins; 257/0 met ded; no OOP limit  2.AARP Medicare Supp; Antonio yr; follows primary; no pt resp due   Medical Diagnosis: Frequent falls [R29.6], Multiple sclerosis (HCC) [G35], Bilateral leg weakness [R29.898]    Rehab Codes: Frequent falls [R29.6],[G35], Bilateral leg weakness [R29.898]   Onset Date: 2020                                  Next 's appt:

## 2025-02-21 ENCOUNTER — HOSPITAL ENCOUNTER (OUTPATIENT)
Dept: PHYSICAL THERAPY | Facility: CLINIC | Age: 70
Setting detail: THERAPIES SERIES
Discharge: HOME | End: 2025-02-21
Payer: MEDICARE

## 2025-02-21 PROCEDURE — 97110 THERAPEUTIC EXERCISES: CPT

## 2025-02-21 NOTE — FLOWSHEET NOTE
[] [facility]  Outpatient Rehabilitation &  Therapy  [address]  [phone]  [phone] [] [facility]  Outpatient Rehabilitation &  Therapy  [address]  P: [phone]  F: [phone] [] [facility]  Outpatient Rehabilitation &  Therapy  [address]  P: [phone]  F: [phone] [x] [facility]  Outpatient Rehabilitation &  Therapy  [address]  [phone]  [phone] [] [facility]  Outpatient Rehabilitation &  Therapy  [address]   P: [phone]  F: [phone]  [] [facility]  Outpatient Rehabilitation &  Therapy  [address]  P: [phone]  F: [phone] [] [facility]  Outpatient Rehabilitation &  Therapy  [address]  P: [phone]  F: [phone] [] [facility]  Outpatient Rehabilitation &  Therapy  [address]  P: [phone]  F: [phone] [] [facility]  Outpatient Rehabilitation &  Therapy  [address]  P: [phone]  F: [phone]  [] [facility] Outpatient Rehabilitation &  Therapy  [address]  P: [phone]  F: [phone]     Physical Therapy Daily Treatment Note    Date:  2025  Patient Name:  Ana Kang    :  1955  MRN: [id number]  Physician: Liliam Edouard, SJ - CNP                                     Insurance: : 1.Medicare; Antonio yr; vs based on med nec; no auth req; 20% coins; 257/0 met ded; no OOP limit  2.AARP Medicare Supp; [place] yr; follows primary; no pt resp due   Medical Diagnosis: Frequent falls [R29.6], Multiple sclerosis (HCC) [G35], Bilateral leg weakness [R29.898]    Rehab Codes: Frequent falls [R29.6],[G35], Bilateral leg weakness [R29.898]   Onset Date: 2020                                  Next 's appt:  no

## 2025-02-26 ENCOUNTER — HOSPITAL ENCOUNTER (OUTPATIENT)
Dept: PHYSICAL THERAPY | Facility: CLINIC | Age: 70
Setting detail: THERAPIES SERIES
End: 2025-02-26
Payer: MEDICARE

## 2025-02-28 ENCOUNTER — HOSPITAL ENCOUNTER (OUTPATIENT)
Dept: PHYSICAL THERAPY | Facility: CLINIC | Age: 70
Setting detail: THERAPIES SERIES
End: 2025-02-28
Payer: MEDICARE

## 2025-02-28 NOTE — FLOWSHEET NOTE
[] [facility]  Outpatient Rehabilitation &  Therapy  [address]    [phone]  F: [phone]   [] [facility]  Outpatient Rehabilitation &  Therapy  [address]  P: [phone]  F: [phone]  [] [facility]  Outpatient Rehabilitation &  Therapy  [address]  P: [phone]  F: [phone] [x] [facility]  Outpatient Rehabilitation &  Therapy  [address]  P: [phone]  F: [phone]  [] [facility]  Outpatient Rehabilitation &  Therapy  [address]   P: [phone]  F: [phone]   [] [facility]   Outpatient Rehabilitation & Therapy  [address]  P: [phone]   F: [phone]     Physical Therapy Cancel/No Show note    Date: 2025  Patient: Ana Kang  : 1955  MRN: [id number]    Cancels/No Shows to date:     For today's appointment patient:    [x]  Cancelled    [] Rescheduled appointment    [] No-show     Reason given by patient:    []  Patient ill    []  Conflicting appointment    [] No transportation      [] Conflict with work    [] No reason given    [] Weather related    [] COVID-19    [x] Other:      Comments:  Pt's  called and stated she fell on her way to this appt. Next appt conf.      [x] Next appointment was confirmed    Electronically signed by: Kristi Alejandro

## 2025-03-05 ENCOUNTER — HOSPITAL ENCOUNTER (OUTPATIENT)
Dept: PHYSICAL THERAPY | Facility: CLINIC | Age: 70
Setting detail: THERAPIES SERIES
Discharge: HOME | End: 2025-03-05
Payer: MEDICARE

## 2025-03-05 PROCEDURE — 97110 THERAPEUTIC EXERCISES: CPT

## 2025-03-05 NOTE — FLOWSHEET NOTE
[] [facility]  Outpatient Rehabilitation &  Therapy  [address]  [phone]  [phone] [] [facility]  Outpatient Rehabilitation &  Therapy  [address]  P: [phone]  F: [phone] [] [facility]  Outpatient Rehabilitation &  Therapy  [address]  P: [phone]  F: [phone] [x] [facility]  Outpatient Rehabilitation &  Therapy  [address]  [phone]  [phone] [] [facility]  Outpatient Rehabilitation &  Therapy  [address]   P: [phone]  F: [phone]  [] [facility]  Outpatient Rehabilitation &  Therapy  [address]  P: [phone]  F: [phone] [] [facility]  Outpatient Rehabilitation &  Therapy  [address]  P: [phone]  F: [phone] [] [facility]  Outpatient Rehabilitation &  Therapy  [address]  P: [phone]  F: [phone] [] [facility]  Outpatient Rehabilitation &  Therapy  [address]  P: [phone]  F: [phone]  [] [facility] Outpatient Rehabilitation &  Therapy  [address]  P: [phone]  F: [phone]     Physical Therapy Daily Treatment Note    Date:  3/5/2025  Patient Name:  Ana Kang    :  1955  MRN: [id number]  Physician: Liliam Edouard, SJ - CNP                                     Insurance: : 1.Medicare; Antonio yr; vs based on med nec; no auth req; 20% coins; 257/0 met ded; no OOP limit  2.AARP Medicare Supp; [place] yr; follows primary; no pt resp due   Medical Diagnosis: Frequent falls [R29.6], Multiple sclerosis (HCC) [G35], Bilateral leg weakness [R29.898]    Rehab Codes: Frequent falls [R29.6],[G35], Bilateral leg weakness [R29.898]   Onset Date: 2020                                  Next 's appt:

## 2025-03-07 ENCOUNTER — HOSPITAL ENCOUNTER (OUTPATIENT)
Dept: PHYSICAL THERAPY | Facility: CLINIC | Age: 70
Setting detail: THERAPIES SERIES
Discharge: HOME | End: 2025-03-07
Payer: MEDICARE

## 2025-03-07 PROCEDURE — 97110 THERAPEUTIC EXERCISES: CPT

## 2025-03-07 PROCEDURE — 97530 THERAPEUTIC ACTIVITIES: CPT

## 2025-03-07 NOTE — FLOWSHEET NOTE
[] [facility]  Outpatient Rehabilitation &  Therapy  [address]  [phone]  [phone] [] [facility]  Outpatient Rehabilitation &  Therapy  [address]  P: [phone]  F: [phone] [] [facility]  Outpatient Rehabilitation &  Therapy  [address]  P: [phone]  F: [phone] [x] [facility]  Outpatient Rehabilitation &  Therapy  [address]  [phone]  [phone] [] [facility]  Outpatient Rehabilitation &  Therapy  [address]   P: [phone]  F: [phone]  [] [facility]  Outpatient Rehabilitation &  Therapy  [address]  P: [phone]  F: [phone] [] [facility]  Outpatient Rehabilitation &  Therapy  [address]  P: [phone]  F: [phone] [] [facility]  Outpatient Rehabilitation &  Therapy  [address]  P: [phone]  F: [phone] [] [facility]  Outpatient Rehabilitation &  Therapy  [address]  P: [phone]  F: [phone]  [] [facility] Outpatient Rehabilitation &  Therapy  [address]  P: [phone]  F: [phone]     Physical Therapy Daily Treatment Note    Date:  3/7/2025  Patient Name:  Ana Kang    :  1955  MRN: [id number]  Physician: Liliam Edouard, SJ - CNP                                     Insurance: : 1.Medicare; Antonio yr; vs based on med nec; no auth req; 20% coins; 257/0 met ded; no OOP limit  2.AARP Medicare Supp; [place] yr; follows primary; no pt resp due   Medical Diagnosis: Frequent falls [R29.6], Multiple sclerosis (HCC) [G35], Bilateral leg weakness [R29.898]    Rehab Codes: Frequent falls [R29.6],[G35], Bilateral leg weakness [R29.898]   Onset Date: 2020                                  Next 's appt:

## 2025-03-12 ENCOUNTER — HOSPITAL ENCOUNTER (OUTPATIENT)
Dept: PHYSICAL THERAPY | Facility: CLINIC | Age: 70
Setting detail: THERAPIES SERIES
Discharge: HOME | End: 2025-03-12
Payer: MEDICARE

## 2025-03-12 PROCEDURE — 97530 THERAPEUTIC ACTIVITIES: CPT

## 2025-03-12 PROCEDURE — 97110 THERAPEUTIC EXERCISES: CPT

## 2025-03-12 NOTE — FLOWSHEET NOTE
[] [facility]  Outpatient Rehabilitation &  Therapy  [address]  [phone]  [phone] [] [facility]  Outpatient Rehabilitation &  Therapy  [address]  P: [phone]  F: [phone] [] [facility]  Outpatient Rehabilitation &  Therapy  [address]  P: [phone]  F: [phone] [x] [facility]  Outpatient Rehabilitation &  Therapy  [address]  [phone]  [phone] [] [facility]  Outpatient Rehabilitation &  Therapy  [address]   P: [phone]  F: [phone]  [] [facility]  Outpatient Rehabilitation &  Therapy  [address]  P: [phone]  F: [phone] [] [facility]  Outpatient Rehabilitation &  Therapy  [address]  P: [phone]  F: [phone] [] [facility]  Outpatient Rehabilitation &  Therapy  [address]  P: [phone]  F: [phone] [] [facility]  Outpatient Rehabilitation &  Therapy  [address]  P: [phone]  F: [phone]  [] [facility] Outpatient Rehabilitation &  Therapy  [address]  P: [phone]  F: [phone]     Physical Therapy Daily Treatment Note    Date:  3/12/2025  Patient Name:  Ana Kang    :  1955  MRN: [id number]  Physician: Liliam Edouard, SJ - CNP                                     Insurance: : 1.Medicare; Antonio yr; vs based on med nec; no auth req; 20% coins; 257/0 met ded; no OOP limit  2.AARP Medicare Supp; [place] yr; follows primary; no pt resp due   Medical Diagnosis: Frequent falls [R29.6], Multiple sclerosis (HCC) [G35], Bilateral leg weakness [R29.898]    Rehab Codes: Frequent falls [R29.6],[G35], Bilateral leg weakness [R29.898]   Onset Date: 2020                                  Next 's appt:

## 2025-03-12 NOTE — PROGRESS NOTES
[facility] NEUROLOGY  [address]  [facility] [address]  Dept: [phone]    PATIENT NAME: Ana Kang  PATIENT MRN: [id number]  PRIMARY CARE PHYSICIAN: Liliam Edouard, APRN - CNP    History     Ana Kang is a 69 y.o. female who I initially saw in consultation on 10/5/2023. Her history is summarized as follows:      Ana Kang has a history of Crohn's disease  as well.  She initially developed neurological symptoms when she was 27 years old.  She became numb on the left side including the lower face, arm, and leg.  This gradually improved over time.  This was favored to be vascular in origin.  She had a 98% improvement to baseline over 6-8 weeks.       About 10 years later, she had a tingling sensation in the midsection.  This lasted about 6 weeks. About 7 years later, she had blurry vision OU.  She was seen by optometrist and sent for further evaluation for multiple sclerosis.   She believes she has CSF studies in the past and underwent an extensive work-up at this time.  Vision improved over 6 weeks. She was ultimately diagnosed with RRMS and started on Avonex.       She moved several times including a move from [place] and later to [place], [place]. She was switched from Avonex to dimethyl fumarate in the past due to preference for an oral medication. She was later switched to diroximel fumarate (Vumerity), presumably due to cost/insurance reasons.  She reports that she has been tolerating this medication well.  She denied significant diarrhea but also has a history of Crohn's disease.       Ms. Kang has a history of essential tremor, which has been difficult to control and refractory to several treatments.  She recalls that this began 10 years ago and is getting worse over time.  It is worse on the left and she also has a head tremor.  She feels that she has some benefit from primidone 50 mg PO BID, but has been on higher doses in the past (up

## 2025-03-13 ENCOUNTER — OFFICE VISIT (OUTPATIENT)
Dept: NEUROLOGY | Age: 70
End: 2025-03-13

## 2025-03-13 VITALS
HEIGHT: 61 IN | HEART RATE: 62 BPM | BODY MASS INDEX: 42.48 KG/M2 | WEIGHT: 225 LBS | SYSTOLIC BLOOD PRESSURE: 131 MMHG | DIASTOLIC BLOOD PRESSURE: 81 MMHG

## 2025-03-13 DIAGNOSIS — Z91.89 AT RISK FOR SIDE EFFECT OF MEDICATION: ICD-10-CM

## 2025-03-13 DIAGNOSIS — G35 MULTIPLE SCLEROSIS (HCC): Primary | ICD-10-CM

## 2025-03-13 DIAGNOSIS — G25.0 ESSENTIAL TREMOR: ICD-10-CM

## 2025-03-13 RX ORDER — TERIFLUNOMIDE 14 MG/1
14 TABLET, FILM COATED ORAL DAILY
Qty: 30 TABLET | Refills: 11 | Status: SHIPPED | OUTPATIENT
Start: 2025-03-20 | End: 2025-04-19

## 2025-03-13 RX ORDER — PROPRANOLOL HYDROCHLORIDE 60 MG/1
60 CAPSULE, EXTENDED RELEASE ORAL
COMMUNITY
Start: 2024-12-17

## 2025-03-13 RX ORDER — TERIFLUNOMIDE 7 MG/1
7 TABLET, FILM COATED ORAL DAILY
Qty: 7 TABLET | Refills: 0 | Status: SHIPPED | OUTPATIENT
Start: 2025-03-13 | End: 2025-03-20

## 2025-03-14 ENCOUNTER — HOSPITAL ENCOUNTER (OUTPATIENT)
Dept: PHYSICAL THERAPY | Facility: CLINIC | Age: 70
Setting detail: THERAPIES SERIES
Discharge: HOME | End: 2025-03-14
Payer: MEDICARE

## 2025-03-14 PROCEDURE — 97110 THERAPEUTIC EXERCISES: CPT

## 2025-03-14 NOTE — FLOWSHEET NOTE
[] [facility]  Outpatient Rehabilitation &  Therapy  [address]  [phone]  [phone] [] [facility]  Outpatient Rehabilitation &  Therapy  [address]  P: [phone]  F: [phone] [] [facility]  Outpatient Rehabilitation &  Therapy  [address]  P: [phone]  F: [phone] [x] [facility]  Outpatient Rehabilitation &  Therapy  [address]  [phone]  [phone] [] [facility]  Outpatient Rehabilitation &  Therapy  [address]   P: [phone]  F: [phone]  [] [facility]  Outpatient Rehabilitation &  Therapy  [address]  P: [phone]  F: [phone] [] [facility]  Outpatient Rehabilitation &  Therapy  [address]  P: [phone]  F: [phone] [] [facility]  Outpatient Rehabilitation &  Therapy  [address]  P: [phone]  F: [phone] [] [facility]  Outpatient Rehabilitation &  Therapy  [address]  P: [phone]  F: [phone]  [] [facility] Outpatient Rehabilitation &  Therapy  [address]  P: [phone]  F: [phone]     Physical Therapy Daily Treatment Note    Date:  3/14/2025  Patient Name:  Ana Kang    :  1955  MRN: [id number]  Physician: Liliam Edouard, SJ - CNP                                     Insurance: : 1.Medicare; Antonio yr; vs based on med nec; no auth req; 20% coins; 257/0 met ded; no OOP limit  2.AARP Medicare Supp; [place] yr; follows primary; no pt resp due   Medical Diagnosis: Frequent falls [R29.6], Multiple sclerosis (HCC) [G35], Bilateral leg weakness [R29.898]    Rehab Codes: Frequent falls [R29.6],[G35], Bilateral leg weakness [R29.898]   Onset Date: 2020                                  Next 's appt:

## 2025-03-18 ENCOUNTER — OFFICE VISIT (OUTPATIENT)
Dept: PODIATRY | Age: 70
End: 2025-03-18
Payer: MEDICARE

## 2025-03-18 VITALS — WEIGHT: 225 LBS | BODY MASS INDEX: 42.48 KG/M2 | HEIGHT: 61 IN

## 2025-03-18 DIAGNOSIS — M79.604 PAIN OF RIGHT LOWER EXTREMITY: ICD-10-CM

## 2025-03-18 DIAGNOSIS — B07.0 PLANTAR WART, RIGHT FOOT: Primary | ICD-10-CM

## 2025-03-18 DIAGNOSIS — G35 MULTIPLE SCLEROSIS (HCC): ICD-10-CM

## 2025-03-18 PROCEDURE — 3017F COLORECTAL CA SCREEN DOC REV: CPT | Performed by: PODIATRIST

## 2025-03-18 PROCEDURE — G8417 CALC BMI ABV UP PARAM F/U: HCPCS | Performed by: PODIATRIST

## 2025-03-18 PROCEDURE — 1123F ACP DISCUSS/DSCN MKR DOCD: CPT | Performed by: PODIATRIST

## 2025-03-18 PROCEDURE — G8399 PT W/DXA RESULTS DOCUMENT: HCPCS | Performed by: PODIATRIST

## 2025-03-18 PROCEDURE — 1036F TOBACCO NON-USER: CPT | Performed by: PODIATRIST

## 2025-03-18 PROCEDURE — G8427 DOCREV CUR MEDS BY ELIG CLIN: HCPCS | Performed by: PODIATRIST

## 2025-03-18 PROCEDURE — 1090F PRES/ABSN URINE INCON ASSESS: CPT | Performed by: PODIATRIST

## 2025-03-18 PROCEDURE — 1159F MED LIST DOCD IN RCRD: CPT | Performed by: PODIATRIST

## 2025-03-18 PROCEDURE — 17110 DESTRUCTION B9 LES UP TO 14: CPT | Performed by: PODIATRIST

## 2025-03-18 PROCEDURE — 99203 OFFICE O/P NEW LOW 30 MIN: CPT | Performed by: PODIATRIST

## 2025-03-18 PROCEDURE — 1125F AMNT PAIN NOTED PAIN PRSNT: CPT | Performed by: PODIATRIST

## 2025-03-18 NOTE — PROGRESS NOTES
[facility] PHYSICIANS [facility] PODIATRY  [address] [place], [place] [id number]  Dept: [phone]    NEW PATIENT PROGRESS NOTE  Date of patient's visit: 3/18/2025  Patient's Name:  Ana Kang YOB: 1955            Patient Care Team:  Liliam Edouard APRN - CNP as PCP - General (Family Nurse Practitioner)  Liliam Edouard APRN - CNP as PCP - [facility]  Kyaw Cortez MD as Consulting Physician (Neurology)  Christos Cam MD as Consulting Physician (Gastroenterology)        Chief Complaint   Patient presents with    New Patient     Establish care    Benign Neoplasm     Lesion right heel x 1 year         HPI:   Ana Kang is a 69 y.o. female who presents to the office today complaining of painful lesion on the right heel.  Symptoms began 1 year(s) ago. Patient relates pain is Present.  Pain is rated 1 out of 10 and is described as intermittent.  Treatments prior to today's visit include: none.  Currently denies F/C/N/V. Pt's primary care physician is Liliam Edouard APRN - CNP last seen January 7 2025     No Known Allergies    Past Medical History:   Diagnosis Date    Anxiety     Crohn's disease (HCC)     Dr. Cam [facility] last visit 8/2023    Diverticulosis     Fatigue     Hepatitis     pt denies    Hypertension     Impaired functional mobility, balance, gait, and endurance     Morbid obesity     Multiple sclerosis (HCC)     Dr. Luevano Neurology [facility]  10/5/2023    Osteopenia     Psoriasis     Tremor     Yes    Under care of team     [facility]    Urge incontinence     Dr. Villasenor Urology    Well adult exam     Liliam Edouard CNP [facility] last appt 9/11/2023       Prior to Admission medications    Medication Sig Start Date End Date Taking? Authorizing Provider   teriflunomide (AUBAGIO) 7 MG tablet Take 1 tablet by mouth daily for 7 days 3/13/25 3/20/25 Yes Danitza Luevano

## 2025-03-19 ENCOUNTER — HOSPITAL ENCOUNTER (OUTPATIENT)
Dept: PHYSICAL THERAPY | Facility: CLINIC | Age: 70
Setting detail: THERAPIES SERIES
Discharge: HOME | End: 2025-03-19
Payer: MEDICARE

## 2025-03-19 ENCOUNTER — HOSPITAL ENCOUNTER (OUTPATIENT)
Dept: OCCUPATIONAL THERAPY | Facility: CLINIC | Age: 70
Setting detail: THERAPIES SERIES
Discharge: HOME | End: 2025-03-19
Payer: MEDICARE

## 2025-03-19 PROCEDURE — 97110 THERAPEUTIC EXERCISES: CPT

## 2025-03-19 PROCEDURE — 97165 OT EVAL LOW COMPLEX 30 MIN: CPT

## 2025-03-19 NOTE — CONSULTS
Complexity      Treatment Charges: Mins Units Time In/Out   Evaluation  []  High  []  Moderate  [x]  Low  25  1    [x]  Ther Exercise 10 1    []  Manual Therapy      []  Ther Activities      []  Orthotic fit/train      []  Orthotic recheck      []        Total Billable time 35 min       Time In: 1305   Time out: 1340   Electronically signed by JAGUAR Weiss/L on 3/19/2025 at 12:48 PM    Physician Signature: _________________________ Date: _______________  By signing above or cosigning this note, I have reviewed this plan of care and certify a need for medically necessary rehabilitation services.     *PLEASE SIGN ABOVE AND FAX BACK ALL PAGES*

## 2025-03-19 NOTE — FLOWSHEET NOTE
throughout for soft knee bend to prevent knee hyper extension. Ended session early due to OT evaluation      [] Other:   [x] Patient would continue to benefit from skilled physical therapy services in order to: The patient is a 69 year old female with a chief complaint of LE weakness and frequent falls and signs and symproms consistent with a diagnosis of MS.  She present with decreased AROM, LE weakness, reduced balance which results in functional limitation with bathing, dressing, cooking, cleaning, transfers and ambulation.  She will benefit from skilled physical therapy to address above limitations.     STG: (to be met in 10 treatments)  ? Function: Patient will ambulate >100' with Rolling walker or rollator.  Patient to be independent with home exercise program as demonstrated by performance with correct form without cues.  Demonstrate Knowledge of fall prevention  LTG: (to be met in 20 treatments)  Patient will ascend and descend 2 steps with CGA to allow her to safely get in and out of her home  Patient will perform  5x sit to  <30 sec with moderate use of hands  Patient will go 4+ weeks without a reported fall.  Patient/family with have strategy in place to transfer from floor to chair.    Pt. Education:  [x] Yes  [] No  [x] Reviewed Prior HEP/Ed  Method of Education: [x] Verbal  [] Demo  [] Written  Access Code: [email or web address]  URL: [email or web address]  Date: 01/17/2025  Prepared by: Shaggy Yañez    Exercises  - Seated March  - 2 x daily - 7 x weekly - 2 sets - 5 reps  - Sit to Stand with Counter Support  - 1 x daily - 7 x weekly - 2 sets - 5 reps  - Standing March with Counter Support  - 1 x daily - 7 x weekly - 2 sets - 5 reps  - Standing Hip Abduction with Counter Support  - 1 x daily - 7 x weekly - 2 sets - 5 reps  - Heel Raises with Counter Support  - 1 x daily - 7 x weekly - 2 sets - 10 reps  - Seated Hip Abduction  - 1 x daily - 7 x weekly - 3 sets - 10 reps  - Seated Toe Raise  - 1 x

## 2025-03-20 ENCOUNTER — APPOINTMENT (OUTPATIENT)
Dept: OCCUPATIONAL THERAPY | Facility: CLINIC | Age: 70
End: 2025-03-20
Attending: PSYCHIATRY & NEUROLOGY
Payer: MEDICARE

## 2025-03-21 ENCOUNTER — HOSPITAL ENCOUNTER (OUTPATIENT)
Dept: PHYSICAL THERAPY | Facility: CLINIC | Age: 70
Setting detail: THERAPIES SERIES
Discharge: HOME | End: 2025-03-21
Payer: MEDICARE

## 2025-03-21 ENCOUNTER — APPOINTMENT (OUTPATIENT)
Dept: PHYSICAL THERAPY | Facility: CLINIC | Age: 70
End: 2025-03-21
Payer: MEDICARE

## 2025-03-21 PROCEDURE — 97110 THERAPEUTIC EXERCISES: CPT

## 2025-03-21 PROCEDURE — 97530 THERAPEUTIC ACTIVITIES: CPT

## 2025-03-21 NOTE — FLOWSHEET NOTE
[] [facility]  Outpatient Rehabilitation &  Therapy  [address]  [phone]  [phone] [] [facility]  Outpatient Rehabilitation &  Therapy  [address]  P: [phone]  F: [phone] [] [facility]  Outpatient Rehabilitation &  Therapy  [address]  P: [phone]  F: [phone] [x] [facility]  Outpatient Rehabilitation &  Therapy  [address]  [phone]  [phone] [] [facility]  Outpatient Rehabilitation &  Therapy  [address]   P: [phone]  F: [phone]  [] [facility]  Outpatient Rehabilitation &  Therapy  [address]  P: [phone]  F: [phone] [] [facility]  Outpatient Rehabilitation &  Therapy  [address]  P: [phone]  F: [phone] [] [facility]  Outpatient Rehabilitation &  Therapy  [address]  P: [phone]  F: [phone] [] [facility]  Outpatient Rehabilitation &  Therapy  [address]  P: [phone]  F: [phone]  [] [facility] Outpatient Rehabilitation &  Therapy  [address]  P: [phone]  F: [phone]     Physical Therapy Daily Treatment Note    Date:  3/21/2025  Patient Name:  Ana Kang    :  1955  MRN: [id number]  Physician: Liliam Edouard, SJ - CNP                                     Insurance: : 1.Medicare; [place] yr; vs based on med nec; no auth req; 20% coins; 257/0 met ded; no OOP limit  2.AARP Medicare Supp; [place] yr; follows primary; no pt resp due   Medical Diagnosis: Frequent falls [R29.6], Multiple sclerosis (HCC) [G35], Bilateral leg weakness [R29.898]    Rehab Codes: Frequent falls [R29.6],[G35], Bilateral leg weakness [R29.898]   Onset Date: 2020                                  Next 's appt:

## 2025-03-26 ENCOUNTER — HOSPITAL ENCOUNTER (OUTPATIENT)
Dept: OCCUPATIONAL THERAPY | Facility: CLINIC | Age: 70
Setting detail: THERAPIES SERIES
Discharge: HOME | End: 2025-03-26
Payer: MEDICARE

## 2025-03-26 ENCOUNTER — HOSPITAL ENCOUNTER (OUTPATIENT)
Dept: PHYSICAL THERAPY | Facility: CLINIC | Age: 70
Setting detail: THERAPIES SERIES
Discharge: HOME | End: 2025-03-26
Payer: MEDICARE

## 2025-03-26 PROCEDURE — 97110 THERAPEUTIC EXERCISES: CPT

## 2025-03-26 NOTE — FLOWSHEET NOTE
[] [facility]  Outpatient Rehabilitation &  Therapy  [address]  [phone]  F: [phone] [] [facility]  Outpatient Rehabilitation &  Therapy  [address]  P: [phone]  F: [phone] [x] [facility]  Outpatient Rehabilitation &  Therapy  [address]  P: [phone]  F: [phone] [] [facility]  Outpatient Rehabilitation &  Therapy  [address]   P: [phone]  F: [phone] [] [facility]   Outpatient Rehabilitation   & Therapy  [address]  P: [phone]   F: [phone]     Occupational Therapy Daily Treatment Note    Date:  3/26/2025  Patient Name:  Ana Kang    :  1955  MRN: [id number]  Physician: Liliam Edouard, SJ - BARNEY                                     Insurance: : 1.Medicare; [place] yr; vs based on med nec; no auth req; 20% coins; 257/0 met ded; no OOP limit  2.AARP Medicare Supp; [place] yr; follows primary; no pt resp due   Medical Diagnosis: Frequent falls [R29.6], Multiple sclerosis (HCC) [G35], Bilateral leg weakness [R29.898]    Rehab Codes: Frequent falls [R29.6],[G35], Bilateral leg weakness [R29.898]   Onset Date: 2020                                  Next 's appt: 3/13/25 Neurology  Visit# / total visits: ; Progress note for Medicare patient due at visit 20                                  Cancels/No Shows: 1/0    Subjective:    Pain:  [] Yes  [x] No Location:  N/A Pain Rating: (0-10 scale) 0/10  Pain altered Tx:  [x] No  [] Yes  Action:  Pt Comments:  I take oral meds for my MS, I did the MS shots in the past.      Precautions [] No  [x] Yes: MS, high fall risk, :  Surgery procedure and date:    Objective:  Today's Treatment:  Modalities:    Exercise Flow Sheet:  Exercise Reps/Time Weight/Level Comments Completed   Putty gripping 3 mins tan HEP Rev x   Intrinsic strengthening   3x10 red HEP Rev X   digigrip

## 2025-03-26 NOTE — FLOWSHEET NOTE
[] [facility]  Outpatient Rehabilitation &  Therapy  [address]  [phone]  [phone] [] [facility]  Outpatient Rehabilitation &  Therapy  [address]  P: [phone]  F: [phone] [] [facility]  Outpatient Rehabilitation &  Therapy  [address]  P: [phone]  F: [phone] [x] [facility]  Outpatient Rehabilitation &  Therapy  [address]  [phone]  [phone] [] [facility]  Outpatient Rehabilitation &  Therapy  [address]   P: [phone]  F: [phone]  [] [facility]  Outpatient Rehabilitation &  Therapy  [address]  P: [phone]  F: [phone] [] [facility]  Outpatient Rehabilitation &  Therapy  [address]  P: [phone]  F: [phone] [] [facility]  Outpatient Rehabilitation &  Therapy  [address]  P: [phone]  F: [phone] [] [facility]  Outpatient Rehabilitation &  Therapy  [address]  P: [phone]  F: [phone]  [] [facility] Outpatient Rehabilitation &  Therapy  [address]  P: [phone]  F: [phone]     Physical Therapy Daily Treatment Note    Date:  3/26/2025  Patient Name:  Ana Kang    :  1955  MRN: [id number]  Physician: Liliam Edouard, SJ - CNP                                     Insurance: : 1.Medicare; [place] yr; vs based on med nec; no auth req; 20% coins; 257/0 met ded; no OOP limit  2.AARP Medicare Supp; [place] yr; follows primary; no pt resp due   Medical Diagnosis: Frequent falls [R29.6], Multiple sclerosis (HCC) [G35], Bilateral leg weakness [R29.898]    Rehab Codes: Frequent falls [R29.6],[G35], Bilateral leg weakness [R29.898]   Onset Date: 2020                                  Next 's appt:

## 2025-03-27 DIAGNOSIS — G35 MULTIPLE SCLEROSIS (HCC): ICD-10-CM

## 2025-03-27 DIAGNOSIS — G25.2 RUBRAL TREMOR: ICD-10-CM

## 2025-03-27 RX ORDER — PRIMIDONE 50 MG/1
100 TABLET ORAL 2 TIMES DAILY
Qty: 360 TABLET | Refills: 1 | Status: SHIPPED | OUTPATIENT
Start: 2025-03-27 | End: 2025-06-25

## 2025-03-27 NOTE — TELEPHONE ENCOUNTER
Pharmacy requesting refill of primidone (MYSOLINE) 50 MG tablet      Medication active on med list yes      Date of last Rx: 1/24/2025 with 0 refills          verified by YOKO BELLO      Date of last appointment 3/13/2025      Next Visit Date:  6/17/2025      Please fill for Dr. Luevano as she is currently out of the office. Thank you.

## 2025-03-28 ENCOUNTER — HOSPITAL ENCOUNTER (OUTPATIENT)
Dept: PHYSICAL THERAPY | Facility: CLINIC | Age: 70
Setting detail: THERAPIES SERIES
Discharge: HOME | End: 2025-03-28
Payer: MEDICARE

## 2025-03-28 PROCEDURE — 97110 THERAPEUTIC EXERCISES: CPT

## 2025-03-28 NOTE — FLOWSHEET NOTE
[] [facility]  Outpatient Rehabilitation &  Therapy  [address]  [phone]  [phone] [] [facility]  Outpatient Rehabilitation &  Therapy  [address]  P: [phone]  F: [phone] [] [facility]  Outpatient Rehabilitation &  Therapy  [address]  P: [phone]  F: [phone] [x] [facility]  Outpatient Rehabilitation &  Therapy  [address]  [phone]  [phone] [] [facility]  Outpatient Rehabilitation &  Therapy  [address]   P: [phone]  F: [phone]  [] [facility]  Outpatient Rehabilitation &  Therapy  [address]  P: [phone]  F: [phone] [] [facility]  Outpatient Rehabilitation &  Therapy  [address]  P: [phone]  F: [phone] [] [facility]  Outpatient Rehabilitation &  Therapy  [address]  P: [phone]  F: [phone] [] [facility]  Outpatient Rehabilitation &  Therapy  [address]  P: [phone]  F: [phone]  [] [facility] Outpatient Rehabilitation &  Therapy  [address]  P: [phone]  F: [phone]     Physical Therapy Daily Treatment Note    Date:  3/28/2025  Patient Name:  Ana Kang    :  1955  MRN: [id number]  Physician: Liliam Edouard, SJ - CNP                                     Insurance: : 1.Medicare; [place] yr; vs based on med nec; no auth req; 20% coins; 257/0 met ded; no OOP limit  2.AARP Medicare Supp; [place] yr; follows primary; no pt resp due   Medical Diagnosis: Frequent falls [R29.6], Multiple sclerosis (HCC) [G35], Bilateral leg weakness [R29.898]    Rehab Codes: Frequent falls [R29.6],[G35], Bilateral leg weakness [R29.898]   Onset Date: 2020                                  Next 's appt:

## 2025-04-01 ENCOUNTER — OFFICE VISIT (OUTPATIENT)
Dept: PODIATRY | Age: 70
End: 2025-04-01

## 2025-04-01 VITALS — BODY MASS INDEX: 42.48 KG/M2 | WEIGHT: 225 LBS | HEIGHT: 61 IN

## 2025-04-01 DIAGNOSIS — I73.9 PVD (PERIPHERAL VASCULAR DISEASE): ICD-10-CM

## 2025-04-01 DIAGNOSIS — B35.1 DERMATOPHYTOSIS OF NAIL: Primary | ICD-10-CM

## 2025-04-01 DIAGNOSIS — L60.0 INGROWN NAIL: ICD-10-CM

## 2025-04-01 DIAGNOSIS — G35 MULTIPLE SCLEROSIS (HCC): ICD-10-CM

## 2025-04-01 DIAGNOSIS — B07.0 PLANTAR WART, RIGHT FOOT: ICD-10-CM

## 2025-04-01 NOTE — PROGRESS NOTES
Bilateral    Neurological: Sensation diminshed to light touch to level of digits, Bilateral.  Protective sensation intact 6/10 sites via 5.07/10g Monroe-Damon Monofilament, Bilateral.  negative Tinel's, Bilateral.  negative Valleix sign, Bilateral.      Integument: Warm, dry, supple, Bilateral.  Open lesion absent, Bilateral.  Interdigital maceration absent to web spaces 4, Bilateral.  Nails 1-5 left and 1-5 right thickened > 3.0 mm, dystrophic and crumbly, discolored with yellow subungual debris.  Fissures absent, Bilateral.         Asessment: Patient is a 69 y.o. female with:    Diagnosis Orders   1. Dermatophytosis of nail  DEBRIDEMENT OF NAILS, 6 OR MORE      2. Plantar wart, right foot        3. Multiple sclerosis (HCC)  DEBRIDEMENT OF NAILS, 6 OR MORE      4. PVD (peripheral vascular disease)  DEBRIDEMENT OF NAILS, 6 OR MORE      5. Ingrown nail  DEBRIDEMENT OF NAILS, 6 OR MORE          Plan: Patient examined and evaluated.  Current condition and treatment options discussed in detail.   The lesions were partially excised via 15 blade and Salinocaine was applied under occlusion.  The patient tolerated the procedure well and without complication.  Advised patient to use vasoline to the area after tomorrow to prevent surrounding tissue irritation.     Nails 1,2,3,4,5 Right and 1,2,3,4,5 Left were debrided and ground smooth with a dremmel.   The patient tolerated the procedure well without apparent complications.    All labs were reviewed and all imaging including the above findings were reviewed PRIOR to the patients arrival and with the patient today.    Previous patient encounter was reviewed.  Encounters from the patients other medical providers were reviewed and noted.   I personally interpreted the imaging and labs and discussed the results with the patient. Time was spent educating the patient and their families/caregivers on proper care of the feet and ankles.  All the above diagnosis were addressed at

## 2025-04-02 ENCOUNTER — HOSPITAL ENCOUNTER (OUTPATIENT)
Dept: PHYSICAL THERAPY | Facility: CLINIC | Age: 70
Setting detail: THERAPIES SERIES
Discharge: HOME | End: 2025-04-02
Payer: MEDICARE

## 2025-04-02 ENCOUNTER — HOSPITAL ENCOUNTER (OUTPATIENT)
Dept: PHYSICAL THERAPY | Facility: CLINIC | Age: 70
Setting detail: THERAPIES SERIES
End: 2025-04-02
Payer: MEDICARE

## 2025-04-02 ENCOUNTER — HOSPITAL ENCOUNTER (OUTPATIENT)
Dept: OCCUPATIONAL THERAPY | Facility: CLINIC | Age: 70
Setting detail: THERAPIES SERIES
Discharge: HOME | End: 2025-04-02
Payer: MEDICARE

## 2025-04-02 PROCEDURE — 97110 THERAPEUTIC EXERCISES: CPT

## 2025-04-02 PROCEDURE — 97530 THERAPEUTIC ACTIVITIES: CPT

## 2025-04-02 NOTE — FLOWSHEET NOTE
[] [facility]  Outpatient Rehabilitation &  Therapy  [address]  [phone]  [phone] [] [facility]  Outpatient Rehabilitation &  Therapy  [address]  P: [phone]  F: [phone] [] [facility]  Outpatient Rehabilitation &  Therapy  [address]  P: [phone]  F: [phone] [x] [facility]  Outpatient Rehabilitation &  Therapy  [address]  [phone]  [phone] [] [facility]  Outpatient Rehabilitation &  Therapy  [address]   P: [phone]  F: [phone]  [] [facility]  Outpatient Rehabilitation &  Therapy  [address]  P: [phone]  F: [phone] [] [facility]  Outpatient Rehabilitation &  Therapy  [address]  P: [phone]  F: [phone] [] [facility]  Outpatient Rehabilitation &  Therapy  [address]  P: [phone]  F: [phone] [] [facility]  Outpatient Rehabilitation &  Therapy  [address]  P: [phone]  F: [phone]  [] [facility] Outpatient Rehabilitation &  Therapy  [address]  P: [phone]  F: [phone]     Physical Therapy Daily Treatment Note    Date:  2025  Patient Name:  Ana Kang    :  1955  MRN: [id number]  Physician: Liliam Edouard, SJ - CNP                                     Insurance: : 1.Medicare; [place] yr; vs based on med nec; no auth req; 20% coins; 257/0 met ded; no OOP limit  2.AARP Medicare Supp; [place] yr; follows primary; no pt resp due   Medical Diagnosis: Frequent falls [R29.6], Multiple sclerosis (HCC) [G35], Bilateral leg weakness [R29.898]    Rehab Codes: Frequent falls [R29.6],[G35], Bilateral leg weakness [R29.898]   Onset Date: 2020                                  Next 's appt: 
3/13/25 Neurology  Visit# / total visits: 19/20; Progress note for Medicare patient due at visit 20     Cancels/No Shows: 1/0    Subjective:  Patient arrives 30 minutes late, no recent falls. She was not overly fatigued after PT/OT last week.  Pain:  [] Yes  [x] No Location:  N/A Pain Rating: (0-10 scale) -/10  Pain altered Tx:  [x] No  [] Yes  Action:  Comments:     Frequency:  2 x/week for 20 visits     Today’s Treatment:  Modalities:   Precautions [] No  [x] Yes: MS, high fall risk,   Exercises:   Exercise Reps/ Time Weight/ Level Comments Completed          Circuit training    30 sec rest w/ seated ex  60 sec rest w/ standing ex  4 minute rest break in between sets    STS 2x45\"      Mini lunges 2x30\"ea      Marches 2x60\" 4in/6in     NBOS 2x30\"  No UE a    Fwd/lat weight shifts 2x30\"  B UE a    Step ups 4xea 4in     Toe taps 2x30\"             Gait 2x150 ft  Rollator      Seated           march  2x40\"    30 sec rest break x   Hip abduction 2x40\"  lime   x   LAQ 2x40\"   \"  \" Cuing for slow movements x   Toe raises 2x40\"    \"   \" x   Lateral WS elbow taps 2x45\"   Slow eccentrics             Specific Instructions for next treatment: Functional strength, transfers, balance and gait         Treatment Charges: Mins Units   []  Modalities       [x]  Ther Exercise 30 2   []  Neuromuscular Re-ed     []  Gait Training     []  Manual Therapy     []  Ther Activities     []  Aquatics     []  Vasocompression     []  Cervical Traction     []  Other     Total Billable time 30 2          Assessment: [x] Progressing toward goals. Patient arrives 30 minutes late this date due to conflicting appointment. Began w/ [id number], requiring extended seat rest break in between, patient rated RPE at 4/10 after completing. Completed seated therex with 40 second intervals, attempted STS however unable to complete due to fatigue levels. Discussed next visit being last w/ patient and  agreeing.      [] Other:   [x] Patient would continue to

## 2025-04-02 NOTE — FLOWSHEET NOTE
[] [facility]  Outpatient Rehabilitation &  Therapy  [address]  [phone]  F: [phone] [] [facility]  Outpatient Rehabilitation &  Therapy  [address]   [address]  P: [phone]  F: [phone] [x] [facility]  Outpatient Rehabilitation &  Therapy  [address]  P: [phone]  F: [phone] [] [facility]  Outpatient Rehabilitation &  Therapy  [address]   P: [phone]  F: [phone] [] [facility]   Outpatient Rehabilitation   & Therapy  [address]  P: [phone]   F: [phone]     Occupational Therapy Daily Treatment Note    Date:  2025  Patient Name:  Ana Kang    :  1955  MRN: [id number]  Referring Provider:  Danitza Luevano DO   Insurance: Medicare - based on med nec   Medical Diagnosis: G25.0 (ICD-10-CM) - Essential tremor              Rehab Codes: essential tremor is G25.0   Onset Date: 3/14/25                 Next  Appt: 25  Visit# / total visits: 3/8; Progress note for Medicare patient due at visit 8                                  Cancels/No Shows: 0/0    Subjective:    Pain:  [] Yes  [x] No Location:  N/A Pain Rating: (0-10 scale) 0/10  Pain altered Tx:  [x] No  [] Yes  Action:  Pt Comments:  My tremors are still worse in my left hand.  I have tried to do some of the exercises.     Precautions [] No  [x] Yes: MS, high fall risk, :  Surgery procedure and date:    Objective:  Today's Treatment:  Modalities:    Exercise Flow Sheet:  Exercise Reps/Time Weight/Level Comments Completed   Putty gripping 3 mins yellow  x   Intrinsic strengthening   3x10 red  X   digigrip   10x2  yellow   X    tennis ball  A-g & H-z     1/2 in ea hand -   web   10x2  yellow   -   flexbar   10x1  yellow  Prn/sup X   Foam transfer  1x   Each hand Much greater tremors in L vs R -   Function   Dressing    4/2/25 rev wt cup w/2 handles, reacher/dressing

## 2025-04-04 ENCOUNTER — APPOINTMENT (OUTPATIENT)
Dept: PHYSICAL THERAPY | Facility: CLINIC | Age: 70
End: 2025-04-04
Payer: MEDICARE

## 2025-05-08 DIAGNOSIS — F41.9 ANXIETY: ICD-10-CM

## 2025-05-08 RX ORDER — PAROXETINE 30 MG/1
30 TABLET, FILM COATED ORAL DAILY
Qty: 90 TABLET | Refills: 3 | Status: SHIPPED | OUTPATIENT
Start: 2025-05-08

## 2025-05-12 ENCOUNTER — HOSPITAL ENCOUNTER (INPATIENT)
Age: 70
LOS: 2 days | Discharge: ANOTHER HOSPITAL | DRG: 563 | End: 2025-05-14
Attending: EMERGENCY MEDICINE | Admitting: STUDENT IN AN ORGANIZED HEALTH CARE EDUCATION/TRAINING PROGRAM
Payer: MEDICARE

## 2025-05-12 ENCOUNTER — APPOINTMENT (OUTPATIENT)
Dept: GENERAL RADIOLOGY | Age: 70
DRG: 563 | End: 2025-05-12
Payer: MEDICARE

## 2025-05-12 DIAGNOSIS — S82.841A CLOSED BIMALLEOLAR FRACTURE OF RIGHT ANKLE, INITIAL ENCOUNTER: Primary | ICD-10-CM
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
## 2025-05-12 PROCEDURE — 1200000000 HC SEMI PRIVATE

## 2025-05-12 PROCEDURE — 6360000002 HC RX W HCPCS

## 2025-05-12 PROCEDURE — 99222 1ST HOSP IP/OBS MODERATE 55: CPT

## 2025-05-12 PROCEDURE — 99285 EMERGENCY DEPT VISIT HI MDM: CPT

## 2025-05-12 PROCEDURE — 2W3LX1Z IMMOBILIZATION OF RIGHT LOWER EXTREMITY USING SPLINT: ICD-10-PCS | Performed by: EMERGENCY MEDICINE

## 2025-05-12 PROCEDURE — 73610 X-RAY EXAM OF ANKLE: CPT

## 2025-05-12 RX ORDER — FENTANYL CITRATE 50 UG/ML
50 INJECTION, SOLUTION INTRAMUSCULAR; INTRAVENOUS ONCE
Status: COMPLETED | OUTPATIENT
Start: 2025-05-12 | End: 2025-05-12

## 2025-05-12 RX ADMIN — FENTANYL CITRATE 50 MCG: 50 INJECTION, SOLUTION INTRAMUSCULAR; INTRAVENOUS at 23:25

## 2025-05-12 ASSESSMENT — ENCOUNTER SYMPTOMS
NAUSEA: 0
COLOR CHANGE: 0
CONSTIPATION: 0
COUGH: 0
CHEST TIGHTNESS: 0
ABDOMINAL PAIN: 0
BACK PAIN: 0
DIARRHEA: 0
VOMITING: 0
SHORTNESS OF BREATH: 0

## 2025-05-12 ASSESSMENT — PAIN - FUNCTIONAL ASSESSMENT: PAIN_FUNCTIONAL_ASSESSMENT: 0-10

## 2025-05-12 ASSESSMENT — PAIN SCALES - GENERAL: PAINLEVEL_OUTOF10: 4

## 2025-05-13 ENCOUNTER — TELEPHONE (OUTPATIENT)
Dept: NEUROLOGY | Age: 70
End: 2025-05-13

## 2025-05-13 LAB
ALBUMIN SERPL-MCNC: 4.2 G/DL (ref 3.5–5.2)
ALBUMIN/GLOB SERPL: 1.1 {RATIO} (ref 1–2.5)
ALP SERPL-CCNC: 75 U/L (ref 35–104)
ALT SERPL-CCNC: 22 U/L (ref 5–33)
ANION GAP SERPL CALCULATED.3IONS-SCNC: 12 MMOL/L (ref 9–17)
AST SERPL-CCNC: 29 U/L
BASOPHILS # BLD: 0.03 K/UL (ref 0–0.2)
BASOPHILS NFR BLD: 0 % (ref 0–2)
BILIRUB SERPL-MCNC: 0.3 MG/DL (ref 0.3–1.2)
BUN SERPL-MCNC: 17 MG/DL (ref 8–23)
CALCIUM SERPL-MCNC: 10.2 MG/DL (ref 8.6–10.4)
CHLORIDE SERPL-SCNC: 102 MMOL/L (ref 98–107)
CO2 SERPL-SCNC: 25 MMOL/L (ref 20–31)
CREAT SERPL-MCNC: 1 MG/DL (ref 0.5–0.9)
EOSINOPHIL # BLD: 0.08 K/UL (ref 0–0.4)
EOSINOPHILS RELATIVE PERCENT: 1 % (ref 1–4)
ERYTHROCYTE [DISTWIDTH] IN BLOOD BY AUTOMATED COUNT: 14.9 % (ref 12.5–15.4)
GFR, ESTIMATED: 62 ML/MIN/1.73M2
GLUCOSE SERPL-MCNC: 122 MG/DL (ref 70–99)
HCT VFR BLD AUTO: 40.8 % (ref 36–46)
HGB BLD-MCNC: 13 G/DL (ref 12–16)
INR PPP: 1 (ref 0.9–1.2)
LYMPHOCYTES NFR BLD: 0.97 K/UL (ref 1–4.8)
LYMPHOCYTES RELATIVE PERCENT: 12 % (ref 24–44)
MCH RBC QN AUTO: 31.5 PG (ref 26–34)
MCHC RBC AUTO-ENTMCNC: 31.9 G/DL (ref 31–37)
MCV RBC AUTO: 98.8 FL (ref 80–100)
MONOCYTES NFR BLD: 1.2 K/UL (ref 0.1–1.2)
MONOCYTES NFR BLD: 15 % (ref 2–11)
NEUTROPHILS NFR BLD: 72 % (ref 36–66)
NEUTS SEG NFR BLD: 5.71 K/UL (ref 1.8–7.7)
PLATELET # BLD AUTO: 263 K/UL (ref 140–450)
PMV BLD AUTO: 10.2 FL (ref 8–14)
POTASSIUM SERPL-SCNC: 4.5 MMOL/L (ref 3.7–5.3)
PROT SERPL-MCNC: 8.1 G/DL (ref 6.4–8.3)
PROTHROMBIN TIME: 13.3 SEC (ref 11.8–14.6)
RBC # BLD AUTO: 4.13 M/UL (ref 4–5.2)
SODIUM SERPL-SCNC: 139 MMOL/L (ref 135–144)
WBC OTHER # BLD: 8 K/UL (ref 3.5–11)

## 2025-05-13 PROCEDURE — 36415 COLL VENOUS BLD VENIPUNCTURE: CPT

## 2025-05-13 PROCEDURE — 85610 PROTHROMBIN TIME: CPT

## 2025-05-13 PROCEDURE — 6370000000 HC RX 637 (ALT 250 FOR IP)

## 2025-05-13 PROCEDURE — 6360000002 HC RX W HCPCS

## 2025-05-13 PROCEDURE — 99232 SBSQ HOSP IP/OBS MODERATE 35: CPT | Performed by: STUDENT IN AN ORGANIZED HEALTH CARE EDUCATION/TRAINING PROGRAM

## 2025-05-13 PROCEDURE — 85025 COMPLETE CBC W/AUTO DIFF WBC: CPT

## 2025-05-13 PROCEDURE — 2500000003 HC RX 250 WO HCPCS

## 2025-05-13 PROCEDURE — 1200000000 HC SEMI PRIVATE

## 2025-05-13 PROCEDURE — 80053 COMPREHEN METABOLIC PANEL: CPT

## 2025-05-13 RX ORDER — ONDANSETRON 4 MG/1
4 TABLET, ORALLY DISINTEGRATING ORAL EVERY 8 HOURS PRN
Status: DISCONTINUED | OUTPATIENT
Start: 2025-05-13 | End: 2025-05-15 | Stop reason: HOSPADM

## 2025-05-13 RX ORDER — OXYCODONE HYDROCHLORIDE 5 MG/1
2.5 TABLET ORAL EVERY 4 HOURS PRN
Status: DISCONTINUED | OUTPATIENT
Start: 2025-05-13 | End: 2025-05-15 | Stop reason: HOSPADM

## 2025-05-13 RX ORDER — AMLODIPINE BESYLATE 5 MG/1
5 TABLET ORAL DAILY
Status: DISCONTINUED | OUTPATIENT
Start: 2025-05-13 | End: 2025-05-15 | Stop reason: HOSPADM

## 2025-05-13 RX ORDER — OXYCODONE HYDROCHLORIDE 5 MG/1
5 TABLET ORAL EVERY 4 HOURS PRN
Status: DISCONTINUED | OUTPATIENT
Start: 2025-05-13 | End: 2025-05-15 | Stop reason: HOSPADM

## 2025-05-13 RX ORDER — PROPRANOLOL HYDROCHLORIDE 60 MG/1
60 CAPSULE, EXTENDED RELEASE ORAL DAILY
Status: DISCONTINUED | OUTPATIENT
Start: 2025-05-13 | End: 2025-05-15 | Stop reason: HOSPADM

## 2025-05-13 RX ORDER — MORPHINE SULFATE 2 MG/ML
1 INJECTION, SOLUTION INTRAMUSCULAR; INTRAVENOUS
Status: DISCONTINUED | OUTPATIENT
Start: 2025-05-13 | End: 2025-05-15 | Stop reason: HOSPADM

## 2025-05-13 RX ORDER — PAROXETINE 20 MG/1
30 TABLET, FILM COATED ORAL DAILY
Status: DISCONTINUED | OUTPATIENT
Start: 2025-05-13 | End: 2025-05-15 | Stop reason: HOSPADM

## 2025-05-13 RX ORDER — POLYETHYLENE GLYCOL 3350 17 G/17G
17 POWDER, FOR SOLUTION ORAL DAILY PRN
Status: DISCONTINUED | OUTPATIENT
Start: 2025-05-13 | End: 2025-05-15 | Stop reason: HOSPADM

## 2025-05-13 RX ORDER — PRIMIDONE 50 MG/1
100 TABLET ORAL 2 TIMES DAILY
Status: DISCONTINUED | OUTPATIENT
Start: 2025-05-13 | End: 2025-05-15 | Stop reason: HOSPADM

## 2025-05-13 RX ORDER — HYDROCHLOROTHIAZIDE 25 MG/1
25 TABLET ORAL DAILY
Status: DISCONTINUED | OUTPATIENT
Start: 2025-05-13 | End: 2025-05-15 | Stop reason: HOSPADM

## 2025-05-13 RX ORDER — SODIUM CHLORIDE 0.9 % (FLUSH) 0.9 %
5-40 SYRINGE (ML) INJECTION PRN
Status: DISCONTINUED | OUTPATIENT
Start: 2025-05-13 | End: 2025-05-15 | Stop reason: HOSPADM

## 2025-05-13 RX ORDER — LISINOPRIL AND HYDROCHLOROTHIAZIDE 20; 25 MG/1; MG/1
1 TABLET ORAL DAILY
Status: DISCONTINUED | OUTPATIENT
Start: 2025-05-13 | End: 2025-05-13

## 2025-05-13 RX ORDER — SODIUM CHLORIDE 9 MG/ML
INJECTION, SOLUTION INTRAVENOUS PRN
Status: DISCONTINUED | OUTPATIENT
Start: 2025-05-13 | End: 2025-05-15 | Stop reason: HOSPADM

## 2025-05-13 RX ORDER — ACETAMINOPHEN 325 MG/1
650 TABLET ORAL EVERY 6 HOURS
Status: DISCONTINUED | OUTPATIENT
Start: 2025-05-13 | End: 2025-05-15 | Stop reason: HOSPADM

## 2025-05-13 RX ORDER — ONDANSETRON 2 MG/ML
4 INJECTION INTRAMUSCULAR; INTRAVENOUS EVERY 6 HOURS PRN
Status: DISCONTINUED | OUTPATIENT
Start: 2025-05-13 | End: 2025-05-15 | Stop reason: HOSPADM

## 2025-05-13 RX ORDER — LISINOPRIL 10 MG/1
20 TABLET ORAL DAILY
Status: DISCONTINUED | OUTPATIENT
Start: 2025-05-13 | End: 2025-05-15 | Stop reason: HOSPADM

## 2025-05-13 RX ORDER — SODIUM CHLORIDE 0.9 % (FLUSH) 0.9 %
5-40 SYRINGE (ML) INJECTION EVERY 12 HOURS SCHEDULED
Status: DISCONTINUED | OUTPATIENT
Start: 2025-05-13 | End: 2025-05-15 | Stop reason: HOSPADM

## 2025-05-13 RX ORDER — VITAMIN B COMPLEX
4000 TABLET ORAL DAILY
Status: DISCONTINUED | OUTPATIENT
Start: 2025-05-13 | End: 2025-05-15 | Stop reason: HOSPADM

## 2025-05-13 RX ORDER — MORPHINE SULFATE 2 MG/ML
2 INJECTION, SOLUTION INTRAMUSCULAR; INTRAVENOUS
Status: DISCONTINUED | OUTPATIENT
Start: 2025-05-13 | End: 2025-05-15 | Stop reason: HOSPADM

## 2025-05-13 RX ORDER — SULFASALAZINE 500 MG/1
500 TABLET ORAL 4 TIMES DAILY
Status: DISCONTINUED | OUTPATIENT
Start: 2025-05-13 | End: 2025-05-15 | Stop reason: HOSPADM

## 2025-05-13 RX ORDER — DICYCLOMINE HYDROCHLORIDE 10 MG/1
20 CAPSULE ORAL 4 TIMES DAILY PRN
Status: DISCONTINUED | OUTPATIENT
Start: 2025-05-13 | End: 2025-05-15 | Stop reason: HOSPADM

## 2025-05-13 RX ORDER — TROSPIUM CHLORIDE 20 MG/1
20 TABLET, FILM COATED ORAL
Status: DISCONTINUED | OUTPATIENT
Start: 2025-05-13 | End: 2025-05-15 | Stop reason: HOSPADM

## 2025-05-13 RX ORDER — GABAPENTIN 300 MG/1
600 CAPSULE ORAL 3 TIMES DAILY
Status: DISCONTINUED | OUTPATIENT
Start: 2025-05-13 | End: 2025-05-15 | Stop reason: HOSPADM

## 2025-05-13 RX ADMIN — TROSPIUM CHLORIDE 20 MG: 20 TABLET, FILM COATED ORAL at 11:58

## 2025-05-13 RX ADMIN — LISINOPRIL 20 MG: 10 TABLET ORAL at 09:40

## 2025-05-13 RX ADMIN — SULFASALAZINE 500 MG: 500 TABLET ORAL at 11:59

## 2025-05-13 RX ADMIN — HYDROCHLOROTHIAZIDE 25 MG: 25 TABLET ORAL at 09:41

## 2025-05-13 RX ADMIN — PROPRANOLOL HYDROCHLORIDE 60 MG: 60 CAPSULE, EXTENDED RELEASE ORAL at 11:59

## 2025-05-13 RX ADMIN — SODIUM CHLORIDE, PRESERVATIVE FREE 10 ML: 5 INJECTION INTRAVENOUS at 21:08

## 2025-05-13 RX ADMIN — Medication 4000 UNITS: at 09:41

## 2025-05-13 RX ADMIN — ACETAMINOPHEN 650 MG: 325 TABLET ORAL at 15:05

## 2025-05-13 RX ADMIN — OXYCODONE HYDROCHLORIDE 2.5 MG: 5 TABLET ORAL at 17:37

## 2025-05-13 RX ADMIN — GABAPENTIN 600 MG: 300 CAPSULE ORAL at 15:05

## 2025-05-13 RX ADMIN — SODIUM CHLORIDE, PRESERVATIVE FREE 10 ML: 5 INJECTION INTRAVENOUS at 09:43

## 2025-05-13 RX ADMIN — AMLODIPINE BESYLATE 5 MG: 5 TABLET ORAL at 09:41

## 2025-05-13 RX ADMIN — ACETAMINOPHEN 650 MG: 325 TABLET ORAL at 17:37

## 2025-05-13 RX ADMIN — PRIMIDONE 100 MG: 50 TABLET ORAL at 21:08

## 2025-05-13 RX ADMIN — GABAPENTIN 600 MG: 300 CAPSULE ORAL at 21:08

## 2025-05-13 RX ADMIN — PRIMIDONE 100 MG: 50 TABLET ORAL at 09:41

## 2025-05-13 RX ADMIN — GABAPENTIN 600 MG: 300 CAPSULE ORAL at 09:41

## 2025-05-13 RX ADMIN — PAROXETINE HYDROCHLORIDE 30 MG: 20 TABLET, FILM COATED ORAL at 12:00

## 2025-05-13 RX ADMIN — MORPHINE SULFATE 1 MG: 2 INJECTION, SOLUTION INTRAMUSCULAR; INTRAVENOUS at 06:35

## 2025-05-13 ASSESSMENT — PAIN SCALES - GENERAL
PAINLEVEL_OUTOF10: 7
PAINLEVEL_OUTOF10: 5
PAINLEVEL_OUTOF10: 6

## 2025-05-13 ASSESSMENT — PAIN DESCRIPTION - ORIENTATION: ORIENTATION: RIGHT

## 2025-05-13 ASSESSMENT — PAIN DESCRIPTION - LOCATION
LOCATION: ANKLE
LOCATION: ANKLE

## 2025-05-13 NOTE — ED NOTES
returns to bedside; pt eating lunch well. Ice bag refilled  
Pt awake lying in bed w  at bedside; assist x 2 in repositioning in bed; HOB up; resp nonlabored; lungs clear biatl room air; pillow to head and pillow to elevate Rt leg and new ice bag applied. Call light within reach  
conditions prevent the ability to walk long distances    MISC. DEVICES MISC    Provide rollator walker with seat    OMEPRAZOLE (PRILOSEC) 20 MG DELAYED RELEASE CAPSULE    TAKE 1 CAPSULE EVERY       MORNING BEFORE BREAKFAST    PAROXETINE (PAXIL) 30 MG TABLET    TAKE 1 TABLET DAILY    PRIMIDONE (MYSOLINE) 50 MG TABLET    TAKE 2 TABLETS BY MOUTH IN THE  MORNING AND AT BEDTIME    PROPRANOLOL (INDERAL LA) 60 MG EXTENDED RELEASE CAPSULE    Take 1 capsule by mouth    SULFASALAZINE (AZULFIDINE) 500 MG TABLET    Take 1 tablet by mouth 4 times daily    TROSPIUM (SANCTURA) 20 MG TABLET    Take 1 tablet by mouth 2 times daily    VITAMIN D (CHOLECALCIFEROL) 50 MCG (2000 UT) TABS TABLET    Take 1 tablet by mouth daily     Orders Placed This Encounter   Medications    fentaNYL (SUBLIMAZE) injection 50 mcg       SURGICAL HISTORY       Past Surgical History:   Procedure Laterality Date    ANKLE FRACTURE SURGERY  2021    left    APPENDECTOMY       SECTION      CHOLECYSTECTOMY      COLONOSCOPY  2013, 2015    CYST REMOVAL      FOOT SURGERY  2012    right foot bunionectomy    NERVE SURGERY N/A 2023    INTERSTIM IMPLANT STAGE 1 AND 2 performed by Low Villasenor MD at [facility] OR    OTHER SURGICAL HISTORY  2023    INTERSTIM IMPLANT STAGE 1 AND 2    TONSILLECTOMY AND ADENOIDECTOMY      WRIST FRACTURE SURGERY         PAST MEDICAL HISTORY       Past Medical History:   Diagnosis Date    Anxiety     Crohn's disease (HCC)     Dr. Cam GI O last visit 2023    Diverticulosis     Fatigue     Hepatitis     pt denies    Hypertension     Impaired functional mobility, balance, gait, and endurance     Morbid obesity (HCC)     Multiple sclerosis (HCC)     Dr. Luevano Neurology [facility]  10/5/2023    Osteopenia     Psoriasis     Tremor     Yes    Under care of team     [facility]    Urge incontinence     Dr. Villasenor Urology    Well adult exam     Liliam Edouard [facility] last

## 2025-05-13 NOTE — CONSULTS
Orthopaedic Surgery Consult  (Dr. Murillo)    CC/Reason for consult: Right bimalleolar ankle fracture    HPI:      The patient is a 69 y.o. thank you female who presented to the [place] emergency department yesterday  after a fall from standing height while trying to enter her bathroom.  Patient denied hitting her head or losing consciousness.  She was unable to get up and ambulate after the fall.  Radiographic images were taken which demonstrated bimalleolar ankle fracture.  Orthopedics consulted at this time.    Upon orthopedic evaluation, patient was resting comfortably in bed.  Patient agreed to above history.  She denies any numbness or tingling to the digits.  She denies any other pain elsewhere at this time. Patient was placed in splint by the ED.    Patient is retired.  Patient denies taking chemical anticoagulation.  Patient ambulates with a walker at baseline.  Patient has a past orthopedic surgical history of left ankle ORIF in , right foot bunionectomy.  Patient has a past medical history of anxiety, Crohn's disease, diverticulosis, hypertension, multiple sclerosis, psoriasis, tremor.    Past Medical History:    Past Medical History:   Diagnosis Date    Anxiety     Crohn's disease (HCC)     Dr. Cma GI NWO last visit 2023    Diverticulosis     Fatigue     Hepatitis     pt denies    Hypertension     Impaired functional mobility, balance, gait, and endurance     Morbid obesity (HCC)     Multiple sclerosis (HCC)     Dr. Luevano Neurology [facility]  10/5/2023    Osteopenia     Psoriasis     Tremor     Yes    Under care of team     [facility]    Urge incontinence     Dr. Villasenor Urology    Well adult exam     Liliam Edouard [facility] last appt 2023     Past Surgical History:    Past Surgical History:   Procedure Laterality Date    ANKLE FRACTURE SURGERY  2021    left    APPENDECTOMY       SECTION      CHOLECYSTECTOMY      COLONOSCOPY  april

## 2025-05-13 NOTE — PROGRESS NOTES
[facility]  Office: [phone]  Roberto Steven DO, Jordan Jimenez, DO, Willie Byers DO, Miguelangel Baird DO, Patrick Quinn MD, Yaritza Burger MD, Jocelynn Medel MD, Leanna Cummings MD,  Marco Jones MD, David Patino MD, Sophie Roblero MD,  Zofia Pond DO, Ketty Madison MD, Chito Holley MD, Christos Steven DO, Francisca Miner MD,  Darci Barbour DO, Maryann Cuenca MD, Mitzy Bueno MD, Reina Sebastian MD,  Sacha Lofton MD, Kati Abraham MD, Cecily Stout MD, Jaswant Abel MD, Josh Fine MD, Santos Araya MD, Eliezer Perez DO, Marisol Antunez MD, Tutu Tyler MD, Zofia Martinez MD, Mohsin Reza, MD, Lawanda Yadav MD, Shirley Waterhouse, CNP,  Aletha Saleh, CNP, Eliezer Gutierrez, CNP,  Nayeli Laird, DNP, Verna Carrera, CNP, Savannah Gonzales, CNP, Lilly Crawford, CNP, Yelitza Henley, CNP, Becca Hawley, PA-C, Barby Howard, CNP, Vicki Reese, CNP,  Heather Santos, CNP, Christina Fuchs, CNP, Sourav Turpin, PA-C, Bertha Barriso, CNP,  Susan Byers, CNS, Alina Cordero, CNP, Fiona Servin, CNP,   Gianna Jones, CNP         [facility]   IN-PATIENT SERVICE   [facility]    Progress Note    5/13/2025    10:39 AM    Name:   Ana Kang  MRN:     [id number]     Acct:      [id number]   Room:   [facility]/ER08-B   Day:  1  Admit Date:  5/12/2025  8:29 PM    PCP:   Liliam Edouard APRN - CNP  Code Status:  Full Code    Subjective:     Patient was seen in follow-up for right ankle fracture. She reports feeling better overall but continues to endorse right ankle pain. Orthopedic surgery has been consulted; appreciate recommendations.     Medications:     Allergies:  No Known Allergies    Current Meds:   Scheduled Meds:    sodium chloride flush  5-40 mL IntraVENous 2 times per day    acetaminophen  650 mg Oral Q6H    propranolol  60 mg Oral Daily    trospium  20 mg Oral BID AC    amLODIPine  5 mg Oral Daily    Diroximel Fumarate  2 capsule Oral BID    gabapentin

## 2025-05-13 NOTE — ED PROVIDER NOTES
[facility] Emergency Department  [address]  [facility] [address]  Phone: [phone]  Fax: [phone]      Pt Name: Ana Kang  MRN: [id number]  Birthdate 1955  Date of evaluation: 25    CHIEF COMPLAINT       Chief Complaint   Patient presents with    Fall     Patient c/o right ankle pain d/t falling tonight when she was trying to walk to the bathroom with her walker. Patient denies hitting her head, denies LOC. Patient states she has hx of MS and states \" she sometimes looses her balance\".  Patient was given 100mcg of Fentanyl IV enroute by EMS. PMS intact to right ankle at this time.     Ankle Pain       PAST MEDICAL HISTORY    has a past medical history of Anxiety, Crohn's disease (HCC), Diverticulosis, Fatigue, Hepatitis, Hypertension, Impaired functional mobility, balance, gait, and endurance, Morbid obesity (HCC), Multiple sclerosis (HCC), Osteopenia, Psoriasis, Tremor, Under care of team, Urge incontinence, and Well adult exam.    SURGICAL HISTORY      has a past surgical history that includes Wrist fracture surgery;  section; Cholecystectomy; Tonsillectomy and adenoidectomy; Appendectomy; cyst removal; Foot surgery (2012); Colonoscopy (2013, 2015); Ankle fracture surgery (2021); other surgical history (2023); and Nerve Surgery (N/A, 2023).    CURRENT MEDICATIONS       Previous Medications    AMLODIPINE (NORVASC) 5 MG TABLET    TAKE 1 TABLET DAILY    CA CARB-FA-D-B6-B12-BORON-MG (CALCIUM-FOLIC ACID PLUS D PO)    Take by mouth daily    CLOBETASOL PROPIONATE 0.05 % SHAM    Shampoo daily    DICLOFENAC SODIUM (VOLTAREN) 1 % GEL    Apply 4 g topically 4 times daily    DICYCLOMINE (BENTYL) 20 MG TABLET    Take 1 tablet by mouth 4 times daily as needed (after loose stool)    DIROXIMEL FUMARATE (VUMERITY) 231 MG CPDR    Take 2 capsules by mouth 2 times daily    GABAPENTIN (NEURONTIN) 300 MG CAPSULE    Take 2 capsules by mouth 3 times

## 2025-05-13 NOTE — H&P
..  [facility]  Office: [phone]  Roberto Steven DO, Jordan Jimenez DO, Willie Byers DO, Miguelangel Baird DO, Patrick Quinn MD, Yaritza Burger MD, Jocelynn Medel MD, Leanna Cummings MD,  Marco Jones MD, David Patino MD, Sophie Roblero MD,  Zofia Pond DO, Ketty Madison MD, Chito Holley MD, Christos Steven DO, Francisca Miner MD,  Darci Barbour DO, Maryann Cuenca MD, Mitzy Bueno MD, Reina Sebastian MD,  Sacha Lofton MD, Kati Abraham MD, Cecily Stout MD, Jaswant Abel MD, Josh Fine MD, Santos Araya MD, Eliezer Perez DO, Marisol Antunez MD, Tutu Tyler MD, Zofia Martinez MD, Mohsin Reza, MD, Lawanda Yadav MD, Shirley Waterhouse, CNP,  Aletha Saleh, CNP, Eliezer Gutierrez, CNP,  Nayeli Laird, DNP, Verna Carrera, CNP, Savannah Gonzales, CNP, Lilly Crawford, CNP, Yelitza Henley, CNP, Becca Hawley, PA-C, Barby Howard, CNP, Vicki Reese, CNP,  Heather Santos, CNP, Christina Fuchs, CNP, Sourav Turpin PA-C, Bertha Barrios, CNP,  Susan Byers, CNS, Alina Cordero, CNP, Fiona Servin, CNP,   Gianna Jones, CNP         [facility]   IN-PATIENT SERVICE   [facility]    HISTORY AND PHYSICAL EXAMINATION            Date:   5/13/2025  Patient name:  Ana Kang  Date of admission:  5/12/2025  8:29 PM  MRN:   [id number]  Account:  [id number]  YOB: 1955  PCP:    Liliam Edouard APRN - CNP  [facility]  Code Status:    No Order    Chief Complaint:     Chief Complaint   Patient presents with    Fall     Patient c/o right ankle pain d/t falling tonight when she was trying to walk to the bathroom with her walker. Patient denies hitting her head, denies LOC. Patient states she has hx of MS and states \" she sometimes looses her balance\".  Patient was given 100mcg of Fentanyl IV enroute by EMS. PMS intact to right ankle at this time.     Ankle Pain       History Obtained From:     patient, electronic medical

## 2025-05-13 NOTE — PLAN OF CARE
Problem: Safety - Adult  Goal: Free from fall injury  Outcome: Progressing     Problem: Discharge Planning  Goal: Discharge to home or other facility with appropriate resources  Outcome: Progressing  Flowsheets  Taken 5/13/2025 1743 by Karla Call RN  Discharge to home or other facility with appropriate resources:   Identify barriers to discharge with patient and caregiver   Arrange for needed discharge resources and transportation as appropriate   Identify discharge learning needs (meds, wound care, etc)   Refer to discharge planning if patient needs post-hospital services based on physician order or complex needs related to functional status, cognitive ability or social support system  Taken 5/13/2025 1525 by Shaina Muniz LPN  Discharge to home or other facility with appropriate resources:   Identify barriers to discharge with patient and caregiver   Arrange for needed discharge resources and transportation as appropriate   Identify discharge learning needs (meds, wound care, etc)   Refer to discharge planning if patient needs post-hospital services based on physician order or complex needs related to functional status, cognitive ability or social support system     Problem: Pain  Goal: Verbalizes/displays adequate comfort level or baseline comfort level  Outcome: Progressing  Flowsheets (Taken 5/13/2025 1525)  Verbalizes/displays adequate comfort level or baseline comfort level:   Encourage patient to monitor pain and request assistance   Assess pain using appropriate pain scale   Administer analgesics based on type and severity of pain and evaluate response   Implement non-pharmacological measures as appropriate and evaluate response     Problem: ABCDS Injury Assessment  Goal: Absence of physical injury  Outcome: Progressing

## 2025-05-13 NOTE — ED PROVIDER NOTES
[facility] Emergency Department  [address]  [facility] [facility] [address]  Phone: [phone]  Fax: [phone]        Pt Name: Ana Kang  MRN: [id number]  Birthdate 1955  Date of evaluation: 5/12/25    CHIEFCOMPLAINT       Chief Complaint   Patient presents with    Fall     Patient c/o right ankle pain d/t falling tonight when she was trying to walk to the bathroom with her walker. Patient denies hitting her head, denies LOC. Patient states she has hx of MS and states \" she sometimes looses her balance\".  Patient was given 100mcg of Fentanyl IV enroute by EMS. PMS intact to right ankle at this time.     Ankle Pain       HISTORY OF PRESENT ILLNESS (Location/Symptom, Timing/Onset, Context/Setting, Quality, Duration, Modifying Factors, Severity)      Ana Kang is a 69 y.o. female with no pertinent PMH who presents to the ED via EMS with complaint of right ankle pain.  Patient ambulates with a wheeling walker at baseline due to unsteady gait and tremor.  Patient was at home and walking to the bathroom when she had a misstep lost her balance and fell onto her bottom, states she twisted her right ankle during this process.  Did not hit her head, no LOC.  Patient did receive IV fentanyl per EMS for pain of 9 out of 10, arrives with pain of 4 out of 10.  Previous bunion surgery to the right foot, no other surgery to the ankle or right lower leg.  Does follow with podiatry for nail trims, Dr. Murillo.  States she broke her left ankle over 5 years ago, cannot remember the orthopedic surgeon who repaired this.  Patient denies any neck or back pain.  No numbness, tingling, weakness to her arms or legs.    PAST MEDICAL / SURGICAL / SOCIAL / FAMILY HISTORY     PMH:  has a past medical history of Anxiety, Crohn's disease (HCC), Diverticulosis, Fatigue, Hepatitis, Hypertension, Impaired functional mobility, balance, gait, and endurance, Morbid obesity (HCC), Multiple sclerosis (HCC),

## 2025-05-14 ENCOUNTER — ANESTHESIA (OUTPATIENT)
Dept: OPERATING ROOM | Age: 70
DRG: 563 | End: 2025-05-14
Payer: MEDICARE

## 2025-05-14 ENCOUNTER — DIRECT ADMIT ORDERS (OUTPATIENT)
Dept: INTERNAL MEDICINE CLINIC | Age: 70
End: 2025-05-14

## 2025-05-14 ENCOUNTER — ANESTHESIA EVENT (OUTPATIENT)
Dept: OPERATING ROOM | Age: 70
DRG: 563 | End: 2025-05-14
Payer: MEDICARE

## 2025-05-14 ENCOUNTER — APPOINTMENT (OUTPATIENT)
Dept: GENERAL RADIOLOGY | Age: 70
DRG: 563 | End: 2025-05-14
Payer: MEDICARE

## 2025-05-14 VITALS
BODY MASS INDEX: 42.48 KG/M2 | OXYGEN SATURATION: 92 % | SYSTOLIC BLOOD PRESSURE: 101 MMHG | HEIGHT: 61 IN | WEIGHT: 225 LBS | TEMPERATURE: 97.7 F | RESPIRATION RATE: 16 BRPM | DIASTOLIC BLOOD PRESSURE: 88 MMHG | HEART RATE: 72 BPM

## 2025-05-14 PROCEDURE — 6370000000 HC RX 637 (ALT 250 FOR IP)

## 2025-05-14 PROCEDURE — 2500000003 HC RX 250 WO HCPCS

## 2025-05-14 PROCEDURE — 6360000002 HC RX W HCPCS

## 2025-05-14 PROCEDURE — 99238 HOSP IP/OBS DSCHRG MGMT 30/<: CPT | Performed by: STUDENT IN AN ORGANIZED HEALTH CARE EDUCATION/TRAINING PROGRAM

## 2025-05-14 RX ORDER — FENTANYL CITRATE 50 UG/ML
100 INJECTION, SOLUTION INTRAMUSCULAR; INTRAVENOUS
Status: DISCONTINUED | OUTPATIENT
Start: 2025-05-14 | End: 2025-05-14 | Stop reason: HOSPADM

## 2025-05-14 RX ORDER — SODIUM CHLORIDE 9 MG/ML
INJECTION, SOLUTION INTRAVENOUS CONTINUOUS
Status: CANCELLED | OUTPATIENT
Start: 2025-05-14 | End: 2025-05-15

## 2025-05-14 RX ORDER — POTASSIUM CHLORIDE 1500 MG/1
40 TABLET, EXTENDED RELEASE ORAL PRN
Status: CANCELLED | OUTPATIENT
Start: 2025-05-14

## 2025-05-14 RX ORDER — SODIUM CHLORIDE, SODIUM LACTATE, POTASSIUM CHLORIDE, CALCIUM CHLORIDE 600; 310; 30; 20 MG/100ML; MG/100ML; MG/100ML; MG/100ML
INJECTION, SOLUTION INTRAVENOUS CONTINUOUS
Status: DISCONTINUED | OUTPATIENT
Start: 2025-05-14 | End: 2025-05-14 | Stop reason: HOSPADM

## 2025-05-14 RX ORDER — TRANEXAMIC ACID 10 MG/ML
INJECTION, SOLUTION INTRAVENOUS
Status: DISCONTINUED
Start: 2025-05-14 | End: 2025-05-14 | Stop reason: WASHOUT

## 2025-05-14 RX ORDER — SODIUM CHLORIDE 0.9 % (FLUSH) 0.9 %
10 SYRINGE (ML) INJECTION PRN
Status: CANCELLED | OUTPATIENT
Start: 2025-05-14

## 2025-05-14 RX ORDER — TERIFLUNOMIDE 14 MG/1
14 TABLET, FILM COATED ORAL DAILY
Status: ON HOLD | COMMUNITY

## 2025-05-14 RX ORDER — SODIUM CHLORIDE 0.9 % (FLUSH) 0.9 %
5-40 SYRINGE (ML) INJECTION EVERY 12 HOURS SCHEDULED
Status: DISCONTINUED | OUTPATIENT
Start: 2025-05-14 | End: 2025-05-14 | Stop reason: HOSPADM

## 2025-05-14 RX ORDER — ONDANSETRON 2 MG/ML
4 INJECTION INTRAMUSCULAR; INTRAVENOUS EVERY 6 HOURS PRN
Status: CANCELLED | OUTPATIENT
Start: 2025-05-14

## 2025-05-14 RX ORDER — SODIUM CHLORIDE 0.9 % (FLUSH) 0.9 %
5-40 SYRINGE (ML) INJECTION EVERY 12 HOURS SCHEDULED
Status: CANCELLED | OUTPATIENT
Start: 2025-05-14

## 2025-05-14 RX ORDER — MIDAZOLAM HYDROCHLORIDE 1 MG/ML
INJECTION, SOLUTION INTRAMUSCULAR; INTRAVENOUS
Status: DISCONTINUED
Start: 2025-05-14 | End: 2025-05-15 | Stop reason: HOSPADM

## 2025-05-14 RX ORDER — NALOXONE HYDROCHLORIDE 0.4 MG/ML
INJECTION, SOLUTION INTRAMUSCULAR; INTRAVENOUS; SUBCUTANEOUS PRN
Status: CANCELLED | OUTPATIENT
Start: 2025-05-14

## 2025-05-14 RX ORDER — VANCOMYCIN HYDROCHLORIDE 1 G/20ML
INJECTION, POWDER, LYOPHILIZED, FOR SOLUTION INTRAVENOUS
Status: DISCONTINUED
Start: 2025-05-14 | End: 2025-05-14 | Stop reason: WASHOUT

## 2025-05-14 RX ORDER — SODIUM CHLORIDE 9 MG/ML
INJECTION, SOLUTION INTRAVENOUS PRN
Status: CANCELLED | OUTPATIENT
Start: 2025-05-14

## 2025-05-14 RX ORDER — MAGNESIUM SULFATE 1 G/100ML
1000 INJECTION INTRAVENOUS PRN
Status: CANCELLED | OUTPATIENT
Start: 2025-05-14

## 2025-05-14 RX ORDER — LABETALOL HYDROCHLORIDE 5 MG/ML
10 INJECTION, SOLUTION INTRAVENOUS
Status: CANCELLED | OUTPATIENT
Start: 2025-05-14

## 2025-05-14 RX ORDER — PROCHLORPERAZINE EDISYLATE 5 MG/ML
5 INJECTION INTRAMUSCULAR; INTRAVENOUS
Status: CANCELLED | OUTPATIENT
Start: 2025-05-14

## 2025-05-14 RX ORDER — SODIUM CHLORIDE 0.9 % (FLUSH) 0.9 %
5-40 SYRINGE (ML) INJECTION PRN
Status: CANCELLED | OUTPATIENT
Start: 2025-05-14

## 2025-05-14 RX ORDER — LIDOCAINE HYDROCHLORIDE 10 MG/ML
1 INJECTION, SOLUTION EPIDURAL; INFILTRATION; INTRACAUDAL; PERINEURAL
Status: DISCONTINUED | OUTPATIENT
Start: 2025-05-14 | End: 2025-05-14 | Stop reason: HOSPADM

## 2025-05-14 RX ORDER — BUPIVACAINE HYDROCHLORIDE AND EPINEPHRINE 5; 5 MG/ML; UG/ML
INJECTION, SOLUTION PERINEURAL
Status: DISCONTINUED
Start: 2025-05-14 | End: 2025-05-14 | Stop reason: WASHOUT

## 2025-05-14 RX ORDER — TOBRAMYCIN 1.2 G/30ML
INJECTION, POWDER, LYOPHILIZED, FOR SOLUTION INTRAVENOUS
Status: DISCONTINUED
Start: 2025-05-14 | End: 2025-05-14 | Stop reason: WASHOUT

## 2025-05-14 RX ORDER — ONDANSETRON 4 MG/1
4 TABLET, ORALLY DISINTEGRATING ORAL EVERY 8 HOURS PRN
Status: CANCELLED | OUTPATIENT
Start: 2025-05-14

## 2025-05-14 RX ORDER — SODIUM CHLORIDE 0.9 % (FLUSH) 0.9 %
5-40 SYRINGE (ML) INJECTION PRN
Status: DISCONTINUED | OUTPATIENT
Start: 2025-05-14 | End: 2025-05-14 | Stop reason: HOSPADM

## 2025-05-14 RX ORDER — MIDAZOLAM HYDROCHLORIDE 2 MG/2ML
2 INJECTION, SOLUTION INTRAMUSCULAR; INTRAVENOUS
Status: DISCONTINUED | OUTPATIENT
Start: 2025-05-14 | End: 2025-05-14 | Stop reason: HOSPADM

## 2025-05-14 RX ORDER — TERIFLUNOMIDE 14 MG/1
14 TABLET, FILM COATED ORAL NIGHTLY
Status: DISCONTINUED | OUTPATIENT
Start: 2025-05-14 | End: 2025-05-15 | Stop reason: HOSPADM

## 2025-05-14 RX ORDER — ACETAMINOPHEN 325 MG/1
650 TABLET ORAL EVERY 6 HOURS PRN
Status: CANCELLED | OUTPATIENT
Start: 2025-05-14

## 2025-05-14 RX ORDER — SODIUM CHLORIDE 9 MG/ML
INJECTION, SOLUTION INTRAVENOUS PRN
Status: DISCONTINUED | OUTPATIENT
Start: 2025-05-14 | End: 2025-05-14 | Stop reason: HOSPADM

## 2025-05-14 RX ORDER — ACETAMINOPHEN 650 MG/1
650 SUPPOSITORY RECTAL EVERY 6 HOURS PRN
Status: CANCELLED | OUTPATIENT
Start: 2025-05-14

## 2025-05-14 RX ORDER — HYDRALAZINE HYDROCHLORIDE 20 MG/ML
10 INJECTION INTRAMUSCULAR; INTRAVENOUS
Status: CANCELLED | OUTPATIENT
Start: 2025-05-14

## 2025-05-14 RX ORDER — POTASSIUM CHLORIDE 7.45 MG/ML
10 INJECTION INTRAVENOUS PRN
Status: CANCELLED | OUTPATIENT
Start: 2025-05-14

## 2025-05-14 RX ORDER — POLYETHYLENE GLYCOL 3350 17 G/17G
17 POWDER, FOR SOLUTION ORAL DAILY PRN
Status: CANCELLED | OUTPATIENT
Start: 2025-05-14

## 2025-05-14 RX ORDER — FENTANYL CITRATE 50 UG/ML
INJECTION, SOLUTION INTRAMUSCULAR; INTRAVENOUS
Status: DISCONTINUED
Start: 2025-05-14 | End: 2025-05-15 | Stop reason: HOSPADM

## 2025-05-14 RX ADMIN — PRIMIDONE 100 MG: 50 TABLET ORAL at 21:01

## 2025-05-14 RX ADMIN — ACETAMINOPHEN 650 MG: 325 TABLET ORAL at 05:58

## 2025-05-14 RX ADMIN — TERIFLUNOMIDE 14 MG: 14 TABLET, FILM COATED ORAL at 23:12

## 2025-05-14 RX ADMIN — OXYCODONE HYDROCHLORIDE 5 MG: 5 TABLET ORAL at 23:19

## 2025-05-14 RX ADMIN — SODIUM CHLORIDE, PRESERVATIVE FREE 10 ML: 5 INJECTION INTRAVENOUS at 09:54

## 2025-05-14 RX ADMIN — GABAPENTIN 600 MG: 300 CAPSULE ORAL at 21:00

## 2025-05-14 RX ADMIN — OXYCODONE HYDROCHLORIDE 5 MG: 5 TABLET ORAL at 00:26

## 2025-05-14 RX ADMIN — MORPHINE SULFATE 2 MG: 2 INJECTION, SOLUTION INTRAMUSCULAR; INTRAVENOUS at 19:10

## 2025-05-14 RX ADMIN — ACETAMINOPHEN 650 MG: 325 TABLET ORAL at 00:26

## 2025-05-14 RX ADMIN — OXYCODONE HYDROCHLORIDE 5 MG: 5 TABLET ORAL at 10:11

## 2025-05-14 RX ADMIN — SULFASALAZINE 500 MG: 500 TABLET ORAL at 21:00

## 2025-05-14 RX ADMIN — ACETAMINOPHEN 650 MG: 325 TABLET ORAL at 23:18

## 2025-05-14 RX ADMIN — OXYCODONE HYDROCHLORIDE 5 MG: 5 TABLET ORAL at 05:59

## 2025-05-14 ASSESSMENT — PAIN SCALES - GENERAL
PAINLEVEL_OUTOF10: 7
PAINLEVEL_OUTOF10: 9
PAINLEVEL_OUTOF10: 5
PAINLEVEL_OUTOF10: 3
PAINLEVEL_OUTOF10: 5
PAINLEVEL_OUTOF10: 10
PAINLEVEL_OUTOF10: 9

## 2025-05-14 ASSESSMENT — PAIN DESCRIPTION - ORIENTATION
ORIENTATION: RIGHT

## 2025-05-14 ASSESSMENT — PAIN - FUNCTIONAL ASSESSMENT: PAIN_FUNCTIONAL_ASSESSMENT: 0-10

## 2025-05-14 ASSESSMENT — PAIN DESCRIPTION - LOCATION
LOCATION: ANKLE

## 2025-05-14 ASSESSMENT — PAIN SCALES - WONG BAKER
WONGBAKER_NUMERICALRESPONSE: NO HURT
WONGBAKER_NUMERICALRESPONSE: NO HURT

## 2025-05-14 ASSESSMENT — PAIN DESCRIPTION - DESCRIPTORS: DESCRIPTORS: SHARP

## 2025-05-14 NOTE — PLAN OF CARE
Problem: Safety - Adult  Goal: Free from fall injury  5/14/2025 0310 by Lele Garcia LPN  Outcome: Progressing     Problem: Discharge Planning  Goal: Discharge to home or other facility with appropriate resources  5/14/2025 0310 by Lele Garcia LPN  Outcome: Progressing     Problem: Pain  Goal: Verbalizes/displays adequate comfort level or baseline comfort level  5/14/2025 0310 by Lele Garcia LPN  Outcome: Progressing     Problem: ABCDS Injury Assessment  Goal: Absence of physical injury  5/14/2025 0310 by Lele Garcia LPN  Outcome: Progressing     Problem: Skin/Tissue Integrity  Goal: Skin integrity remains intact  Description: 1.  Monitor for areas of redness and/or skin breakdown2.  Assess vascular access sites hourly3.  Every 4-6 hours minimum:  Change oxygen saturation probe site4.  Every 4-6 hours:  If on nasal continuous positive airway pressure, respiratory therapy assess nares and determine need for appliance change or resting period  Outcome: Progressing

## 2025-05-14 NOTE — PLAN OF CARE
Problem: Safety - Adult  Goal: Free from fall injury  5/14/2025 1044 by Tanika Louie RN  Outcome: Progressing  5/14/2025 0310 by Lele Garcia LPN  Outcome: Progressing     Problem: Discharge Planning  Goal: Discharge to home or other facility with appropriate resources  5/14/2025 1044 by Tanika Louie RN  Outcome: Progressing  5/14/2025 0310 by Lele Garcia LPN  Outcome: Progressing     Problem: Pain  Goal: Verbalizes/displays adequate comfort level or baseline comfort level  5/14/2025 1044 by Tanika Louie RN  Outcome: Progressing  5/14/2025 0310 by Lele Garcia LPN  Outcome: Progressing     Problem: ABCDS Injury Assessment  Goal: Absence of physical injury  5/14/2025 1044 by Tanika Louie RN  Outcome: Progressing  5/14/2025 0310 by Lele Garcia LPN  Outcome: Progressing     Problem: Skin/Tissue Integrity  Goal: Skin integrity remains intact  Description: 1.  Monitor for areas of redness and/or skin breakdown2.  Assess vascular access sites hourly3.  Every 4-6 hours minimum:  Change oxygen saturation probe site4.  Every 4-6 hours:  If on nasal continuous positive airway pressure, respiratory therapy assess nares and determine need for appliance change or resting period  5/14/2025 1044 by Tanika Louie RN  Outcome: Progressing  5/14/2025 0310 by Lele Garcia LPN  Outcome: Progressing

## 2025-05-14 NOTE — ANESTHESIA PRE PROCEDURE
Evaluation  Patient summary reviewed and Nursing notes reviewed   no history of anesthetic complications:   Airway: Mallampati: III  TM distance: >3 FB   Neck ROM: full  Mouth opening: > = 3 FB   Dental: normal exam         Pulmonary:Negative Pulmonary ROS and normal exam  breath sounds clear to auscultation                             Cardiovascular:  Exercise tolerance: good (>4 METS)  (+) hypertension: no interval change        Rhythm: regular  Rate: normal  Echocardiogram reviewed         Beta Blocker:  Dose within 24 Hrs      ROS comment: 1/7/2025 Echo: ·  Left Ventricle: Normal left ventricular systolic function. EF by 2D Simpsons Biplane is 62%. Left ventricle size is normal. Normal wall thickness. Normal wall motion. Normal diastolic function.  ·  Aortic Valve: Trileaflet valve. Mildly thickened noncoronary cusp.  ·  Mitral Valve: Mild regurgitation.  ·  Tricuspid Valve: Mild regurgitation. The estimated RVSP is 38 mmHg.  ·  Aorta: Normal sized aortic root. Mildly dilated ascending aorta. Ao ascending diameter is 4.0 cm.       Neuro/Psych:   (+) neuromuscular disease: multiple sclerosisdepression/anxiety              ROS comment: Tremor GI/Hepatic/Renal:   (+) liver disease:, morbid obesity         ROS comment: Crohn disease .   Endo/Other:                      ROS comment: fracture of right ankle Abdominal: normal exam      Abdomen: soft.      Vascular: negative vascular ROS.         Other Findings:         Anesthesia Plan      general     ASA 3 - emergent     (GA, popliteal/adductor canal block)  Induction: intravenous.    MIPS: Postoperative opioids intended and Prophylactic antiemetics administered.  Anesthetic plan and risks discussed with patient.      Plan discussed with CRNA.                  Darlin Major MD   5/14/2025

## 2025-05-14 NOTE — CARE COORDINATION
Case Management Assessment  Initial Evaluation    Date/Time of Evaluation: 5/14/2025 5:08 PM  Assessment Completed by: Zarina Price    If patient is discharged prior to next notation, then this note serves as note for discharge by case management.    Patient Name: Ana Kang                   YOB: 1955  Diagnosis: Fall, initial encounter [W19.XXXA]  Closed bimalleolar fracture of right ankle, initial encounter [S82.841A]  Ankle fracture, bimalleolar, closed, right, initial encounter [S82.841A]                   Date / Time: 5/12/2025  8:29 PM    Patient Admission Status: Inpatient   Readmission Risk (Low < 19, Mod (19-27), High > 27): Readmission Risk Score: 13.5    Current PCP: Liliam Edouard APRN - CNP  PCP verified by CM? Yes    Chart Reviewed: Yes      History Provided by: Patient  Patient Orientation: Alert and Oriented, Person, Place    Patient Cognition: Alert    Hospitalization in the last 30 days (Readmission):  No    If yes, Readmission Assessment in  Navigator will be completed.    Advance Directives:      Code Status: Full Code   Patient's Primary Decision Maker is: Legal Next of Kin    Primary Decision Maker: Sid Kang - Spouse - [phone]    Discharge Planning:    Patient lives with: Spouse/Significant Other, Children Type of Home: House  Primary Care Giver: Self  Patient Support Systems include: Family Members   Current Financial resources:    Current community resources:    Current services prior to admission: Durable Medical Equipment            Current DME: Wheelchair, Walker            Type of Home Care services:  OT, PT    ADLS  Prior functional level: Assistance with the following:, Bathing  Current functional level: Assistance with the following:, Bathing    PT AM-PAC:   /24  OT AM-PAC:   /24    Family can provide assistance at DC: Yes  Would you like Case Management to discuss the discharge plan with any other family members/significant others, and

## 2025-05-14 NOTE — PROGRESS NOTES
[facility]  Office: [phone]  Roberto Steven DO, Jordan Jimenez DO, Willie Byers DO, Miguelangel Baird DO, Patrick Quinn MD, Yaritza Burger MD, Jocelynn Medel MD, Leanna Cummings MD,  Marco Jones MD, David Patino MD, Sophie Roblero MD,  Zofia Pond DO, Ketty Madison MD, Chito Holley MD, Christos Steven DO, Francisca Miner MD,  Darci Barbour DO, Maryann Cuenca MD, Mitzy Bueno MD, Reina Sebastian MD,  Sacha Lofton MD, Kati Abraham MD, Cecily Stout MD, Jaswant Abel MD, Josh Fine MD, Santos Araya MD, Eliezer Perez DO, Marisol Antunez MD, Tutu Tyler MD, Zofia Martinez MD, Mohsin Reza, MD, Lawanda Yadav MD, Shirley Waterhouse, CNP,  Aletha Saleh, CNP, Eliezer Gutierrez, CNP,  Nayeli Laird, DNP, Verna Carrera, CNP, Savannah Gonzales, CNP, Lilly Crawford, CNP, Yelitza Henley, CNP, Becca Hawley, PA-C, Barby Howard, CNP, Vicki Reese, CNP,  Heather Santos, CNP, Christina Fuchs, CNP, Sourav Turpin, PA-C, Bertha Barrios, CNP,  Susan Byers, CNS, Alina Cordero, CNP, Fiona Servin, CNP,   Gianna Jones, CNP         [facility]   IN-PATIENT SERVICE   [facility]    Progress Note    5/14/2025    7:25 AM    Name:   Ana Kang  MRN:     [id number]     Acct:      [id number]   Room:   313/313-01  IP Day:  2  Admit Date:  5/12/2025  8:29 PM    PCP:   Liliam Edouard APRN - CNP  Code Status:  Full Code    Subjective:     Patient was seen in follow-up for right ankle fracture. She reports feeling \"okay\" but continues to endorse right ankle pain and swelling. Orthopedic surgery is following; plan for OR today.     Medications:     Allergies:  No Known Allergies    Current Meds:   Scheduled Meds:    ceFAZolin  2,000 mg IntraVENous On Call to OR    sodium chloride flush  5-40 mL IntraVENous 2 times per day    acetaminophen  650 mg Oral Q6H    propranolol  60 mg Oral Daily    trospium  20 mg Oral BID AC    amLODIPine  5 mg Oral Daily    Diroximel Fumarate

## 2025-05-15 ENCOUNTER — APPOINTMENT (OUTPATIENT)
Dept: GENERAL RADIOLOGY | Age: 70
DRG: 493 | End: 2025-05-15
Attending: STUDENT IN AN ORGANIZED HEALTH CARE EDUCATION/TRAINING PROGRAM
Payer: MEDICARE

## 2025-05-15 ENCOUNTER — ANESTHESIA (OUTPATIENT)
Dept: OPERATING ROOM | Age: 70
DRG: 493 | End: 2025-05-15
Payer: MEDICARE

## 2025-05-15 ENCOUNTER — HOSPITAL ENCOUNTER (INPATIENT)
Age: 70
LOS: 6 days | Discharge: NURSING, REHAB OR LONG-TERM CARE | DRG: 493 | End: 2025-05-21
Attending: STUDENT IN AN ORGANIZED HEALTH CARE EDUCATION/TRAINING PROGRAM | Admitting: STUDENT IN AN ORGANIZED HEALTH CARE EDUCATION/TRAINING PROGRAM
Payer: MEDICARE

## 2025-05-15 ENCOUNTER — ANESTHESIA EVENT (OUTPATIENT)
Dept: OPERATING ROOM | Age: 70
DRG: 493 | End: 2025-05-15
Payer: MEDICARE

## 2025-05-15 DIAGNOSIS — G25.2 RUBRAL TREMOR: ICD-10-CM

## 2025-05-15 DIAGNOSIS — S82.851A CLOSED DISPLACED TRIMALLEOLAR FRACTURE OF RIGHT ANKLE, INITIAL ENCOUNTER: ICD-10-CM

## 2025-05-15 DIAGNOSIS — M79.2 NEUROPATHIC PAIN: ICD-10-CM

## 2025-05-15 DIAGNOSIS — G35 MULTIPLE SCLEROSIS (HCC): ICD-10-CM

## 2025-05-15 DIAGNOSIS — S82.841A ANKLE FRACTURE, BIMALLEOLAR, CLOSED, RIGHT, INITIAL ENCOUNTER: Primary | ICD-10-CM

## 2025-05-15 PROBLEM — S82.891A CLOSED RIGHT ANKLE FRACTURE, INITIAL ENCOUNTER: Status: ACTIVE | Noted: 2025-05-15

## 2025-05-15 PROBLEM — N32.81 OVERACTIVE BLADDER: Status: ACTIVE | Noted: 2025-05-15

## 2025-05-15 PROBLEM — R25.1 TREMOR: Status: ACTIVE | Noted: 2025-05-15

## 2025-05-15 LAB — 25(OH)D3 SERPL-MCNC: 52.2 NG/ML (ref 30–100)

## 2025-05-15 PROCEDURE — 3700000001 HC ADD 15 MINUTES (ANESTHESIA): Performed by: STUDENT IN AN ORGANIZED HEALTH CARE EDUCATION/TRAINING PROGRAM

## 2025-05-15 PROCEDURE — 3600000014 HC SURGERY LEVEL 4 ADDTL 15MIN: Performed by: STUDENT IN AN ORGANIZED HEALTH CARE EDUCATION/TRAINING PROGRAM

## 2025-05-15 PROCEDURE — 7100000000 HC PACU RECOVERY - FIRST 15 MIN: Performed by: STUDENT IN AN ORGANIZED HEALTH CARE EDUCATION/TRAINING PROGRAM

## 2025-05-15 PROCEDURE — 2709999900 HC NON-CHARGEABLE SUPPLY: Performed by: STUDENT IN AN ORGANIZED HEALTH CARE EDUCATION/TRAINING PROGRAM

## 2025-05-15 PROCEDURE — 2500000003 HC RX 250 WO HCPCS: Performed by: NURSE ANESTHETIST, CERTIFIED REGISTERED

## 2025-05-15 PROCEDURE — 82306 VITAMIN D 25 HYDROXY: CPT

## 2025-05-15 PROCEDURE — 6370000000 HC RX 637 (ALT 250 FOR IP): Performed by: STUDENT IN AN ORGANIZED HEALTH CARE EDUCATION/TRAINING PROGRAM

## 2025-05-15 PROCEDURE — 27822 TREATMENT OF ANKLE FRACTURE: CPT | Performed by: STUDENT IN AN ORGANIZED HEALTH CARE EDUCATION/TRAINING PROGRAM

## 2025-05-15 PROCEDURE — 6360000002 HC RX W HCPCS: Performed by: STUDENT IN AN ORGANIZED HEALTH CARE EDUCATION/TRAINING PROGRAM

## 2025-05-15 PROCEDURE — APPSS30 APP SPLIT SHARED TIME 16-30 MINUTES

## 2025-05-15 PROCEDURE — C1769 GUIDE WIRE: HCPCS | Performed by: STUDENT IN AN ORGANIZED HEALTH CARE EDUCATION/TRAINING PROGRAM

## 2025-05-15 PROCEDURE — 94761 N-INVAS EAR/PLS OXIMETRY MLT: CPT

## 2025-05-15 PROCEDURE — 3600000004 HC SURGERY LEVEL 4 BASE: Performed by: STUDENT IN AN ORGANIZED HEALTH CARE EDUCATION/TRAINING PROGRAM

## 2025-05-15 PROCEDURE — 6370000000 HC RX 637 (ALT 250 FOR IP)

## 2025-05-15 PROCEDURE — 0QSG04Z REPOSITION RIGHT TIBIA WITH INTERNAL FIXATION DEVICE, OPEN APPROACH: ICD-10-PCS | Performed by: STUDENT IN AN ORGANIZED HEALTH CARE EDUCATION/TRAINING PROGRAM

## 2025-05-15 PROCEDURE — 3700000000 HC ANESTHESIA ATTENDED CARE: Performed by: STUDENT IN AN ORGANIZED HEALTH CARE EDUCATION/TRAINING PROGRAM

## 2025-05-15 PROCEDURE — 99223 1ST HOSP IP/OBS HIGH 75: CPT | Performed by: STUDENT IN AN ORGANIZED HEALTH CARE EDUCATION/TRAINING PROGRAM

## 2025-05-15 PROCEDURE — 6360000002 HC RX W HCPCS: Performed by: NURSE ANESTHETIST, CERTIFIED REGISTERED

## 2025-05-15 PROCEDURE — 99222 1ST HOSP IP/OBS MODERATE 55: CPT | Performed by: STUDENT IN AN ORGANIZED HEALTH CARE EDUCATION/TRAINING PROGRAM

## 2025-05-15 PROCEDURE — 2500000003 HC RX 250 WO HCPCS: Performed by: STUDENT IN AN ORGANIZED HEALTH CARE EDUCATION/TRAINING PROGRAM

## 2025-05-15 PROCEDURE — 77071 MNL APPL STRS JT RADIOGRAPHY: CPT | Performed by: STUDENT IN AN ORGANIZED HEALTH CARE EDUCATION/TRAINING PROGRAM

## 2025-05-15 PROCEDURE — 73610 X-RAY EXAM OF ANKLE: CPT

## 2025-05-15 PROCEDURE — 0QSJ06Z REPOSITION RIGHT FIBULA WITH INTRAMEDULLARY INTERNAL FIXATION DEVICE, OPEN APPROACH: ICD-10-PCS | Performed by: STUDENT IN AN ORGANIZED HEALTH CARE EDUCATION/TRAINING PROGRAM

## 2025-05-15 PROCEDURE — 27829 TREAT LOWER LEG JOINT: CPT | Performed by: STUDENT IN AN ORGANIZED HEALTH CARE EDUCATION/TRAINING PROGRAM

## 2025-05-15 PROCEDURE — 2720000010 HC SURG SUPPLY STERILE: Performed by: STUDENT IN AN ORGANIZED HEALTH CARE EDUCATION/TRAINING PROGRAM

## 2025-05-15 PROCEDURE — 2580000003 HC RX 258

## 2025-05-15 PROCEDURE — 36415 COLL VENOUS BLD VENIPUNCTURE: CPT

## 2025-05-15 PROCEDURE — 7100000001 HC PACU RECOVERY - ADDTL 15 MIN: Performed by: STUDENT IN AN ORGANIZED HEALTH CARE EDUCATION/TRAINING PROGRAM

## 2025-05-15 PROCEDURE — 6370000000 HC RX 637 (ALT 250 FOR IP): Performed by: NURSE PRACTITIONER

## 2025-05-15 PROCEDURE — C1713 ANCHOR/SCREW BN/BN,TIS/BN: HCPCS | Performed by: STUDENT IN AN ORGANIZED HEALTH CARE EDUCATION/TRAINING PROGRAM

## 2025-05-15 PROCEDURE — 1200000000 HC SEMI PRIVATE

## 2025-05-15 PROCEDURE — 2580000003 HC RX 258: Performed by: NURSE PRACTITIONER

## 2025-05-15 PROCEDURE — 0SSF04Z REPOSITION RIGHT ANKLE JOINT WITH INTERNAL FIXATION DEVICE, OPEN APPROACH: ICD-10-PCS | Performed by: STUDENT IN AN ORGANIZED HEALTH CARE EDUCATION/TRAINING PROGRAM

## 2025-05-15 PROCEDURE — 6360000002 HC RX W HCPCS

## 2025-05-15 DEVICE — SCREW BONE L20MM DIA3MM ANK S STL CANC NONLOCKING LO PROF: Type: IMPLANTABLE DEVICE | Site: ANKLE | Status: FUNCTIONAL

## 2025-05-15 DEVICE — SCREW BNE L14MM DIA3MM CANC S STL NONLOCKING LO PROF FOR: Type: IMPLANTABLE DEVICE | Site: ANKLE | Status: FUNCTIONAL

## 2025-05-15 DEVICE — IMPLANTABLE DEVICE: Type: IMPLANTABLE DEVICE | Site: ANKLE | Status: FUNCTIONAL

## 2025-05-15 DEVICE — 3.8X130MM FIBULA NAIL, RIGHT
Type: IMPLANTABLE DEVICE | Site: ANKLE | Status: FUNCTIONAL
Brand: ARTHREX®

## 2025-05-15 DEVICE — SCREW BNE L50MM DIA3.5MM CORT FT ANK S STL NONLOCKING LO: Type: IMPLANTABLE DEVICE | Site: ANKLE | Status: FUNCTIONAL

## 2025-05-15 DEVICE — SCREW BNE L16MM DIA3MM CANC ANK S STL NONLOCKING LO PROF: Type: IMPLANTABLE DEVICE | Site: ANKLE | Status: FUNCTIONAL

## 2025-05-15 RX ORDER — PROPOFOL 10 MG/ML
INJECTION, EMULSION INTRAVENOUS
Status: DISCONTINUED | OUTPATIENT
Start: 2025-05-15 | End: 2025-05-15 | Stop reason: SDUPTHER

## 2025-05-15 RX ORDER — LISINOPRIL 20 MG/1
20 TABLET ORAL DAILY
Status: DISCONTINUED | OUTPATIENT
Start: 2025-05-15 | End: 2025-05-20

## 2025-05-15 RX ORDER — MAGNESIUM SULFATE 1 G/100ML
1000 INJECTION INTRAVENOUS PRN
Status: DISCONTINUED | OUTPATIENT
Start: 2025-05-15 | End: 2025-05-21 | Stop reason: HOSPADM

## 2025-05-15 RX ORDER — FENTANYL CITRATE 50 UG/ML
INJECTION, SOLUTION INTRAMUSCULAR; INTRAVENOUS
Status: DISCONTINUED | OUTPATIENT
Start: 2025-05-15 | End: 2025-05-15 | Stop reason: SDUPTHER

## 2025-05-15 RX ORDER — SODIUM CHLORIDE 9 MG/ML
INJECTION, SOLUTION INTRAVENOUS PRN
Status: DISCONTINUED | OUTPATIENT
Start: 2025-05-15 | End: 2025-05-15 | Stop reason: HOSPADM

## 2025-05-15 RX ORDER — VANCOMYCIN HYDROCHLORIDE 1 G/20ML
INJECTION, POWDER, LYOPHILIZED, FOR SOLUTION INTRAVENOUS PRN
Status: DISCONTINUED | OUTPATIENT
Start: 2025-05-15 | End: 2025-05-15 | Stop reason: ALTCHOICE

## 2025-05-15 RX ORDER — PROPRANOLOL HYDROCHLORIDE 60 MG/1
60 CAPSULE, EXTENDED RELEASE ORAL DAILY
Status: DISCONTINUED | OUTPATIENT
Start: 2025-05-15 | End: 2025-05-21 | Stop reason: HOSPADM

## 2025-05-15 RX ORDER — PROCHLORPERAZINE EDISYLATE 5 MG/ML
5 INJECTION INTRAMUSCULAR; INTRAVENOUS
Status: DISCONTINUED | OUTPATIENT
Start: 2025-05-15 | End: 2025-05-15 | Stop reason: HOSPADM

## 2025-05-15 RX ORDER — SODIUM CHLORIDE 9 MG/ML
INJECTION, SOLUTION INTRAVENOUS CONTINUOUS
Status: DISCONTINUED | OUTPATIENT
Start: 2025-05-15 | End: 2025-05-16

## 2025-05-15 RX ORDER — ROCURONIUM BROMIDE 10 MG/ML
INJECTION, SOLUTION INTRAVENOUS
Status: DISCONTINUED | OUTPATIENT
Start: 2025-05-15 | End: 2025-05-15 | Stop reason: SDUPTHER

## 2025-05-15 RX ORDER — NALOXONE HYDROCHLORIDE 0.4 MG/ML
INJECTION, SOLUTION INTRAMUSCULAR; INTRAVENOUS; SUBCUTANEOUS PRN
Status: DISCONTINUED | OUTPATIENT
Start: 2025-05-15 | End: 2025-05-15 | Stop reason: HOSPADM

## 2025-05-15 RX ORDER — OXYCODONE HYDROCHLORIDE 5 MG/1
5 TABLET ORAL EVERY 4 HOURS PRN
Refills: 0 | Status: DISCONTINUED | OUTPATIENT
Start: 2025-05-15 | End: 2025-05-20

## 2025-05-15 RX ORDER — VITAMIN B COMPLEX
4000 TABLET ORAL DAILY
Status: DISCONTINUED | OUTPATIENT
Start: 2025-05-15 | End: 2025-05-21 | Stop reason: HOSPADM

## 2025-05-15 RX ORDER — ONDANSETRON 2 MG/ML
INJECTION INTRAMUSCULAR; INTRAVENOUS
Status: DISCONTINUED | OUTPATIENT
Start: 2025-05-15 | End: 2025-05-15 | Stop reason: SDUPTHER

## 2025-05-15 RX ORDER — SODIUM CHLORIDE 0.9 % (FLUSH) 0.9 %
10 SYRINGE (ML) INJECTION PRN
Status: DISCONTINUED | OUTPATIENT
Start: 2025-05-15 | End: 2025-05-21 | Stop reason: HOSPADM

## 2025-05-15 RX ORDER — MAGNESIUM HYDROXIDE 1200 MG/15ML
LIQUID ORAL CONTINUOUS PRN
Status: COMPLETED | OUTPATIENT
Start: 2025-05-15 | End: 2025-05-15

## 2025-05-15 RX ORDER — POLYETHYLENE GLYCOL 3350 17 G/17G
17 POWDER, FOR SOLUTION ORAL DAILY PRN
Status: DISCONTINUED | OUTPATIENT
Start: 2025-05-15 | End: 2025-05-21 | Stop reason: HOSPADM

## 2025-05-15 RX ORDER — TOBRAMYCIN 1.2 G/30ML
INJECTION, POWDER, LYOPHILIZED, FOR SOLUTION INTRAVENOUS PRN
Status: DISCONTINUED | OUTPATIENT
Start: 2025-05-15 | End: 2025-05-15 | Stop reason: ALTCHOICE

## 2025-05-15 RX ORDER — SODIUM CHLORIDE 0.9 % (FLUSH) 0.9 %
5-40 SYRINGE (ML) INJECTION EVERY 12 HOURS SCHEDULED
Status: DISCONTINUED | OUTPATIENT
Start: 2025-05-15 | End: 2025-05-15 | Stop reason: HOSPADM

## 2025-05-15 RX ORDER — SODIUM CHLORIDE 0.9 % (FLUSH) 0.9 %
5-40 SYRINGE (ML) INJECTION EVERY 12 HOURS SCHEDULED
Status: DISCONTINUED | OUTPATIENT
Start: 2025-05-15 | End: 2025-05-21 | Stop reason: HOSPADM

## 2025-05-15 RX ORDER — LISINOPRIL AND HYDROCHLOROTHIAZIDE 20; 25 MG/1; MG/1
1 TABLET ORAL DAILY
Status: DISCONTINUED | OUTPATIENT
Start: 2025-05-15 | End: 2025-05-15 | Stop reason: CLARIF

## 2025-05-15 RX ORDER — DIPHENHYDRAMINE HYDROCHLORIDE 50 MG/ML
12.5 INJECTION, SOLUTION INTRAMUSCULAR; INTRAVENOUS
Status: DISCONTINUED | OUTPATIENT
Start: 2025-05-15 | End: 2025-05-15 | Stop reason: HOSPADM

## 2025-05-15 RX ORDER — POTASSIUM CHLORIDE 1500 MG/1
40 TABLET, EXTENDED RELEASE ORAL PRN
Status: DISCONTINUED | OUTPATIENT
Start: 2025-05-15 | End: 2025-05-21 | Stop reason: HOSPADM

## 2025-05-15 RX ORDER — EPHEDRINE SULFATE/0.9% NACL/PF 25 MG/5 ML
SYRINGE (ML) INTRAVENOUS
Status: DISCONTINUED | OUTPATIENT
Start: 2025-05-15 | End: 2025-05-15 | Stop reason: SDUPTHER

## 2025-05-15 RX ORDER — LABETALOL HYDROCHLORIDE 5 MG/ML
10 INJECTION, SOLUTION INTRAVENOUS
Status: DISCONTINUED | OUTPATIENT
Start: 2025-05-15 | End: 2025-05-15 | Stop reason: HOSPADM

## 2025-05-15 RX ORDER — HYDRALAZINE HYDROCHLORIDE 20 MG/ML
10 INJECTION INTRAMUSCULAR; INTRAVENOUS
Status: DISCONTINUED | OUTPATIENT
Start: 2025-05-15 | End: 2025-05-15 | Stop reason: HOSPADM

## 2025-05-15 RX ORDER — POTASSIUM CHLORIDE 1500 MG/1
20 TABLET, EXTENDED RELEASE ORAL DAILY
Status: ON HOLD | COMMUNITY
End: 2025-05-19 | Stop reason: HOSPADM

## 2025-05-15 RX ORDER — SODIUM CHLORIDE 0.9 % (FLUSH) 0.9 %
5-40 SYRINGE (ML) INJECTION PRN
Status: DISCONTINUED | OUTPATIENT
Start: 2025-05-15 | End: 2025-05-15 | Stop reason: HOSPADM

## 2025-05-15 RX ORDER — IPRATROPIUM BROMIDE AND ALBUTEROL SULFATE 2.5; .5 MG/3ML; MG/3ML
1 SOLUTION RESPIRATORY (INHALATION)
Status: DISCONTINUED | OUTPATIENT
Start: 2025-05-15 | End: 2025-05-15 | Stop reason: HOSPADM

## 2025-05-15 RX ORDER — SODIUM CHLORIDE 9 MG/ML
INJECTION, SOLUTION INTRAVENOUS PRN
Status: DISCONTINUED | OUTPATIENT
Start: 2025-05-15 | End: 2025-05-21 | Stop reason: HOSPADM

## 2025-05-15 RX ORDER — SULFASALAZINE 500 MG/1
500 TABLET ORAL 4 TIMES DAILY
Status: DISCONTINUED | OUTPATIENT
Start: 2025-05-15 | End: 2025-05-21 | Stop reason: HOSPADM

## 2025-05-15 RX ORDER — FOLIC ACID 0.8 MG
800 TABLET ORAL DAILY
COMMUNITY

## 2025-05-15 RX ORDER — HYDROCHLOROTHIAZIDE 25 MG/1
25 TABLET ORAL DAILY
Status: DISCONTINUED | OUTPATIENT
Start: 2025-05-15 | End: 2025-05-20

## 2025-05-15 RX ORDER — PHENYLEPHRINE HCL IN 0.9% NACL 1 MG/10 ML
SYRINGE (ML) INTRAVENOUS
Status: DISCONTINUED | OUTPATIENT
Start: 2025-05-15 | End: 2025-05-15 | Stop reason: SDUPTHER

## 2025-05-15 RX ORDER — ACETAMINOPHEN 650 MG/1
650 SUPPOSITORY RECTAL EVERY 6 HOURS PRN
Status: DISCONTINUED | OUTPATIENT
Start: 2025-05-15 | End: 2025-05-21 | Stop reason: HOSPADM

## 2025-05-15 RX ORDER — TRANEXAMIC ACID 100 MG/ML
INJECTION, SOLUTION INTRAVENOUS
Status: DISCONTINUED | OUTPATIENT
Start: 2025-05-15 | End: 2025-05-15 | Stop reason: SDUPTHER

## 2025-05-15 RX ORDER — DEXAMETHASONE SODIUM PHOSPHATE 10 MG/ML
INJECTION, SOLUTION INTRA-ARTICULAR; INTRALESIONAL; INTRAMUSCULAR; INTRAVENOUS; SOFT TISSUE
Status: DISCONTINUED | OUTPATIENT
Start: 2025-05-15 | End: 2025-05-15 | Stop reason: SDUPTHER

## 2025-05-15 RX ORDER — TROSPIUM CHLORIDE 20 MG/1
20 TABLET, FILM COATED ORAL
Status: DISCONTINUED | OUTPATIENT
Start: 2025-05-15 | End: 2025-05-21 | Stop reason: HOSPADM

## 2025-05-15 RX ORDER — GABAPENTIN 300 MG/1
600 CAPSULE ORAL 3 TIMES DAILY
Status: DISCONTINUED | OUTPATIENT
Start: 2025-05-15 | End: 2025-05-21 | Stop reason: HOSPADM

## 2025-05-15 RX ORDER — PRIMIDONE 50 MG/1
100 TABLET ORAL 2 TIMES DAILY
Status: DISCONTINUED | OUTPATIENT
Start: 2025-05-15 | End: 2025-05-21 | Stop reason: HOSPADM

## 2025-05-15 RX ORDER — POTASSIUM CHLORIDE 7.45 MG/ML
10 INJECTION INTRAVENOUS PRN
Status: DISCONTINUED | OUTPATIENT
Start: 2025-05-15 | End: 2025-05-21 | Stop reason: HOSPADM

## 2025-05-15 RX ORDER — ONDANSETRON 2 MG/ML
4 INJECTION INTRAMUSCULAR; INTRAVENOUS EVERY 6 HOURS PRN
Status: DISCONTINUED | OUTPATIENT
Start: 2025-05-15 | End: 2025-05-21 | Stop reason: HOSPADM

## 2025-05-15 RX ORDER — ACETAMINOPHEN 325 MG/1
650 TABLET ORAL EVERY 6 HOURS PRN
Status: DISCONTINUED | OUTPATIENT
Start: 2025-05-15 | End: 2025-05-21 | Stop reason: HOSPADM

## 2025-05-15 RX ORDER — ONDANSETRON 4 MG/1
4 TABLET, ORALLY DISINTEGRATING ORAL EVERY 8 HOURS PRN
Status: DISCONTINUED | OUTPATIENT
Start: 2025-05-15 | End: 2025-05-21 | Stop reason: HOSPADM

## 2025-05-15 RX ORDER — AMLODIPINE BESYLATE 5 MG/1
5 TABLET ORAL DAILY
Status: DISCONTINUED | OUTPATIENT
Start: 2025-05-15 | End: 2025-05-21 | Stop reason: HOSPADM

## 2025-05-15 RX ADMIN — FENTANYL CITRATE 50 MCG: 50 INJECTION, SOLUTION INTRAMUSCULAR; INTRAVENOUS at 11:47

## 2025-05-15 RX ADMIN — PROPRANOLOL HYDROCHLORIDE 60 MG: 60 CAPSULE, EXTENDED RELEASE ORAL at 09:13

## 2025-05-15 RX ADMIN — FENTANYL CITRATE 50 MCG: 50 INJECTION, SOLUTION INTRAMUSCULAR; INTRAVENOUS at 11:48

## 2025-05-15 RX ADMIN — FENTANYL CITRATE 50 MCG: 50 INJECTION, SOLUTION INTRAMUSCULAR; INTRAVENOUS at 12:55

## 2025-05-15 RX ADMIN — PAROXETINE 30 MG: 10 TABLET, FILM COATED ORAL at 09:13

## 2025-05-15 RX ADMIN — LISINOPRIL 20 MG: 20 TABLET ORAL at 09:11

## 2025-05-15 RX ADMIN — HYDROMORPHONE HYDROCHLORIDE 0.5 MG: 1 INJECTION, SOLUTION INTRAMUSCULAR; INTRAVENOUS; SUBCUTANEOUS at 14:07

## 2025-05-15 RX ADMIN — EPHEDRINE SULFATE 5 MG: 5 INJECTION INTRAVENOUS at 12:15

## 2025-05-15 RX ADMIN — SODIUM CHLORIDE: 0.9 INJECTION, SOLUTION INTRAVENOUS at 06:38

## 2025-05-15 RX ADMIN — ROCURONIUM BROMIDE 100 MG: 10 INJECTION, SOLUTION INTRAVENOUS at 11:50

## 2025-05-15 RX ADMIN — EPHEDRINE SULFATE 5 MG: 5 INJECTION INTRAVENOUS at 13:07

## 2025-05-15 RX ADMIN — Medication 100 MCG: at 12:13

## 2025-05-15 RX ADMIN — SUGAMMADEX 300 MG: 100 INJECTION, SOLUTION INTRAVENOUS at 13:30

## 2025-05-15 RX ADMIN — ONDANSETRON 4 MG: 2 INJECTION, SOLUTION INTRAMUSCULAR; INTRAVENOUS at 13:21

## 2025-05-15 RX ADMIN — PRIMIDONE 100 MG: 50 TABLET ORAL at 09:14

## 2025-05-15 RX ADMIN — Medication 30 MG: at 12:11

## 2025-05-15 RX ADMIN — PRIMIDONE 100 MG: 50 TABLET ORAL at 21:11

## 2025-05-15 RX ADMIN — EPHEDRINE SULFATE 5 MG: 5 INJECTION INTRAVENOUS at 12:13

## 2025-05-15 RX ADMIN — TRANEXAMIC ACID 1000 MG: 100 INJECTION, SOLUTION INTRAVENOUS at 12:23

## 2025-05-15 RX ADMIN — EPHEDRINE SULFATE 5 MG: 5 INJECTION INTRAVENOUS at 12:14

## 2025-05-15 RX ADMIN — OXYCODONE 5 MG: 5 TABLET ORAL at 19:03

## 2025-05-15 RX ADMIN — OXYCODONE 5 MG: 5 TABLET ORAL at 23:32

## 2025-05-15 RX ADMIN — PROPOFOL 200 MG: 10 INJECTION, EMULSION INTRAVENOUS at 11:48

## 2025-05-15 RX ADMIN — EPHEDRINE SULFATE 5 MG: 5 INJECTION INTRAVENOUS at 13:06

## 2025-05-15 RX ADMIN — SODIUM CHLORIDE: 0.9 INJECTION, SOLUTION INTRAVENOUS at 14:46

## 2025-05-15 RX ADMIN — Medication 4000 UNITS: at 09:11

## 2025-05-15 RX ADMIN — SULFASALAZINE 500 MG: 500 TABLET ORAL at 17:23

## 2025-05-15 RX ADMIN — Medication 2000 MG: at 12:11

## 2025-05-15 RX ADMIN — AMLODIPINE BESYLATE 5 MG: 5 TABLET ORAL at 09:09

## 2025-05-15 RX ADMIN — DEXAMETHASONE SODIUM PHOSPHATE 10 MG: 10 INJECTION INTRAMUSCULAR; INTRAVENOUS at 13:07

## 2025-05-15 RX ADMIN — Medication 2000 MG: at 21:11

## 2025-05-15 RX ADMIN — GABAPENTIN 600 MG: 300 CAPSULE ORAL at 21:11

## 2025-05-15 RX ADMIN — SULFASALAZINE 500 MG: 500 TABLET ORAL at 09:13

## 2025-05-15 RX ADMIN — SULFASALAZINE 500 MG: 500 TABLET ORAL at 21:11

## 2025-05-15 RX ADMIN — HYDROCHLOROTHIAZIDE 25 MG: 25 TABLET ORAL at 09:11

## 2025-05-15 RX ADMIN — Medication 50 MCG: at 12:17

## 2025-05-15 RX ADMIN — GABAPENTIN 600 MG: 300 CAPSULE ORAL at 09:09

## 2025-05-15 ASSESSMENT — PAIN SCALES - GENERAL
PAINLEVEL_OUTOF10: 8
PAINLEVEL_OUTOF10: 9
PAINLEVEL_OUTOF10: 7
PAINLEVEL_OUTOF10: 8
PAINLEVEL_OUTOF10: 5
PAINLEVEL_OUTOF10: 7
PAINLEVEL_OUTOF10: 0
PAINLEVEL_OUTOF10: 9
PAINLEVEL_OUTOF10: 9

## 2025-05-15 ASSESSMENT — PAIN DESCRIPTION - LOCATION
LOCATION: ANKLE

## 2025-05-15 ASSESSMENT — PAIN DESCRIPTION - ORIENTATION
ORIENTATION: RIGHT

## 2025-05-15 ASSESSMENT — PAIN DESCRIPTION - DESCRIPTORS
DESCRIPTORS: DISCOMFORT
DESCRIPTORS: ACHING
DESCRIPTORS: ACHING

## 2025-05-15 NOTE — BRIEF OP NOTE
Brief Postoperative Note      Patient: Ana Kang  YOB: 1955  MRN: [id number]    Date of Procedure: 5/15/2025    Pre-Op Diagnosis Codes:   Bimalleolar ankle fracture, right    Post-Op Diagnosis:   Trimalleolar ankle fracture, right       Procedure(s):  Right ankle medial malleolus ORIF  Right ankle fibular nail.  Syndesmotic fixation, right ankle    Surgeon(s):  Jai Murillo DO    Assistant:  Resident: Marco Fountain DO    Anesthesia: General    Estimated Blood Loss (mL): 10 cc    Fluids: 1000 cc crystalloid    TT: 64 mins    Complications: None    Specimens:   * No specimens in log *    Implants:  Implant Name Type Inv. Item Serial No.  Lot No. LRB No. Used Action   NAIL FIBULA RIGHT [id number] - [id number]  NAIL FIBULA RIGHT [id number]  [employer] [id number] Right 1 Implanted   SCREW BONE L12MM DIA3MM CANC S STL NONLOCKING LO PROF FOR - [id number]  SCREW BONE L12MM DIA3MM CANC S STL NONLOCKING LO PROF FOR  [employer]  Right 1 Implanted   SCREW BNE L16MM DIA3MM CANC ANK S STL NONLOCKING LO PROF - [id number]  SCREW BNE L16MM DIA3MM CANC ANK S STL NONLOCKING LO PROF  [employer]  Right 1 Implanted   SCREW BNE L14MM DIA3MM CANC S STL NONLOCKING LO PROF FOR - [id number]  SCREW BNE L14MM DIA3MM CANC S STL NONLOCKING LO PROF FOR  [employer]  Right 1 Implanted   SCREW BONE L20MM DIA3MM ANK S STL CANC NONLOCKING LO PROF - [id number]  SCREW BONE L20MM DIA3MM ANK S STL CANC NONLOCKING LO PROF  [employer]  Right 1 Implanted   SCREW BNE L50MM DIA3.5MM JOHNATHAN FT ANK S STL NONLOCKING LO - [id number]  SCREW BNE L50MM DIA3.5MM JOHNATHAN FT ANK S STL NONLOCKING LO  [employer]  Right 1 Implanted   COMPR FT SCRW 2.5 MICRO 50MM - [id number]  COMPR FT SCRW 2.5 MICRO 50MM  [employer]  Right 1 Implanted         Drains:   External Urinary Catheter (Active)   Site Assessment Clean,dry & intact 05/14/25 0858   Placement Replaced 05/14/25 0603   Securement Method Other

## 2025-05-15 NOTE — OP NOTE
was carried down through the skin and subcutaneous tissues.  Fracture the medial malleolus was visualized.  Hematoma was evacuated.  Using pointed reduction clamps, anatomic reduction was achieved, confirmed with IntraOp fluoroscopy.  Being satisfied, we passed our guidewires for our Arthrex headless compression screws.  Being satisfied with the trajectory, we measured, drilled, and placed 2 Arthrex headless compression screws.  Both screws had excellent fixation.  Being satisfied, we turned our attention laterally.  He was determined to place an intramedullary nail.  Small incision was made distal to the tip of the lateral malleolus.  Starting guidewire was placed neoprobe position, confirmed both AP and lateral radiographs.  Guidewire was advanced, followed by opening reamer.  Guidewire was then passed up, we then reamed for our nail.  We then placed our Arthrex fibululock nail.  We confirmed our placement with IntraOp fluoroscopy.  Being satisfied, we deployed the talons proximally, we then used the distal targeting guide, incised the skin, drilled, measured, and placed 3 interlocking screws distally within the nail.  Upon manipulation of the ankle, there is noted to be significant medial clear space widening, indicating a syndesmotic injury.  So we proceeded to place a 3.5 mm Quadra cortical screw using the Arthrex guide.  We drilled, measured, and placed a screw without complication.  Repeat stress examination, yielded a stable syndesmosis.  We then proceeded to thoroughly irrigate the wounds with normal saline.  Gloves were exchanged.  We placed antibiotic powder in the form of vancomycin tobramycin powder.  We closed the deep dermal layers and 2-0 Monocryl, skin using 2-0 nylon in a horizontal mattress fashion.  The wound was then dressed using Xeroform, fluffs, ABD, Webril, and a well-padded posterior short leg splint was then applied.      The patient was then awoken from general anesthesia, and transferred

## 2025-05-15 NOTE — ANESTHESIA PRE PROCEDURE
Department of Anesthesiology  Preprocedure Note       Name:  Ana Kang   Age:  69 y.o.  :  1955                                          MRN:  [id number]         Date:  5/15/2025      Surgeon: Surgeon(s):  Jai Murillo DO    Procedure: Procedure(s):  OPEN REDUCTION INTERNAL FIXATION ANKLE FRACTURE (SUP, 3080 W/EXT, YOANA, BONE FOAM, ARTHREX FIBULOC SET REP NOTIFIED)    Medications prior to admission:   Prior to Admission medications    Medication Sig Start Date End Date Taking? Authorizing Provider   folic acid (FOLVITE) 800 MCG tablet Take 1 tablet by mouth daily   Yes Chacho Edwards MD   potassium chloride (KLOR-CON M) 20 MEQ extended release tablet Take 1 tablet by mouth daily   Yes Chacho Edwards MD   teriflunomide (AUBAGIO) 14 MG tablet Take 1 tablet by mouth daily   Yes Chacho Edwards MD   PARoxetine (PAXIL) 30 MG tablet TAKE 1 TABLET DAILY 25  Yes Liliam Edouard APRN - CNP   primidone (MYSOLINE) 50 MG tablet TAKE 2 TABLETS BY MOUTH IN THE  MORNING AND AT BEDTIME 3/27/25 6/25/25 Yes Janice Andersen PA   KLOR-CON M20 20 MEQ extended release tablet TAKE 1 TABLET DAILY 10/25/24  Yes Liliam Edouard APRN - CNP   lisinopril-hydroCHLOROthiazide (PRINZIDE;ZESTORETIC) 20-25 MG per tablet TAKE 1 TABLET DAILY 10/25/24  Yes Liliam Eoduard APRN - CNP   omeprazole (PRILOSEC) 20 MG delayed release capsule TAKE 1 CAPSULE EVERY       MORNING BEFORE BREAKFAST 24  Yes Liliam Edouard APRN - CNP   amLODIPine (NORVASC) 5 MG tablet TAKE 1 TABLET DAILY 24  Yes Liliam Edouard APRN - CNP   sulfaSALAzine (AZULFIDINE) 500 MG tablet Take 1 tablet by mouth 4 times daily 24  Yes Chacho Edwards MD Misc. Devices MISC Provide rollator walker with seat 24  Yes Liliam Edouard APRN - CNP   dicyclomine (BENTYL) 20 MG tablet Take 1 tablet by mouth 4 times daily as needed (after loose stool) 3/20/23  Yes Liliam Edouard, APRN - CNP

## 2025-05-15 NOTE — CONSULTS
Orthopedic Progress Note    Patient:  Ana Kang  YOB: 1955     69 y.o. female  HPI:  The patient is a 69 y.o. thank you female who presented to the [facility] emergency department yesterday 5/12 after a fall from standing height while trying to enter her bathroom.  Patient denied hitting her head or losing consciousness.  She was unable to get up and ambulate after the fall.  Radiographic images were taken which demonstrated bimalleolar ankle fracture.  Orthopedics consulted at this time.     Upon orthopedic evaluation, patient was resting comfortably in bed.  Patient agreed to above history.  She denies any numbness or tingling to the digits.  She denies any other pain elsewhere at this time. Patient was placed in splint by the ED.     Patient is retired.  Patient denies taking chemical anticoagulation.  Patient ambulates with a walker at baseline.  Patient has a past orthopedic surgical history of left ankle ORIF in 2021, right foot bunionectomy.  Patient has a past medical history of anxiety, Crohn's disease, diverticulosis, hypertension, multiple sclerosis, psoriasis, tremor.    Subjective:  Patient seen and examined this morning. No complaints or concerns. No issues overnight per nursing. Pain is well controlled on current regimen. Denies fever, HA, CP, SOB, N/V, dysuria, new numbness/tingling.  Patient is planning for surgery today with Dr. Murillo for fixation of her right bimalleolar ankle fracture.  Patient is NPO.  Patient's extremity is marked.  Consent is in chart.    Vitals reviewed, afebrile    Objective:   Vitals:    05/15/25 0030   BP: (!) 144/87   Pulse: 80   Resp: 21   Temp: 98 °F (36.7 °C)   SpO2: 91%     Gen: NAD, cooperative    Cardiovascular: Regular rate   Respiratory: No acute respiratory distress, breathing comfortably    Orthopedic Exam  RLE:    Splint on right lower extremity in good repair without pinching at proximal or distal ends. ACE bandage

## 2025-05-15 NOTE — DISCHARGE INSTRUCTIONS
Orthopaedic Instructions:  -Weight bearing status: Non weight bearing with the right leg  -Do not remove dressings or splint until your post-operative follow up date. It is important that you do not get your splint wet. To avoid this and still maintain proper hygiene, you can wrap a garbage/plastic bag (or similar waterproof material) about the splinted arm/leg and secure it with tape while showering. One should still attempt to keep splint out of water with this method. If your splint were to fall off, it is important that you do not attempt to put it back on. Instead, return to the orthopaedic clinic for reapplication (see number below to call).  -Always look for signs of compartment syndrome: pain out of proportion to the injury, pain not controlled with pain medication, numbness in digits, changing of color of digits (paleness). If these signs occur return to ED immediately for reassessment.  -Ice (20 minutes on and off 1 hour) and elevate to reduce swelling and throbbing pain.  -Should urinate within 8 hours of surgery.  -Call the office or come to Emergency Room if signs of infection appear (hot, swollen, red, draining pus, fever)  -Take medications as prescribed.  -Wean off narcotics (percocet/norco) as soon as possible. Do not take tylenol if still taking narcotics.  -Follow up with Dr. Murillo in his office on 6/2 at 9:45am. Call [phone]  to schedule/confirm or with any questions/concerns.

## 2025-05-15 NOTE — H&P
[facility]  Office: [phone]  Roberto Steven DO, Jordan Jimenez DO, Willie Byers DO, Miguelangel Baird DO, Patrick Quinn MD, Yaritza Burger MD, Jocelynn Medel MD, Leanna Cummings MD,  Marco Jones MD, David Patino MD, Sophie Roblero MD,  Zofia Pond DO, Ketty Madison MD, Chito Holley MD, Christos Steven DO, Francisca Miner MD,  Darci Barbour DO, Maryann Cuenca MD, Mitzy Bueno MD, Reina Sebastian MD,  Sacha Lofton MD, Kati Abraham MD, Cecily Stout MD, Jaswant Abel MD, Josh Fine MD, Santos Araya MD, Eliezer Perez DO, Marisol Antunez MD, Tutu Tyler MD, Zofia Martinez MD, Mohsin Reza, MD, Lawanda Yadav MD, Shirley Waterhouse, CNP,  Aletha Saleh, CNP, Eliezer Gutierrez, CNP,  Nayeli Laird, DNP, Verna Carrera, CNP, Savannah Gonzales, CNP, Lilly Crawford, CNP, Yelitza Henley, CNP, Becca Hawley, PA-C, Barby Howard, CNP, Vicki Reese, CNP,  Heather Santos, CNP, Christina Fuchs, CNP, Sourav Turpin, PA-C, Bertha Barrios, CNP,  Susan Byers, CNS, Alina Cordero, CNP, Fiona Servin, CNP,   Gianna Jones, CNP         [facility]   IN-PATIENT SERVICE   [facility]    HISTORY AND PHYSICAL EXAMINATION            Date:   5/15/2025  Patient name:  Ana Kang  Date of admission:  5/15/2025 12:27 AM  MRN:   [id number]  Account:  [id number]  YOB: 1955  PCP:    Liliam Edouard APRN - CNP  Room:   [facility]/NONE  Code Status:    Full Code    Chief Complaint:     No chief complaint on file.      History Obtained From:     patient, spouse, electronic medical record    History of Present Illness:     Ana Kang is a 69 y.o. Non- / non  female with history of Crohn disease, MS, hypertension, overactive bladder and anxiety who presents with No chief complaint on file.   and is admitted to the hospital for the management of Ankle fracture, bimalleolar, closed, right, initial encounter.    Patient

## 2025-05-15 NOTE — DISCHARGE SUMMARY
[facility]  Office: [phone]  Roberto Steven DO, Jordan Jimenez DO, Willie Byers DO, Miguelangel Baird DO, Patrick Quinn MD, Yaritza Burger MD, Jocelynn Medel MD, Leanna Cummings MD,  Marco Jones MD, David Patino MD, Sophie Roblero MD,  Zofia Pond DO, Ketty Madison MD, Chito Holley MD, Christos Steven DO, Francisca Miner MD,  Darci Barbour DO, Maryann Cuenca MD, Mitzy Bueno MD, Reina Sebastian MD,  Sacha Lofton MD, Kati Abraham MD, Cecily Stout MD, Jaswant Abel MD, Josh Fine MD, Santos Araya MD, Eliezer Perez DO, Marisol Antunez MD, Tutu Tyler MD, Zofia Martinez MD, Mohsin Reza, MD, Lawanda Yadav MD, Shirley Waterhouse, CNP,  Aletha Saleh, CNP, Eliezer Gutierrez, CNP,  Nayeli Laird, DNP, Verna Carrera, CNP, Savannah Gonzales, CNP, Lilly Crawford, CNP, Yelitza Henley, CNP, Becca Hawley, PA-C, Barby Howard, CNP, Vicki Reese, CNP,  Heather Santos, CNP, Christina Fuchs, CNP, Sourav Turpin, PA-C, Bertha Barrios, CNP,  Susan Byers, CNS, Alina Cordero, CNP, Fiona Servin, CNP,   Gianna Jones, CNP         [facility]   IN-PATIENT SERVICE   [facility]    Discharge Summary     Patient ID: Ana Kang  :  1955   MRN: [id number]     ACCOUNT:  [id number]   Patient's PCP: Liliam Edouard APRN - CNP  Admit Date: 2025   Discharge Date: 2025  Length of Stay: 2  Code Status:  Full Code  Admitting Physician: Chito Holley MD  Discharge Physician: Chito Holley MD     Active Discharge Diagnoses:     Hospital Problem Lists:  Principal Problem:    Ankle fracture, bimalleolar, closed, right, initial encounter  Active Problems:    Multiple sclerosis (HCC)    Crohn disease (HCC)    Benign essential HTN  Resolved Problems:    * No resolved hospital problems. *      Admission Condition:  fair     Discharged Condition: fair    Hospital Stay:     Hospital Course:  Ana Kang is a 69 y.o. female with a past medical history of

## 2025-05-16 LAB
ANION GAP SERPL CALCULATED.3IONS-SCNC: 9 MMOL/L (ref 9–16)
BASOPHILS # BLD: 0 K/UL (ref 0–0.2)
BASOPHILS NFR BLD: 0 % (ref 0–2)
BUN SERPL-MCNC: 14 MG/DL (ref 8–23)
CALCIUM SERPL-MCNC: 7.9 MG/DL (ref 8.6–10.4)
CHLORIDE SERPL-SCNC: 107 MMOL/L (ref 98–107)
CO2 SERPL-SCNC: 22 MMOL/L (ref 20–31)
CREAT SERPL-MCNC: 0.9 MG/DL (ref 0.6–0.9)
EOSINOPHIL # BLD: 0.09 K/UL (ref 0–0.44)
EOSINOPHILS RELATIVE PERCENT: 1 % (ref 1–4)
ERYTHROCYTE [DISTWIDTH] IN BLOOD BY AUTOMATED COUNT: 14.3 % (ref 11.8–14.4)
GFR, ESTIMATED: 70 ML/MIN/1.73M2
GLUCOSE SERPL-MCNC: 129 MG/DL (ref 74–99)
HCT VFR BLD AUTO: 36.6 % (ref 36.3–47.1)
HGB BLD-MCNC: 10.9 G/DL (ref 11.9–15.1)
IMM GRANULOCYTES # BLD AUTO: 0.09 K/UL (ref 0–0.3)
IMM GRANULOCYTES NFR BLD: 1 %
INR PPP: 1.1
LYMPHOCYTES NFR BLD: 0.93 K/UL (ref 1.1–3.7)
LYMPHOCYTES RELATIVE PERCENT: 10 % (ref 24–43)
MAGNESIUM SERPL-MCNC: 2 MG/DL (ref 1.6–2.4)
MCH RBC QN AUTO: 31.4 PG (ref 25.2–33.5)
MCHC RBC AUTO-ENTMCNC: 29.8 G/DL (ref 28.4–34.8)
MCV RBC AUTO: 105.5 FL (ref 82.6–102.9)
MONOCYTES NFR BLD: 1.58 K/UL (ref 0.1–1.2)
MONOCYTES NFR BLD: 17 % (ref 3–12)
MORPHOLOGY: NORMAL
NEUTROPHILS NFR BLD: 71 % (ref 36–65)
NEUTS SEG NFR BLD: 6.61 K/UL (ref 1.5–8.1)
NRBC BLD-RTO: 0 PER 100 WBC
PLATELET # BLD AUTO: 230 K/UL (ref 138–453)
PMV BLD AUTO: 9.9 FL (ref 8.1–13.5)
POTASSIUM SERPL-SCNC: 3.5 MMOL/L (ref 3.7–5.3)
PROTHROMBIN TIME: 14.6 SEC (ref 11.7–14.9)
RBC # BLD AUTO: 3.47 M/UL (ref 3.95–5.11)
SODIUM SERPL-SCNC: 138 MMOL/L (ref 136–145)
WBC OTHER # BLD: 9.3 K/UL (ref 3.5–11.3)

## 2025-05-16 PROCEDURE — 80048 BASIC METABOLIC PNL TOTAL CA: CPT

## 2025-05-16 PROCEDURE — 97166 OT EVAL MOD COMPLEX 45 MIN: CPT

## 2025-05-16 PROCEDURE — 99232 SBSQ HOSP IP/OBS MODERATE 35: CPT | Performed by: STUDENT IN AN ORGANIZED HEALTH CARE EDUCATION/TRAINING PROGRAM

## 2025-05-16 PROCEDURE — 85025 COMPLETE CBC W/AUTO DIFF WBC: CPT

## 2025-05-16 PROCEDURE — 6370000000 HC RX 637 (ALT 250 FOR IP)

## 2025-05-16 PROCEDURE — 2580000003 HC RX 258

## 2025-05-16 PROCEDURE — 6370000000 HC RX 637 (ALT 250 FOR IP): Performed by: STUDENT IN AN ORGANIZED HEALTH CARE EDUCATION/TRAINING PROGRAM

## 2025-05-16 PROCEDURE — 97162 PT EVAL MOD COMPLEX 30 MIN: CPT

## 2025-05-16 PROCEDURE — 2500000003 HC RX 250 WO HCPCS

## 2025-05-16 PROCEDURE — 83735 ASSAY OF MAGNESIUM: CPT

## 2025-05-16 PROCEDURE — 97530 THERAPEUTIC ACTIVITIES: CPT

## 2025-05-16 PROCEDURE — 97535 SELF CARE MNGMENT TRAINING: CPT

## 2025-05-16 PROCEDURE — 85610 PROTHROMBIN TIME: CPT

## 2025-05-16 PROCEDURE — 6360000002 HC RX W HCPCS

## 2025-05-16 PROCEDURE — 1200000000 HC SEMI PRIVATE

## 2025-05-16 PROCEDURE — 36415 COLL VENOUS BLD VENIPUNCTURE: CPT

## 2025-05-16 RX ORDER — TERIFLUNOMIDE 14 MG/1
14 TABLET, FILM COATED ORAL DAILY
Status: DISCONTINUED | OUTPATIENT
Start: 2025-05-16 | End: 2025-05-21 | Stop reason: HOSPADM

## 2025-05-16 RX ORDER — POTASSIUM CHLORIDE 1500 MG/1
40 TABLET, EXTENDED RELEASE ORAL ONCE
Status: COMPLETED | OUTPATIENT
Start: 2025-05-16 | End: 2025-05-16

## 2025-05-16 RX ADMIN — PRIMIDONE 100 MG: 50 TABLET ORAL at 08:44

## 2025-05-16 RX ADMIN — TROSPIUM CHLORIDE 20 MG: 20 TABLET, FILM COATED ORAL at 05:44

## 2025-05-16 RX ADMIN — SODIUM CHLORIDE, PRESERVATIVE FREE 10 ML: 5 INJECTION INTRAVENOUS at 08:46

## 2025-05-16 RX ADMIN — SULFASALAZINE 500 MG: 500 TABLET ORAL at 08:44

## 2025-05-16 RX ADMIN — PRIMIDONE 100 MG: 50 TABLET ORAL at 20:07

## 2025-05-16 RX ADMIN — OXYCODONE 5 MG: 5 TABLET ORAL at 16:16

## 2025-05-16 RX ADMIN — Medication 4000 UNITS: at 08:44

## 2025-05-16 RX ADMIN — SULFASALAZINE 500 MG: 500 TABLET ORAL at 16:16

## 2025-05-16 RX ADMIN — HYDROCHLOROTHIAZIDE 25 MG: 25 TABLET ORAL at 08:44

## 2025-05-16 RX ADMIN — OXYCODONE 5 MG: 5 TABLET ORAL at 20:06

## 2025-05-16 RX ADMIN — GABAPENTIN 600 MG: 300 CAPSULE ORAL at 08:43

## 2025-05-16 RX ADMIN — PROPRANOLOL HYDROCHLORIDE 60 MG: 60 CAPSULE, EXTENDED RELEASE ORAL at 08:44

## 2025-05-16 RX ADMIN — POTASSIUM CHLORIDE 40 MEQ: 1500 TABLET, EXTENDED RELEASE ORAL at 08:43

## 2025-05-16 RX ADMIN — Medication 2000 MG: at 05:44

## 2025-05-16 RX ADMIN — LISINOPRIL 20 MG: 20 TABLET ORAL at 08:43

## 2025-05-16 RX ADMIN — OXYCODONE 5 MG: 5 TABLET ORAL at 05:44

## 2025-05-16 RX ADMIN — OXYCODONE 5 MG: 5 TABLET ORAL at 11:17

## 2025-05-16 RX ADMIN — PAROXETINE 30 MG: 10 TABLET, FILM COATED ORAL at 08:45

## 2025-05-16 RX ADMIN — SULFASALAZINE 500 MG: 500 TABLET ORAL at 12:20

## 2025-05-16 RX ADMIN — SODIUM CHLORIDE, PRESERVATIVE FREE 10 ML: 5 INJECTION INTRAVENOUS at 20:10

## 2025-05-16 RX ADMIN — AMLODIPINE BESYLATE 5 MG: 5 TABLET ORAL at 08:44

## 2025-05-16 RX ADMIN — SULFASALAZINE 500 MG: 500 TABLET ORAL at 20:07

## 2025-05-16 RX ADMIN — GABAPENTIN 600 MG: 300 CAPSULE ORAL at 13:53

## 2025-05-16 RX ADMIN — TROSPIUM CHLORIDE 20 MG: 20 TABLET, FILM COATED ORAL at 16:16

## 2025-05-16 RX ADMIN — SODIUM CHLORIDE: 0.9 INJECTION, SOLUTION INTRAVENOUS at 02:19

## 2025-05-16 RX ADMIN — GABAPENTIN 600 MG: 300 CAPSULE ORAL at 20:07

## 2025-05-16 RX ADMIN — TERIFLUNOMIDE 14 MG: 14 TABLET, FILM COATED ORAL at 12:20

## 2025-05-16 ASSESSMENT — PAIN DESCRIPTION - DESCRIPTORS
DESCRIPTORS: ACHING

## 2025-05-16 ASSESSMENT — PAIN DESCRIPTION - LOCATION
LOCATION: ANKLE

## 2025-05-16 ASSESSMENT — PAIN DESCRIPTION - ORIENTATION
ORIENTATION: RIGHT

## 2025-05-16 ASSESSMENT — PAIN SCALES - GENERAL
PAINLEVEL_OUTOF10: 7
PAINLEVEL_OUTOF10: 8
PAINLEVEL_OUTOF10: 7
PAINLEVEL_OUTOF10: 6
PAINLEVEL_OUTOF10: 7
PAINLEVEL_OUTOF10: 4
PAINLEVEL_OUTOF10: 4
PAINLEVEL_OUTOF10: 7
PAINLEVEL_OUTOF10: 4

## 2025-05-16 NOTE — CARE COORDINATION
Case Management   Daily Progress Note       Patient Name: Ana Kang                   YOB: 1955  Diagnosis: Closed right ankle fracture [S82.891A]  Closed right ankle fracture, initial encounter [S82.891A]                       GMLOS: 4.2 days  Length of Stay: 1  days    Anticipated Discharge Date: Two or more days before discharge    Readmission Risk (Low < 19, Mod (19-27), High > 27): Readmission Risk Score: 15.9        Current Transitional Plan    [] Home Independently    [] Home with HC    [x] Skilled Nursing Facility    [] Acute Rehabilitation    [] Long Term Acute Care (LTAC)    [] Other:     Plan for the Stay (Medical Management) :    Need SNF choice and acceptance.         Workflow Continuation (Additional Notes) :    Pt  called and spoke w RN requesting SNF list. CM spoke w pt , role explained.                   Post Acute Facility/Agency List     Provided patient with the following list, the list includes the overall star ratings obtained from CMS per the Medicare Web site (www.Medicare.gov):     [] Long Term Acute Care Facilities  [] Acute Inpatient Rehabilitation Facilities  [x] Skilled Nursing Facilities  [] Hospice Facilities  [] Home Care    Provided verbal instructions on how to utilize the QR Code to obtain additional detailed star ratings from www.Medicare.gov     offered to print and provide the detailed list:    [x]Accepted   []Declined   Pt states she wants referral to [place] and will discuss additional SNF's w  and give add choices . Referral sent       AMERICA BANERJEE  May 16, 2025

## 2025-05-16 NOTE — CARE COORDINATION
SBIRT complete, screening was negative.  Pt states she lives with her  and son who are both good support systems for her.  She denies alcohol/drug use and also denies depression/SI.        Alcohol Screening and Brief Intervention        No results for input(s): \"ALC\" in the last 72 hours.    Alcohol Pre-screening     (WOMEN ONLY) How many times in the past year have you had 4 or more drinks in a day?: None       Drug Pre-Screening -none       Drug Screening DAST       Mood Pre-Screening (PHQ-2)  During the past 2 weeks, have you been bothered by, feeling down, depressed or hopeless?  No        I have interviewed Ana Kang, [id number] regarding  Her alcohol consumption/drug use and risk for excessive use. Screenings were negative.  Patient  N/A intervention at this time.     Deferred []    Completed on: 5/16/2025   TIARA Bosch

## 2025-05-17 LAB
ANION GAP SERPL CALCULATED.3IONS-SCNC: 10 MMOL/L (ref 9–16)
BUN SERPL-MCNC: 14 MG/DL (ref 8–23)
CALCIUM SERPL-MCNC: 8.6 MG/DL (ref 8.6–10.4)
CHLORIDE SERPL-SCNC: 103 MMOL/L (ref 98–107)
CO2 SERPL-SCNC: 25 MMOL/L (ref 20–31)
CREAT SERPL-MCNC: 0.8 MG/DL (ref 0.6–0.9)
GFR, ESTIMATED: 81 ML/MIN/1.73M2
GLUCOSE SERPL-MCNC: 130 MG/DL (ref 74–99)
POTASSIUM SERPL-SCNC: 3.9 MMOL/L (ref 3.7–5.3)
SODIUM SERPL-SCNC: 138 MMOL/L (ref 136–145)

## 2025-05-17 PROCEDURE — 6370000000 HC RX 637 (ALT 250 FOR IP)

## 2025-05-17 PROCEDURE — 94761 N-INVAS EAR/PLS OXIMETRY MLT: CPT

## 2025-05-17 PROCEDURE — 6370000000 HC RX 637 (ALT 250 FOR IP): Performed by: STUDENT IN AN ORGANIZED HEALTH CARE EDUCATION/TRAINING PROGRAM

## 2025-05-17 PROCEDURE — 97530 THERAPEUTIC ACTIVITIES: CPT

## 2025-05-17 PROCEDURE — 2500000003 HC RX 250 WO HCPCS

## 2025-05-17 PROCEDURE — 99232 SBSQ HOSP IP/OBS MODERATE 35: CPT | Performed by: STUDENT IN AN ORGANIZED HEALTH CARE EDUCATION/TRAINING PROGRAM

## 2025-05-17 PROCEDURE — 36415 COLL VENOUS BLD VENIPUNCTURE: CPT

## 2025-05-17 PROCEDURE — 97110 THERAPEUTIC EXERCISES: CPT

## 2025-05-17 PROCEDURE — 80048 BASIC METABOLIC PNL TOTAL CA: CPT

## 2025-05-17 PROCEDURE — 1200000000 HC SEMI PRIVATE

## 2025-05-17 PROCEDURE — 97535 SELF CARE MNGMENT TRAINING: CPT

## 2025-05-17 RX ADMIN — TERIFLUNOMIDE 14 MG: 14 TABLET, FILM COATED ORAL at 08:48

## 2025-05-17 RX ADMIN — PAROXETINE 30 MG: 10 TABLET, FILM COATED ORAL at 08:48

## 2025-05-17 RX ADMIN — GABAPENTIN 600 MG: 300 CAPSULE ORAL at 08:47

## 2025-05-17 RX ADMIN — GABAPENTIN 600 MG: 300 CAPSULE ORAL at 20:44

## 2025-05-17 RX ADMIN — SULFASALAZINE 500 MG: 500 TABLET ORAL at 08:47

## 2025-05-17 RX ADMIN — OXYCODONE 5 MG: 5 TABLET ORAL at 11:06

## 2025-05-17 RX ADMIN — GABAPENTIN 600 MG: 300 CAPSULE ORAL at 13:37

## 2025-05-17 RX ADMIN — SULFASALAZINE 500 MG: 500 TABLET ORAL at 12:16

## 2025-05-17 RX ADMIN — TROSPIUM CHLORIDE 20 MG: 20 TABLET, FILM COATED ORAL at 06:40

## 2025-05-17 RX ADMIN — SODIUM CHLORIDE, PRESERVATIVE FREE 10 ML: 5 INJECTION INTRAVENOUS at 08:47

## 2025-05-17 RX ADMIN — OXYCODONE 5 MG: 5 TABLET ORAL at 15:44

## 2025-05-17 RX ADMIN — PROPRANOLOL HYDROCHLORIDE 60 MG: 60 CAPSULE, EXTENDED RELEASE ORAL at 08:48

## 2025-05-17 RX ADMIN — Medication 4000 UNITS: at 08:47

## 2025-05-17 RX ADMIN — TROSPIUM CHLORIDE 20 MG: 20 TABLET, FILM COATED ORAL at 15:44

## 2025-05-17 RX ADMIN — OXYCODONE 5 MG: 5 TABLET ORAL at 20:45

## 2025-05-17 RX ADMIN — SULFASALAZINE 500 MG: 500 TABLET ORAL at 16:37

## 2025-05-17 RX ADMIN — OXYCODONE 5 MG: 5 TABLET ORAL at 06:43

## 2025-05-17 RX ADMIN — SODIUM CHLORIDE, PRESERVATIVE FREE 10 ML: 5 INJECTION INTRAVENOUS at 20:45

## 2025-05-17 RX ADMIN — OXYCODONE 5 MG: 5 TABLET ORAL at 02:22

## 2025-05-17 RX ADMIN — PRIMIDONE 100 MG: 50 TABLET ORAL at 08:47

## 2025-05-17 RX ADMIN — SULFASALAZINE 500 MG: 500 TABLET ORAL at 20:45

## 2025-05-17 RX ADMIN — AMLODIPINE BESYLATE 5 MG: 5 TABLET ORAL at 08:46

## 2025-05-17 RX ADMIN — PRIMIDONE 100 MG: 50 TABLET ORAL at 20:45

## 2025-05-17 ASSESSMENT — PAIN SCALES - GENERAL
PAINLEVEL_OUTOF10: 4
PAINLEVEL_OUTOF10: 7
PAINLEVEL_OUTOF10: 7
PAINLEVEL_OUTOF10: 4
PAINLEVEL_OUTOF10: 7
PAINLEVEL_OUTOF10: 4
PAINLEVEL_OUTOF10: 2
PAINLEVEL_OUTOF10: 7
PAINLEVEL_OUTOF10: 7

## 2025-05-17 ASSESSMENT — PAIN DESCRIPTION - LOCATION
LOCATION: ANKLE
LOCATION: LEG
LOCATION: ANKLE

## 2025-05-17 ASSESSMENT — PAIN DESCRIPTION - DESCRIPTORS
DESCRIPTORS: ACHING

## 2025-05-17 ASSESSMENT — PAIN DESCRIPTION - ORIENTATION
ORIENTATION: RIGHT

## 2025-05-17 NOTE — CARE COORDINATION
Case Management Assessment  Initial Evaluation    Date/Time of Evaluation: 5/17/2025 9:33 AM  Assessment Completed by: SONIA ROSARIO RN    If patient is discharged prior to next notation, then this note serves as note for discharge by case management.    Patient Name: Ana Kang                   YOB: 1955  Diagnosis: Closed right ankle fracture [S82.891A]  Closed right ankle fracture, initial encounter [S82.891A]                   Date / Time: 5/15/2025 12:27 AM    Patient Admission Status: Inpatient   Readmission Risk (Low < 19, Mod (19-27), High > 27): Readmission Risk Score: 15.6    Current PCP: Liliam Edouard APRN - CNP  PCP verified by CM? Yes    Chart Reviewed: Yes      History Provided by: Patient  Patient Orientation: Alert and Oriented    Patient Cognition: Alert    Hospitalization in the last 30 days (Readmission):  Yes    If yes, Readmission Assessment in CM Navigator will be completed.    Advance Directives:      Code Status: Full Code   Patient's Primary Decision Maker is: Legal Next of Kin    Primary Decision Maker: Sid Kang - Spouse - [phone]    Discharge Planning:    Patient lives with: Spouse/Significant Other, Children Type of Home: Skilled Nursing Facility  Primary Care Giver: Self  Patient Support Systems include: Spouse/Significant Other, Children   Current Financial resources: Medicare  Current community resources: None  Current services prior to admission: Durable Medical Equipment            Current DME: Walker, Wheelchair            Type of Home Care services:  (P) None    ADLS  Prior functional level: Other (see comment) (assist with all ADLs)  Current functional level: Other (see comment) (assist with all ADLs)    PT AM-PAC: 11 /24  OT AM-PAC: 15 /24    Family can provide assistance at DC: No  Would you like Case Management to discuss the discharge plan with any other family members/significant others, and if so, who? Yes (Sid)  Plans to

## 2025-05-17 NOTE — PLAN OF CARE
Problem: Discharge Planning  Goal: Discharge to home or other facility with appropriate resources  5/17/2025 0400 by Malini Miller RN  Outcome: Progressing  5/16/2025 1803 by Janneth Watson RN  Outcome: Progressing     Problem: Skin/Tissue Integrity  Goal: Skin integrity remains intact  Description: 1.  Monitor for areas of redness and/or skin breakdown2.  Assess vascular access sites hourly3.  Every 4-6 hours minimum:  Change oxygen saturation probe site4.  Every 4-6 hours:  If on nasal continuous positive airway pressure, respiratory therapy assess nares and determine need for appliance change or resting period  Outcome: Progressing     Problem: Safety - Adult  Goal: Free from fall injury  5/17/2025 0400 by Malini Miller RN  Outcome: Progressing  5/16/2025 1803 by Janneth Watson RN  Outcome: Progressing     Problem: ABCDS Injury Assessment  Goal: Absence of physical injury  5/17/2025 0400 by Malini Miller RN  Outcome: Progressing  5/16/2025 1803 by Janneth Watson RN  Outcome: Progressing     Problem: Pain  Goal: Verbalizes/displays adequate comfort level or baseline comfort level  Outcome: Progressing

## 2025-05-17 NOTE — PLAN OF CARE
Problem: Discharge Planning  Goal: Discharge to home or other facility with appropriate resources  5/17/2025 1746 by Janneth Watson RN  Outcome: Progressing     Problem: Safety - Adult  Goal: Free from fall injury  5/17/2025 1746 by Janneth Watson RN  Outcome: Progressing     Problem: ABCDS Injury Assessment  Goal: Absence of physical injury  5/17/2025 1746 by Janneth Watson RN  Outcome: Progressing

## 2025-05-18 PROCEDURE — 1200000000 HC SEMI PRIVATE

## 2025-05-18 PROCEDURE — 6370000000 HC RX 637 (ALT 250 FOR IP)

## 2025-05-18 PROCEDURE — 99231 SBSQ HOSP IP/OBS SF/LOW 25: CPT | Performed by: STUDENT IN AN ORGANIZED HEALTH CARE EDUCATION/TRAINING PROGRAM

## 2025-05-18 PROCEDURE — 2500000003 HC RX 250 WO HCPCS

## 2025-05-18 PROCEDURE — 6370000000 HC RX 637 (ALT 250 FOR IP): Performed by: STUDENT IN AN ORGANIZED HEALTH CARE EDUCATION/TRAINING PROGRAM

## 2025-05-18 PROCEDURE — 94761 N-INVAS EAR/PLS OXIMETRY MLT: CPT

## 2025-05-18 RX ADMIN — GABAPENTIN 600 MG: 300 CAPSULE ORAL at 20:56

## 2025-05-18 RX ADMIN — OXYCODONE 5 MG: 5 TABLET ORAL at 18:59

## 2025-05-18 RX ADMIN — GABAPENTIN 600 MG: 300 CAPSULE ORAL at 13:51

## 2025-05-18 RX ADMIN — PAROXETINE 30 MG: 10 TABLET, FILM COATED ORAL at 08:24

## 2025-05-18 RX ADMIN — SULFASALAZINE 500 MG: 500 TABLET ORAL at 20:56

## 2025-05-18 RX ADMIN — GABAPENTIN 600 MG: 300 CAPSULE ORAL at 08:22

## 2025-05-18 RX ADMIN — OXYCODONE 5 MG: 5 TABLET ORAL at 05:52

## 2025-05-18 RX ADMIN — TROSPIUM CHLORIDE 20 MG: 20 TABLET, FILM COATED ORAL at 16:36

## 2025-05-18 RX ADMIN — TERIFLUNOMIDE 14 MG: 14 TABLET, FILM COATED ORAL at 08:24

## 2025-05-18 RX ADMIN — PRIMIDONE 100 MG: 50 TABLET ORAL at 20:56

## 2025-05-18 RX ADMIN — SODIUM CHLORIDE, PRESERVATIVE FREE 10 ML: 5 INJECTION INTRAVENOUS at 08:25

## 2025-05-18 RX ADMIN — SULFASALAZINE 500 MG: 500 TABLET ORAL at 12:22

## 2025-05-18 RX ADMIN — Medication 4000 UNITS: at 08:22

## 2025-05-18 RX ADMIN — SULFASALAZINE 500 MG: 500 TABLET ORAL at 08:23

## 2025-05-18 RX ADMIN — TROSPIUM CHLORIDE 20 MG: 20 TABLET, FILM COATED ORAL at 05:52

## 2025-05-18 RX ADMIN — AMLODIPINE BESYLATE 5 MG: 5 TABLET ORAL at 08:22

## 2025-05-18 RX ADMIN — OXYCODONE 5 MG: 5 TABLET ORAL at 14:48

## 2025-05-18 RX ADMIN — PROPRANOLOL HYDROCHLORIDE 60 MG: 60 CAPSULE, EXTENDED RELEASE ORAL at 08:23

## 2025-05-18 RX ADMIN — PRIMIDONE 100 MG: 50 TABLET ORAL at 08:23

## 2025-05-18 RX ADMIN — OXYCODONE 5 MG: 5 TABLET ORAL at 10:47

## 2025-05-18 RX ADMIN — SULFASALAZINE 500 MG: 500 TABLET ORAL at 16:36

## 2025-05-18 RX ADMIN — SODIUM CHLORIDE, PRESERVATIVE FREE 10 ML: 5 INJECTION INTRAVENOUS at 20:57

## 2025-05-18 ASSESSMENT — PAIN DESCRIPTION - DESCRIPTORS
DESCRIPTORS: ACHING

## 2025-05-18 ASSESSMENT — PAIN SCALES - GENERAL
PAINLEVEL_OUTOF10: 7
PAINLEVEL_OUTOF10: 3
PAINLEVEL_OUTOF10: 4
PAINLEVEL_OUTOF10: 3
PAINLEVEL_OUTOF10: 4
PAINLEVEL_OUTOF10: 7
PAINLEVEL_OUTOF10: 4
PAINLEVEL_OUTOF10: 7
PAINLEVEL_OUTOF10: 7

## 2025-05-18 ASSESSMENT — PAIN DESCRIPTION - LOCATION
LOCATION: ANKLE

## 2025-05-18 ASSESSMENT — PAIN DESCRIPTION - ORIENTATION
ORIENTATION: RIGHT

## 2025-05-18 NOTE — PLAN OF CARE
Problem: Discharge Planning  Goal: Discharge to home or other facility with appropriate resources  5/18/2025 0503 by Malini Miller RN  Outcome: Progressing  5/17/2025 1746 by Janneth Watson RN  Outcome: Progressing     Problem: Skin/Tissue Integrity  Goal: Skin integrity remains intact  Description: 1.  Monitor for areas of redness and/or skin breakdown2.  Assess vascular access sites hourly3.  Every 4-6 hours minimum:  Change oxygen saturation probe site4.  Every 4-6 hours:  If on nasal continuous positive airway pressure, respiratory therapy assess nares and determine need for appliance change or resting period  Outcome: Progressing     Problem: Safety - Adult  Goal: Free from fall injury  5/18/2025 0503 by Malini Miller RN  Outcome: Progressing  5/17/2025 1746 by Janneth Watson RN  Outcome: Progressing     Problem: ABCDS Injury Assessment  Goal: Absence of physical injury  5/18/2025 0503 by Malini Miller RN  Outcome: Progressing  5/17/2025 1746 by Janneth Watson RN  Outcome: Progressing     Problem: Pain  Goal: Verbalizes/displays adequate comfort level or baseline comfort level  Outcome: Progressing

## 2025-05-18 NOTE — PLAN OF CARE
Problem: Discharge Planning  Goal: Discharge to home or other facility with appropriate resources  5/18/2025 1743 by Janneth Watson RN  Outcome: Progressing     Problem: Safety - Adult  Goal: Free from fall injury  5/18/2025 1743 by Janneth Watson RN  Outcome: Progressing     Problem: ABCDS Injury Assessment  Goal: Absence of physical injury  5/18/2025 1743 by Janneth Watson RN  Outcome: Progressing

## 2025-05-19 PROCEDURE — 97530 THERAPEUTIC ACTIVITIES: CPT

## 2025-05-19 PROCEDURE — 6370000000 HC RX 637 (ALT 250 FOR IP): Performed by: STUDENT IN AN ORGANIZED HEALTH CARE EDUCATION/TRAINING PROGRAM

## 2025-05-19 PROCEDURE — 1200000000 HC SEMI PRIVATE

## 2025-05-19 PROCEDURE — 97535 SELF CARE MNGMENT TRAINING: CPT

## 2025-05-19 PROCEDURE — 2500000003 HC RX 250 WO HCPCS

## 2025-05-19 PROCEDURE — 99232 SBSQ HOSP IP/OBS MODERATE 35: CPT | Performed by: STUDENT IN AN ORGANIZED HEALTH CARE EDUCATION/TRAINING PROGRAM

## 2025-05-19 PROCEDURE — 6370000000 HC RX 637 (ALT 250 FOR IP)

## 2025-05-19 PROCEDURE — 97112 NEUROMUSCULAR REEDUCATION: CPT

## 2025-05-19 RX ORDER — OXYCODONE HYDROCHLORIDE 5 MG/1
5 TABLET ORAL EVERY 4 HOURS PRN
Qty: 12 TABLET | Refills: 0 | Status: SHIPPED | OUTPATIENT
Start: 2025-05-19 | End: 2025-05-20 | Stop reason: HOSPADM

## 2025-05-19 RX ADMIN — AMLODIPINE BESYLATE 5 MG: 5 TABLET ORAL at 08:32

## 2025-05-19 RX ADMIN — TROSPIUM CHLORIDE 20 MG: 20 TABLET, FILM COATED ORAL at 07:09

## 2025-05-19 RX ADMIN — GABAPENTIN 600 MG: 300 CAPSULE ORAL at 13:59

## 2025-05-19 RX ADMIN — Medication 4000 UNITS: at 08:31

## 2025-05-19 RX ADMIN — PROPRANOLOL HYDROCHLORIDE 60 MG: 60 CAPSULE, EXTENDED RELEASE ORAL at 08:33

## 2025-05-19 RX ADMIN — GABAPENTIN 600 MG: 300 CAPSULE ORAL at 20:16

## 2025-05-19 RX ADMIN — OXYCODONE 5 MG: 5 TABLET ORAL at 12:35

## 2025-05-19 RX ADMIN — TROSPIUM CHLORIDE 20 MG: 20 TABLET, FILM COATED ORAL at 16:20

## 2025-05-19 RX ADMIN — PRIMIDONE 100 MG: 50 TABLET ORAL at 08:33

## 2025-05-19 RX ADMIN — SULFASALAZINE 500 MG: 500 TABLET ORAL at 13:59

## 2025-05-19 RX ADMIN — SULFASALAZINE 500 MG: 500 TABLET ORAL at 16:42

## 2025-05-19 RX ADMIN — PAROXETINE 30 MG: 10 TABLET, FILM COATED ORAL at 08:32

## 2025-05-19 RX ADMIN — SODIUM CHLORIDE, PRESERVATIVE FREE 10 ML: 5 INJECTION INTRAVENOUS at 20:17

## 2025-05-19 RX ADMIN — OXYCODONE 5 MG: 5 TABLET ORAL at 16:20

## 2025-05-19 RX ADMIN — SULFASALAZINE 500 MG: 500 TABLET ORAL at 08:32

## 2025-05-19 RX ADMIN — SULFASALAZINE 500 MG: 500 TABLET ORAL at 20:16

## 2025-05-19 RX ADMIN — PRIMIDONE 100 MG: 50 TABLET ORAL at 20:16

## 2025-05-19 RX ADMIN — OXYCODONE 5 MG: 5 TABLET ORAL at 07:09

## 2025-05-19 RX ADMIN — GABAPENTIN 600 MG: 300 CAPSULE ORAL at 08:32

## 2025-05-19 RX ADMIN — OXYCODONE 5 MG: 5 TABLET ORAL at 20:16

## 2025-05-19 RX ADMIN — TERIFLUNOMIDE 14 MG: 14 TABLET, FILM COATED ORAL at 08:33

## 2025-05-19 RX ADMIN — SODIUM CHLORIDE, PRESERVATIVE FREE 10 ML: 5 INJECTION INTRAVENOUS at 08:34

## 2025-05-19 ASSESSMENT — PAIN DESCRIPTION - ORIENTATION
ORIENTATION: RIGHT

## 2025-05-19 ASSESSMENT — PAIN SCALES - GENERAL
PAINLEVEL_OUTOF10: 7
PAINLEVEL_OUTOF10: 8
PAINLEVEL_OUTOF10: 4
PAINLEVEL_OUTOF10: 5
PAINLEVEL_OUTOF10: 5
PAINLEVEL_OUTOF10: 7
PAINLEVEL_OUTOF10: 8

## 2025-05-19 ASSESSMENT — ENCOUNTER SYMPTOMS
COLOR CHANGE: 0
BACK PAIN: 0
CONSTIPATION: 0
NAUSEA: 0
DIARRHEA: 0
BLOOD IN STOOL: 0
COUGH: 0
SHORTNESS OF BREATH: 0
VOMITING: 0
SORE THROAT: 0
ABDOMINAL PAIN: 0
WHEEZING: 0
TROUBLE SWALLOWING: 0

## 2025-05-19 ASSESSMENT — PAIN DESCRIPTION - LOCATION
LOCATION: LEG
LOCATION: LEG
LOCATION: ANKLE
LOCATION: LEG;ANKLE
LOCATION: ANKLE

## 2025-05-19 ASSESSMENT — PAIN DESCRIPTION - DESCRIPTORS
DESCRIPTORS: STABBING;SHARP
DESCRIPTORS: DISCOMFORT
DESCRIPTORS: SHARP

## 2025-05-19 NOTE — PLAN OF CARE
Problem: Discharge Planning  Goal: Discharge to home or other facility with appropriate resources  5/19/2025 0404 by Malini Miller RN  Outcome: Progressing  5/18/2025 1743 by Janneth Watson RN  Outcome: Progressing     Problem: Skin/Tissue Integrity  Goal: Skin integrity remains intact  Description: 1.  Monitor for areas of redness and/or skin breakdown2.  Assess vascular access sites hourly3.  Every 4-6 hours minimum:  Change oxygen saturation probe site4.  Every 4-6 hours:  If on nasal continuous positive airway pressure, respiratory therapy assess nares and determine need for appliance change or resting period  Outcome: Progressing     Problem: Safety - Adult  Goal: Free from fall injury  5/19/2025 0404 by Malini Miller RN  Outcome: Progressing  5/18/2025 1743 by Janneth Watson RN  Outcome: Progressing     Problem: ABCDS Injury Assessment  Goal: Absence of physical injury  5/19/2025 0404 by Malini Miller RN  Outcome: Progressing  5/18/2025 1743 by Janneth Watson RN  Outcome: Progressing     Problem: Pain  Goal: Verbalizes/displays adequate comfort level or baseline comfort level  Outcome: Progressing

## 2025-05-19 NOTE — CARE COORDINATION
Case Management   Daily Progress Note       Patient Name: Ana Kang                   YOB: 1955  Diagnosis: Closed right ankle fracture [S82.891A]  Closed right ankle fracture, initial encounter [S82.891A]                       GMLOS: 4.2 days  Length of Stay: 4  days    Anticipated Discharge Date: One day until discharge    Readmission Risk (Low < 19, Mod (19-27), High > 27): Readmission Risk Score: 13.4        Current Transitional Plan    [] Home Independently    [] Home with HC    [x] Skilled Nursing Facility    [] Acute Rehabilitation    [] Long Term Acute Care (LTAC)    [] Other:     Plan for the Stay (Medical Management) :          Workflow Continuation (Additional Notes) : Received VM from Jessica at [facility], she states they are unable to accept due to high cost of medication ( they are unable to allow patient to supply it herself).    0954 Called and spoke with Lee at [facility], he states he has not reviewed referral, but plans to this morning.    1011 Called and left VM for Pura in admissions at [facility] re: referral, requested return call.  1100 Received call from Lee at [facility], states they are unable to accept patient, no reason given.    Zarina Worthington RN  May 19, 2025

## 2025-05-19 NOTE — PLAN OF CARE
Problem: Discharge Planning  Goal: Discharge to home or other facility with appropriate resources  5/19/2025 1652 by Juanita Mead RN  Outcome: Progressing  5/19/2025 0404 by Malini Miller RN  Outcome: Progressing     Problem: Skin/Tissue Integrity  Goal: Skin integrity remains intact  Description: 1.  Monitor for areas of redness and/or skin breakdown2.  Assess vascular access sites hourly3.  Every 4-6 hours minimum:  Change oxygen saturation probe site4.  Every 4-6 hours:  If on nasal continuous positive airway pressure, respiratory therapy assess nares and determine need for appliance change or resting period  5/19/2025 1652 by Juanita Mead RN  Outcome: Progressing  5/19/2025 0404 by Malini Miller RN  Outcome: Progressing     Problem: Safety - Adult  Goal: Free from fall injury  5/19/2025 1652 by Juanita Mead RN  Outcome: Progressing  5/19/2025 0404 by Malini Miller RN  Outcome: Progressing     Problem: ABCDS Injury Assessment  Goal: Absence of physical injury  5/19/2025 1652 by Juanita Mead RN  Outcome: Progressing  5/19/2025 0404 by Malini Miller RN  Outcome: Progressing     Problem: Pain  Goal: Verbalizes/displays adequate comfort level or baseline comfort level  5/19/2025 1652 by Juanita Mead RN  Outcome: Progressing  5/19/2025 0404 by Malini Miller RN  Outcome: Progressing

## 2025-05-20 PROCEDURE — 97530 THERAPEUTIC ACTIVITIES: CPT

## 2025-05-20 PROCEDURE — 6370000000 HC RX 637 (ALT 250 FOR IP)

## 2025-05-20 PROCEDURE — 6370000000 HC RX 637 (ALT 250 FOR IP): Performed by: STUDENT IN AN ORGANIZED HEALTH CARE EDUCATION/TRAINING PROGRAM

## 2025-05-20 PROCEDURE — 1200000000 HC SEMI PRIVATE

## 2025-05-20 PROCEDURE — 97110 THERAPEUTIC EXERCISES: CPT

## 2025-05-20 PROCEDURE — 2500000003 HC RX 250 WO HCPCS

## 2025-05-20 PROCEDURE — 97535 SELF CARE MNGMENT TRAINING: CPT

## 2025-05-20 PROCEDURE — 99232 SBSQ HOSP IP/OBS MODERATE 35: CPT | Performed by: STUDENT IN AN ORGANIZED HEALTH CARE EDUCATION/TRAINING PROGRAM

## 2025-05-20 RX ORDER — PRIMIDONE 50 MG/1
100 TABLET ORAL 2 TIMES DAILY
Qty: 120 TABLET | Refills: 0 | Status: SHIPPED | OUTPATIENT
Start: 2025-05-20 | End: 2025-06-19

## 2025-05-20 RX ORDER — GABAPENTIN 300 MG/1
600 CAPSULE ORAL 3 TIMES DAILY
Qty: 180 CAPSULE | Refills: 0 | Status: SHIPPED | OUTPATIENT
Start: 2025-05-20 | End: 2025-06-19

## 2025-05-20 RX ORDER — HYDROCODONE BITARTRATE AND ACETAMINOPHEN 5; 325 MG/1; MG/1
1 TABLET ORAL EVERY 4 HOURS PRN
Qty: 15 TABLET | Refills: 0 | Status: SHIPPED | OUTPATIENT
Start: 2025-05-20 | End: 2025-06-03

## 2025-05-20 RX ORDER — HYDROCODONE BITARTRATE AND ACETAMINOPHEN 5; 325 MG/1; MG/1
1 TABLET ORAL EVERY 4 HOURS PRN
Status: DISCONTINUED | OUTPATIENT
Start: 2025-05-20 | End: 2025-05-21 | Stop reason: HOSPADM

## 2025-05-20 RX ADMIN — SODIUM CHLORIDE, PRESERVATIVE FREE 10 ML: 5 INJECTION INTRAVENOUS at 08:32

## 2025-05-20 RX ADMIN — PRIMIDONE 100 MG: 50 TABLET ORAL at 20:46

## 2025-05-20 RX ADMIN — TERIFLUNOMIDE 14 MG: 14 TABLET, FILM COATED ORAL at 08:35

## 2025-05-20 RX ADMIN — SULFASALAZINE 500 MG: 500 TABLET ORAL at 16:33

## 2025-05-20 RX ADMIN — SULFASALAZINE 500 MG: 500 TABLET ORAL at 08:36

## 2025-05-20 RX ADMIN — HYDROCODONE BITARTRATE AND ACETAMINOPHEN 1 TABLET: 5; 325 TABLET ORAL at 22:49

## 2025-05-20 RX ADMIN — PAROXETINE 30 MG: 10 TABLET, FILM COATED ORAL at 08:35

## 2025-05-20 RX ADMIN — SULFASALAZINE 500 MG: 500 TABLET ORAL at 20:46

## 2025-05-20 RX ADMIN — SODIUM CHLORIDE, PRESERVATIVE FREE 10 ML: 5 INJECTION INTRAVENOUS at 20:47

## 2025-05-20 RX ADMIN — AMLODIPINE BESYLATE 5 MG: 5 TABLET ORAL at 08:30

## 2025-05-20 RX ADMIN — GABAPENTIN 600 MG: 300 CAPSULE ORAL at 20:46

## 2025-05-20 RX ADMIN — GABAPENTIN 600 MG: 300 CAPSULE ORAL at 08:30

## 2025-05-20 RX ADMIN — OXYCODONE 5 MG: 5 TABLET ORAL at 06:16

## 2025-05-20 RX ADMIN — PRIMIDONE 100 MG: 50 TABLET ORAL at 08:36

## 2025-05-20 RX ADMIN — OXYCODONE 5 MG: 5 TABLET ORAL at 10:26

## 2025-05-20 RX ADMIN — SULFASALAZINE 500 MG: 500 TABLET ORAL at 13:51

## 2025-05-20 RX ADMIN — TROSPIUM CHLORIDE 20 MG: 20 TABLET, FILM COATED ORAL at 06:17

## 2025-05-20 RX ADMIN — TROSPIUM CHLORIDE 20 MG: 20 TABLET, FILM COATED ORAL at 16:33

## 2025-05-20 RX ADMIN — Medication 4000 UNITS: at 08:30

## 2025-05-20 RX ADMIN — HYDROCODONE BITARTRATE AND ACETAMINOPHEN 1 TABLET: 5; 325 TABLET ORAL at 19:00

## 2025-05-20 RX ADMIN — GABAPENTIN 600 MG: 300 CAPSULE ORAL at 13:51

## 2025-05-20 RX ADMIN — PROPRANOLOL HYDROCHLORIDE 60 MG: 60 CAPSULE, EXTENDED RELEASE ORAL at 08:36

## 2025-05-20 ASSESSMENT — PAIN SCALES - GENERAL
PAINLEVEL_OUTOF10: 3
PAINLEVEL_OUTOF10: 7
PAINLEVEL_OUTOF10: 4
PAINLEVEL_OUTOF10: 4
PAINLEVEL_OUTOF10: 7
PAINLEVEL_OUTOF10: 8
PAINLEVEL_OUTOF10: 9
PAINLEVEL_OUTOF10: 5

## 2025-05-20 ASSESSMENT — PAIN DESCRIPTION - ORIENTATION
ORIENTATION: RIGHT

## 2025-05-20 ASSESSMENT — PAIN DESCRIPTION - DESCRIPTORS
DESCRIPTORS: DISCOMFORT
DESCRIPTORS: ACHING
DESCRIPTORS: DISCOMFORT
DESCRIPTORS: DULL

## 2025-05-20 ASSESSMENT — PAIN DESCRIPTION - LOCATION
LOCATION: LEG;ANKLE
LOCATION: LEG;ANKLE
LOCATION: LEG
LOCATION: LEG

## 2025-05-20 NOTE — PLAN OF CARE
Problem: Discharge Planning  Goal: Discharge to home or other facility with appropriate resources  5/20/2025 0429 by Julianne Goel RN  Outcome: Progressing     Problem: Skin/Tissue Integrity  Goal: Skin integrity remains intact  Description: 1.  Monitor for areas of redness and/or skin breakdown2.  Assess vascular access sites hourly3.  Every 4-6 hours minimum:  Change oxygen saturation probe site4.  Every 4-6 hours:  If on nasal continuous positive airway pressure, respiratory therapy assess nares and determine need for appliance change or resting period  5/20/2025 0429 by Julianne Goel RN  Outcome: Progressing     Problem: Safety - Adult  Goal: Free from fall injury  5/20/2025 0429 by Julianne Goel RN  Outcome: Progressing     Problem: ABCDS Injury Assessment  Goal: Absence of physical injury  5/20/2025 0429 by Julianne Goel RN  Outcome: Progressing     Problem: Pain  Goal: Verbalizes/displays adequate comfort level or baseline comfort level  5/20/2025 0429 by Julianne Goel RN  Outcome: Progressing

## 2025-05-20 NOTE — DISCHARGE INSTR - COC
Continuity of Care Form    Patient Name: Ana Brewster   :  1955  MRN:  [id number]    Admit date:  5/15/2025  Discharge date:  25    Code Status Order: Full Code   Advance Directives:    Date/Time Healthcare Directive Type of Healthcare Directive Copy in Chart Healthcare Agent Appointed Healthcare Agent's Name Healthcare Agent's Phone Number    05/15/25 0928 No, patient does not have an advance directive for healthcare treatment  --  --  --  --  --             Admitting Physician:  No admitting provider for patient encounter.  PCP: Liliam Edouard, APRN - CNP    Discharging Nurse: FLORIDA Cole  Discharging Hospital Unit/Room#: 0235/0235-01  Discharging Unit Phone Number: [phone]    Emergency Contact:   Extended Emergency Contact Information  Primary Emergency Contact: Sid Brewster  Address: [address] [place] [address]  Home Phone: [phone]  Work Phone: [phone]  Mobile Phone: [phone]  Relation: Spouse  Secondary Emergency Contact: AARON BREWSTER  Home Phone: [phone]  Work Phone: [phone]  Mobile Phone: [phone]  Relation: Child  Preferred language: English   needed? No    Past Surgical History:  Past Surgical History:   Procedure Laterality Date    ANKLE FRACTURE SURGERY  2021    left    ANKLE FRACTURE SURGERY Right 5/15/2025    OPEN REDUCTION INTERNAL FIXATION ANKLE FRACTURE performed by Jai Murillo DO at [facility] OR    APPENDECTOMY       SECTION      CHOLECYSTECTOMY      COLONOSCOPY  2013, 2015    CYST REMOVAL      FOOT SURGERY  2012    right foot bunionectomy    NERVE SURGERY N/A 2023    INTERSTIM IMPLANT STAGE 1 AND 2 performed by Low Villasenor MD at [facility] OR    ORIF ANKLE FRACTURE Right 05/15/2025    OTHER SURGICAL HISTORY  2023    INTERSTIM IMPLANT STAGE 1 AND 2    TONSILLECTOMY AND ADENOIDECTOMY      WRIST FRACTURE SURGERY         Immunization

## 2025-05-20 NOTE — ANESTHESIA POSTPROCEDURE EVALUATION
Department of Anesthesiology  Postprocedure Note    Patient: Ana Kang  MRN: [id number]  YOB: 1955  Date of evaluation: 5/20/2025    Procedure Summary       Date: 05/15/25 Room / Location: [address]    Anesthesia Start: 1134 Anesthesia Stop: 1353    Procedure: OPEN REDUCTION INTERNAL FIXATION ANKLE FRACTURE (Right) Diagnosis:       Bimalleolar ankle fracture, right, closed, initial encounter      (Bimalleolar ankle fracture, right, closed, initial encounter [S82.841A])    Surgeons: Jai Murillo DO Responsible Provider: Lety Mcelroy MD    Anesthesia Type: general ASA Status: 3            Anesthesia Type: No value filed.    Lex Phase I: Lex Score: 9    Lex Phase II:      Anesthesia Post Evaluation    Patient location during evaluation: bedside  Patient participation: complete - patient participated  Level of consciousness: awake and awake and alert  Pain score: 2  Airway patency: patent  Nausea & Vomiting: no nausea and no vomiting  Cardiovascular status: blood pressure returned to baseline and hemodynamically stable  Respiratory status: acceptable  Hydration status: euvolemic  Pain management: adequate        No notable events documented.

## 2025-05-20 NOTE — PLAN OF CARE
Problem: Discharge Planning  Goal: Discharge to home or other facility with appropriate resources  5/20/2025 1639 by Douglas Vieira RN  Outcome: Progressing  5/20/2025 0429 by Julianne Goel RN  Outcome: Progressing     Problem: Skin/Tissue Integrity  Goal: Skin integrity remains intact  Description: 1.  Monitor for areas of redness and/or skin breakdown2.  Assess vascular access sites hourly3.  Every 4-6 hours minimum:  Change oxygen saturation probe site4.  Every 4-6 hours:  If on nasal continuous positive airway pressure, respiratory therapy assess nares and determine need for appliance change or resting period  5/20/2025 1639 by Douglas Vieira RN  Outcome: Progressing  5/20/2025 0429 by Julianne Goel RN  Outcome: Progressing     Problem: Safety - Adult  Goal: Free from fall injury  5/20/2025 1639 by Douglas Vieira RN  Outcome: Progressing  5/20/2025 0429 by Julianne Goel RN  Outcome: Progressing     Problem: ABCDS Injury Assessment  Goal: Absence of physical injury  5/20/2025 1639 by Douglas Vieira RN  Outcome: Progressing  5/20/2025 0429 by Julianne Goel RN  Outcome: Progressing     Problem: Pain  Goal: Verbalizes/displays adequate comfort level or baseline comfort level  5/20/2025 1639 by Douglas Vieira RN  Outcome: Progressing  5/20/2025 0429 by Julianne Goel RN  Outcome: Progressing

## 2025-05-20 NOTE — CARE COORDINATION
Case Management   Daily Progress Note       Patient Name: Ana Kang                   YOB: 1955  Diagnosis: Closed right ankle fracture [S82.891A]  Closed right ankle fracture, initial encounter [S82.891A]                       GMLOS: 4.2 days  Length of Stay: 5  days    Anticipated Discharge Date: Ready for discharge    Readmission Risk (Low < 19, Mod (19-27), High > 27): Readmission Risk Score: 14.8        Current Transitional Plan    [] Home Independently    [] Home with HC    [x] Skilled Nursing Facility    [] Acute Rehabilitation    [] Long Term Acute Care (LTAC)    [] Other:     Plan for the Stay (Medical Management) :          Workflow Continuation (Additional Notes) : Called and left VM for Pura at [facility] re: referral, requested return call.    1200 Updated patient and pt's  per phone at bedside re: SNF denials, asked for more SNF choices. Pt's  states he will be up to visit patient in next hour and will review SNF list for more choices.  1520 Spoke with patient's  at bedside, he provides more SNF choices: 1.  [facility] 2. [facility] and 3. [facility]. Referrals sent to all via Epic.  1535 Called and spoke with Pura at [facility], she states they do not have a bed available until next week sometime.    Zarina Worthington RN  May 20, 2025

## 2025-05-21 VITALS
RESPIRATION RATE: 16 BRPM | SYSTOLIC BLOOD PRESSURE: 133 MMHG | DIASTOLIC BLOOD PRESSURE: 98 MMHG | HEIGHT: 61 IN | OXYGEN SATURATION: 90 % | BODY MASS INDEX: 51.82 KG/M2 | HEART RATE: 60 BPM | TEMPERATURE: 97.7 F | WEIGHT: 274.47 LBS

## 2025-05-21 LAB
ALBUMIN SERPL-MCNC: 3.4 G/DL (ref 3.5–5.2)
ALBUMIN/GLOB SERPL: 1.1 {RATIO} (ref 1–2.5)
ALP SERPL-CCNC: 69 U/L (ref 35–104)
ALT SERPL-CCNC: 10 U/L (ref 10–35)
ANION GAP SERPL CALCULATED.3IONS-SCNC: 9 MMOL/L (ref 9–16)
AST SERPL-CCNC: 23 U/L (ref 10–35)
BASOPHILS # BLD: 0.07 K/UL (ref 0–0.2)
BASOPHILS NFR BLD: 1 % (ref 0–2)
BILIRUB SERPL-MCNC: 0.3 MG/DL (ref 0–1.2)
BUN SERPL-MCNC: 16 MG/DL (ref 8–23)
CALCIUM SERPL-MCNC: 8.9 MG/DL (ref 8.6–10.4)
CHLORIDE SERPL-SCNC: 105 MMOL/L (ref 98–107)
CO2 SERPL-SCNC: 24 MMOL/L (ref 20–31)
CREAT SERPL-MCNC: 0.7 MG/DL (ref 0.6–0.9)
EOSINOPHIL # BLD: 0.27 K/UL (ref 0–0.44)
EOSINOPHILS RELATIVE PERCENT: 4 % (ref 1–4)
ERYTHROCYTE [DISTWIDTH] IN BLOOD BY AUTOMATED COUNT: 14.3 % (ref 11.8–14.4)
GFR, ESTIMATED: >90 ML/MIN/1.73M2
GLUCOSE SERPL-MCNC: 137 MG/DL (ref 74–99)
HCT VFR BLD AUTO: 36.1 % (ref 36.3–47.1)
HGB BLD-MCNC: 10.9 G/DL (ref 11.9–15.1)
IMM GRANULOCYTES # BLD AUTO: 0.07 K/UL (ref 0–0.3)
IMM GRANULOCYTES NFR BLD: 1 %
LYMPHOCYTES NFR BLD: 0.6 K/UL (ref 1.1–3.7)
LYMPHOCYTES RELATIVE PERCENT: 9 % (ref 24–43)
MCH RBC QN AUTO: 31.1 PG (ref 25.2–33.5)
MCHC RBC AUTO-ENTMCNC: 30.2 G/DL (ref 28.4–34.8)
MCV RBC AUTO: 103.1 FL (ref 82.6–102.9)
MONOCYTES NFR BLD: 0.8 K/UL (ref 0.1–1.2)
MONOCYTES NFR BLD: 12 % (ref 3–12)
MORPHOLOGY: ABNORMAL
NEUTROPHILS NFR BLD: 73 % (ref 36–65)
NEUTS SEG NFR BLD: 4.89 K/UL (ref 1.5–8.1)
NRBC BLD-RTO: 0 PER 100 WBC
PLATELET # BLD AUTO: 254 K/UL (ref 138–453)
PMV BLD AUTO: 9.7 FL (ref 8.1–13.5)
POTASSIUM SERPL-SCNC: 4 MMOL/L (ref 3.7–5.3)
PROT SERPL-MCNC: 6.5 G/DL (ref 6.6–8.7)
RBC # BLD AUTO: 3.5 M/UL (ref 3.95–5.11)
SODIUM SERPL-SCNC: 138 MMOL/L (ref 136–145)
WBC OTHER # BLD: 6.7 K/UL (ref 3.5–11.3)

## 2025-05-21 PROCEDURE — 2500000003 HC RX 250 WO HCPCS

## 2025-05-21 PROCEDURE — 36415 COLL VENOUS BLD VENIPUNCTURE: CPT

## 2025-05-21 PROCEDURE — 85025 COMPLETE CBC W/AUTO DIFF WBC: CPT

## 2025-05-21 PROCEDURE — 6370000000 HC RX 637 (ALT 250 FOR IP): Performed by: STUDENT IN AN ORGANIZED HEALTH CARE EDUCATION/TRAINING PROGRAM

## 2025-05-21 PROCEDURE — 6370000000 HC RX 637 (ALT 250 FOR IP)

## 2025-05-21 PROCEDURE — 80053 COMPREHEN METABOLIC PANEL: CPT

## 2025-05-21 PROCEDURE — 99232 SBSQ HOSP IP/OBS MODERATE 35: CPT | Performed by: STUDENT IN AN ORGANIZED HEALTH CARE EDUCATION/TRAINING PROGRAM

## 2025-05-21 RX ADMIN — PROPRANOLOL HYDROCHLORIDE 60 MG: 60 CAPSULE, EXTENDED RELEASE ORAL at 08:34

## 2025-05-21 RX ADMIN — AMLODIPINE BESYLATE 5 MG: 5 TABLET ORAL at 08:30

## 2025-05-21 RX ADMIN — PAROXETINE 30 MG: 10 TABLET, FILM COATED ORAL at 08:34

## 2025-05-21 RX ADMIN — GABAPENTIN 600 MG: 300 CAPSULE ORAL at 13:43

## 2025-05-21 RX ADMIN — SULFASALAZINE 500 MG: 500 TABLET ORAL at 13:44

## 2025-05-21 RX ADMIN — TERIFLUNOMIDE 14 MG: 14 TABLET, FILM COATED ORAL at 08:30

## 2025-05-21 RX ADMIN — PRIMIDONE 100 MG: 50 TABLET ORAL at 08:34

## 2025-05-21 RX ADMIN — TROSPIUM CHLORIDE 20 MG: 20 TABLET, FILM COATED ORAL at 06:29

## 2025-05-21 RX ADMIN — HYDROCODONE BITARTRATE AND ACETAMINOPHEN 1 TABLET: 5; 325 TABLET ORAL at 06:29

## 2025-05-21 RX ADMIN — SULFASALAZINE 500 MG: 500 TABLET ORAL at 08:34

## 2025-05-21 RX ADMIN — SODIUM CHLORIDE, PRESERVATIVE FREE 10 ML: 5 INJECTION INTRAVENOUS at 08:34

## 2025-05-21 RX ADMIN — Medication 4000 UNITS: at 08:30

## 2025-05-21 RX ADMIN — GABAPENTIN 600 MG: 300 CAPSULE ORAL at 08:30

## 2025-05-21 ASSESSMENT — PAIN DESCRIPTION - LOCATION: LOCATION: LEG;ANKLE

## 2025-05-21 ASSESSMENT — PAIN SCALES - GENERAL: PAINLEVEL_OUTOF10: 7

## 2025-05-21 ASSESSMENT — PAIN DESCRIPTION - DESCRIPTORS: DESCRIPTORS: ACHING

## 2025-05-21 ASSESSMENT — PAIN DESCRIPTION - ORIENTATION: ORIENTATION: RIGHT

## 2025-05-21 NOTE — PROGRESS NOTES
Called and gave report to FLORIDA Oliveira at [facility], all questions answered.  
[facility]  Office: [phone]  Roberto Steven DO, Jordan Jimenez, DO, Willie Byers DO, Miguelangel Baird, DO, Patrick Quinn MD, Yaritza Burger MD, Jocelynn Medel MD, Leanna Cummings MD,  Marco Jones MD, David Patino MD, Sophie Roblero MD,  Zofia Pond DO, Ketty Madison MD, Chito Holley MD, Christos Steven DO, Francisca Miner MD,  Darci Barbour DO, Maryann Cuenca MD, Mitzy Bueno MD, Reina Sebastian MD,  Sacha Lofton MD, Kati Abraham MD, Cecily Stout MD, Jaswant Abel MD, Josh Fine MD, Santos Aarya MD, Eliezer Perez DO, Marisol Antunez MD, Tutu Tyler MD, Zofia Martinez MD, Mohsin Reza, MD, Lawanda Yadav MD, Shirley Waterhouse, CNP,  Aletha Saleh, CNP, Eliezer Gutierrez, CNP,  Nayeli Laird, DNP, Verna Carrera, CNP, Savannah Gonzales, CNP, Lilly Crawford, CNP, Yelitza Henley, CNP, Becca Hawley, PA-C, Barby Howard, CNP, Vicki Reese, CNP,  Heather Santos, CNP, Christina Fuchs, CNP, Sourav Turpin, PA-C, Bertha Barrios, CNP,  Susan Byers, CNS, Alina Cordero, CNP, Fiona Servin, CNP,   Gianna Jones, CNP         [facility]   IN-PATIENT SERVICE   [facility]    Progress Note    5/17/2025    12:50 PM    Name:   Ana Kang  MRN:     [id number]     Acct:      [id number]   Room:   0235/0235-01  IP Day:  2  Admit Date:  5/15/2025 12:27 AM    PCP:   Liliam Edouard APRN - CNP  Code Status:  Full Code    Subjective:     C/C: No chief complaint on file.    Interval History Status: improved.     Patient was seen and examined at time of my evaluation she was seen resting in her bed, in no acute distress denies any complaint  , lab Vital sign consultant notes reviewed    Brief History:     69 y.o. Non- / non  female with history of Crohn disease, MS, hypertension, overactive bladder and anxiety who presents with No chief complaint on file.   and is admitted to the hospital for the management of Ankle fracture, 
Occupational Therapy    Occupational Therapy Initial Evaluation  Facility/Department: [address] ORTHO/MED SURG   Patient Name: Ana Kang        MRN: [id number]    : 1955    Date of Service: 2025    No chief complaint on file.    Past Medical History:  has a past medical history of Anxiety, Crohn's disease (HCC), Diverticulosis, Fatigue, Hepatitis, Hypertension, Impaired functional mobility, balance, gait, and endurance, Morbid obesity (HCC), Multiple sclerosis (HCC), Osteopenia, Psoriasis, Tremor, Under care of team, Urge incontinence, and Well adult exam.  Past Surgical History:  has a past surgical history that includes Wrist fracture surgery;  section; Cholecystectomy; Tonsillectomy and adenoidectomy; Appendectomy; cyst removal; Foot surgery (2012); Colonoscopy (2013, 2015); Ankle fracture surgery (2021); other surgical history (2023); Nerve Surgery (N/A, 2023); and ORIF ankle fracture (Right, 05/15/2025).    Discharge Recommendations  Discharge Recommendations: Continue to assess pending progress, Patient would benefit from continued therapy after discharge, Patient able to tolerate 3 hours of therapy per day  OT Equipment Recommendations  Equipment Needed: No    Assessment  Performance deficits / Impairments: Decreased functional mobility ;Decreased ADL status;Decreased strength;Decreased balance;Decreased coordination;Decreased endurance  Assessment: Patient is normaly mofied independent with functional mobility using rollator in home/wheelchair in community, indep/modified indep with most personal ADLs and family manages household tasks. She presents to OT eval with above deficit affecting ability to particpate in ADLs/ADL related mobility after R ankle ORIF with NWB precautions. At this time patient requires max assist x2 with functional mobility and up to  min assist with UB ADLs, up to max assist  LB ADLs. Patient would benefit from continued 
Occupational Therapy  Occupational Therapy Daily Treatment Note  Facility/Department: [address] ORTHO/MED SURG   Patient Name: Ana Kang        MRN: [id number]    : 1955    Date of Service: 2025      Past Medical History:  has a past medical history of Anxiety, Crohn's disease (HCC), Diverticulosis, Fatigue, Hepatitis, Hypertension, Impaired functional mobility, balance, gait, and endurance, Morbid obesity (HCC), Multiple sclerosis (HCC), Osteopenia, Psoriasis, Tremor, Under care of team, Urge incontinence, and Well adult exam.  Past Surgical History:  has a past surgical history that includes Wrist fracture surgery;  section; Cholecystectomy; Tonsillectomy and adenoidectomy; Appendectomy; cyst removal; Foot surgery (2012); Colonoscopy (2013, 2015); Ankle fracture surgery (2021); other surgical history (2023); Nerve Surgery (N/A, 2023); ORIF ankle fracture (Right, 05/15/2025); and Ankle fracture surgery (Right, 5/15/2025).    Discharge Recommendations  Discharge Recommendations: Patient would benefit from continued therapy after discharge       Assessment  Performance deficits / Impairments: Decreased functional mobility ;Decreased ADL status;Decreased strength;Decreased balance;Decreased coordination;Decreased endurance;Decreased posture  Assessment: Pt limited by above deficits impacting pts functional mobility/ADL performance. Pt is expected to require skilled OT services to increase safety and promote increased independence t/o ADL/IADLs and functional mobility tasks.  Prognosis: Good  Activity Tolerance  Activity Tolerance: Patient Tolerated treatment well;Patient limited by fatigue  Activity Tolerance Comments: Fearful of falling. BUE tremors.  Safety Devices  Type of Devices: Bed alarm in place;Call light within reach;Gait belt;Nurse notified;Left in bed;Heels elevated for pressure relief;Patient at risk for falls  Restraints  Restraints Initially 
Occupational Therapy  Occupational Therapy Daily Treatment Note  Facility/Department: [address] ORTHO/MED SURG   Patient Name: Ana Kang        MRN: [id number]    : 1955    Date of Service: 2025    Past Medical History:  has a past medical history of Anxiety, Crohn's disease (HCC), Diverticulosis, Fatigue, Hepatitis, Hypertension, Impaired functional mobility, balance, gait, and endurance, Morbid obesity (HCC), Multiple sclerosis (HCC), Osteopenia, Psoriasis, Tremor, Under care of team, Urge incontinence, and Well adult exam.  Past Surgical History:  has a past surgical history that includes Wrist fracture surgery;  section; Cholecystectomy; Tonsillectomy and adenoidectomy; Appendectomy; cyst removal; Foot surgery (2012); Colonoscopy (2013, 2015); Ankle fracture surgery (2021); other surgical history (2023); Nerve Surgery (N/A, 2023); ORIF ankle fracture (Right, 05/15/2025); and Ankle fracture surgery (Right, 5/15/2025).    Discharge Recommendations  Discharge Recommendations: Patient would benefit from continued therapy after discharge  OT Equipment Recommendations  Equipment Needed:  (CTA pending progress. Pt is unsafe to attempt functional transfers/mobility without skilled assistance of 2 at this time)    Assessment  Performance deficits / Impairments: Decreased functional mobility ;Decreased ADL status;Decreased strength;Decreased balance;Decreased coordination;Decreased endurance;Decreased posture  Assessment: Pt agreed to OT tx this date. Pt is making slow progress towards goals however continuing to demonstrate deficits in balance, endurance, strength, and pain management. Session focused primarily on LB dressing with use of AE with Min A and functional transfers that required Max A X2 with use of RW then trialling Airam Nelson with ability to achieve lift off from EOB however unable to stand fully erect. Pt will continue to require OT services to 
[facility]  Office: [phone]  Roberto Steven DO, Jordan Jimenez, DO, Willie Byers DO, Miguelangel Baird, DO, Patrick Quinn MD, Yaritza Burger MD, Jocelynn Medel MD, Leanna Cummings MD,  Marco Jones MD, David Patino MD, Sophie Roblero MD,  Zofia Pond DO, Ketty Madison MD, Chito Holley MD, Christos Steven DO, Francisca Miner MD,  Darci Barbour DO, Maryann Cuenca MD, Mitzy Bueno MD, Reina Sebastian MD,  Sacha Lofton MD, Kati Abraham MD, Cecily Stout MD, Jaswant Abel MD, Josh Fine MD, Santos Araya MD, Eliezer Perez DO, Marisol Antunez MD, Tutu Tyler MD, Zofia Martinez MD, Mohsin Reza, MD, Lawanda Yadav MD, Shirley Waterhouse, CNP,  Aletha Saleh, CNP, Eliezer Gutierrez, CNP,  Nayeli Laird, DNP, Verna Carrera, CNP, Savannah Gonzales, CNP, Lilly Crawford, CNP, Yelitza Henley, CNP, Becca Hawley, PA-C, Barby Howard, CNP, Vicki Reese, CNP,  Heather Santos, CNP, Christina Fuchs, CNP, Sourav Turpin, PA-C, Bertha Barrios, CNP,  Susan Byers, CNS, Alina Cordero, CNP, Fiona Servin, CNP,   Gianna Jones, CNP         [facility]   IN-PATIENT SERVICE   [facility]    Progress Note    5/20/2025    9:49 AM    Name:   Ana Kang  MRN:     [id number]     Acct:      [id number]   Room:   0235/0235-01  IP Day:  5  Admit Date:  5/15/2025 12:27 AM    PCP:   Liliam Edouard APRN - CNP  Code Status:  Full Code    Subjective:     C/C: ankle pain  Interval History Status: not changed.     Patient seen and examined.  She reports pain in her ankle.  She has been working with physical therapy and is trying to stand.  No chest pain or shortness of breath.  She is waiting for SNF placement.  Blood pressure stable  Brief History:     69 y.o. Non- / non  female with history of Crohn disease, MS, hypertension, overactive bladder and anxiety and is admitted to the hospital for the management of Ankle fracture, bimalleolar, closed, 
[facility]  Office: [phone]  Roberto Steven DO, Jordan Jimenez, DO, Willie Byers DO, Miguelangel Baird, DO, Patrick Quinn MD, Yaritza Burger MD, Jocelynn Medel MD, Leanna Cummings MD,  Marco Jones MD, David Patino MD, Sophie Roblero MD,  Zofia Pond DO, Ketty Madison MD, Chito Holley MD, Christos Steven DO, Francisca Miner MD,  Darci Barbour DO, Maryann Cuenca MD, Mitzy Bueno MD, Reina Sebastian MD,  Sacha Lofton MD, Kati Abraham MD, Cecily Stout MD, Jaswant Abel MD, Josh Fine MD, Santos Araya MD, Eliezer Perez DO, Marisol Antunez MD, Tutu Tyler MD, Zofia Martinez MD, Mohsin Reza, MD, Lawanda Yadav MD, Shirley Waterhouse, CNP,  Aletha Saleh, CNP, Eliezer Gutierrez, CNP,  Nayeli Laird, DNP, Verna Carrera, CNP, Savannah Gonzales, CNP, Lilly Crawford, CNP, Yelitza Henley, CNP, Becca Hawley, PA-C, Barby Howard, CNP, Vicki Reese, CNP,  Heather Santos, CNP, Christina Fuchs, CNP, Sourav Turpin, PA-C, Bertha Barrios, CNP,  Susan Byers, CNS, Alina Cordero, CNP, Fiona Servin, CNP,   Gianna Jones, CNP         [facility]   IN-PATIENT SERVICE   [facility]    Progress Note    5/21/2025    11:02 AM    Name:   Ana Kang  MRN:     [id number]     Acct:      [id number]   Room:   0235/0235-01  IP Day:  6  Admit Date:  5/15/2025 12:27 AM    PCP:   Liliam Edouard APRN - CNP  Code Status:  Full Code    Subjective:     C/C: ankle pain  Interval History Status: not changed.     Patient seen and examined.    No chest pain or shortness of breath  She is worried about her placement and states that she can bring in her own for multiple sclerosis medications if that helps.  Vitals reviewed  Brief History:     69 y.o. Non- / non  female with history of Crohn disease, MS, hypertension, overactive bladder and anxiety and is admitted to the hospital for the management of Ankle fracture, bimalleolar, closed, right, initial 
[facility]  Office: [phone]  Roberto Steven DO, Jordan Jimenez, DO, Willie Byers DO, Miguelangel Baird, DO, Patrick Quinn MD, Yaritza Burger MD, Jocelynn Medel MD, Leanna Cummings MD,  Marco Jones MD, David Patino MD, Sophie Roblero MD,  Zofia Pond DO, Ketty Madison MD, Chito Holley MD, Christos Steven DO, Francisca Miner MD,  Darci Barbour DO, Maryann Cuenca MD, Mitzy Bueno MD, Reina Sebastian MD,  Sacha Lofton MD, Kati Abraham MD, Cecily Stout MD, Jaswant Abel MD, Josh Fine MD, Santos Araya MD, Eliezer Perez DO, Marisol Antunez MD, Tutu Tyler MD, Zofia Martinez MD, Mohsin Reza, MD, Lawanda Yadav MD, Shirley Waterhouse, CNP,  Aletha Saleh, CNP, Eliezer Gutierrez, CNP,  Nayeli Laird, DNP, Verna Carrera, CNP, Savannah Gonzales, CNP, Lilly Crawford, CNP, Yelitza Henley, CNP, Becca Hawley, PA-C, Barby Howard, CNP, Vicki Reese, CNP,  Heather Santos, CNP, Christina Fuchs, CNP, Sourav Turpin, PA-C, Bertha Barrios, CNP,  Susan Byers, CNS, Alina Cordero, CNP, Fiona Servin, CNP,   Gianna Jones, CNP         [facility]   IN-PATIENT SERVICE   [facility]    Progress Note    5/18/2025    12:49 PM    Name:   Ana Kang  MRN:     [id number]     Acct:      [id number]   Room:   0235/0235-01  IP Day:  3  Admit Date:  5/15/2025 12:27 AM    PCP:   Liliam Edouard APRN - CNP  Code Status:  Full Code    Subjective:     C/C: No chief complaint on file.    Interval History Status: improved.     Patient was seen and examined at time of my evaluation she was seen resting in her bed, in no acute distress denies any complaint  , lab Vital sign consultant notes reviewed    Brief History:     69 y.o. Non- / non  female with history of Crohn disease, MS, hypertension, overactive bladder and anxiety who presents with No chief complaint on file.   and is admitted to the hospital for the management of Ankle fracture, 
Physical Therapy  Facility/Department: [address] ORTHO/MED SURG   Physical Therapy Daily Treatment Note    Patient Name: Ana Kang        MRN: [id number]    : 1955    Date of Service: 2025    No chief complaint on file.    Past Medical History:  has a past medical history of Anxiety, Crohn's disease (HCC), Diverticulosis, Fatigue, Hepatitis, Hypertension, Impaired functional mobility, balance, gait, and endurance, Morbid obesity (HCC), Multiple sclerosis (HCC), Osteopenia, Psoriasis, Tremor, Under care of team, Urge incontinence, and Well adult exam.  Past Surgical History:  has a past surgical history that includes Wrist fracture surgery;  section; Cholecystectomy; Tonsillectomy and adenoidectomy; Appendectomy; cyst removal; Foot surgery (2012); Colonoscopy (2013, 2015); Ankle fracture surgery (2021); other surgical history (2023); Nerve Surgery (N/A, 2023); ORIF ankle fracture (Right, 05/15/2025); and Ankle fracture surgery (Right, 5/15/2025).    Discharge Recommendations  Discharge Recommendations: Patient would benefit from continued therapy after discharge  PT Equipment Recommendations  Equipment Needed: No  Other: Pt reported owning rollator and WC    Assessment  Body Structures, Functions, Activity Limitations Requiring Skilled Therapeutic Intervention: Decreased functional mobility ;Decreased strength;Decreased endurance;Decreased balance;Increased pain  Assessment: Pt completes sit to stand with CHRIS and jovan elliott and max Ax2. Pt currently unsafe to return to prior living situation d/t need for skilled assistance with all functional mobility. pt would benefit from continued therapy to promote endurance, balance, and strengthening.  Therapy Prognosis: Good  Decision Making: Medium Complexity  Requires PT Follow-Up: Yes  Activity Tolerance  Activity Tolerance: Patient limited by fatigue;Patient limited by pain;Patient limited by endurance  Safety 
Physical Therapy  Facility/Department: [address] ORTHO/MED SURG   Physical Therapy Daily Treatment Note    Patient Name: Ana Kang        MRN: [id number]    : 1955    Date of Service: 2025    No chief complaint on file.    Past Medical History:  has a past medical history of Anxiety, Crohn's disease (HCC), Diverticulosis, Fatigue, Hepatitis, Hypertension, Impaired functional mobility, balance, gait, and endurance, Morbid obesity (HCC), Multiple sclerosis (HCC), Osteopenia, Psoriasis, Tremor, Under care of team, Urge incontinence, and Well adult exam.  Past Surgical History:  has a past surgical history that includes Wrist fracture surgery;  section; Cholecystectomy; Tonsillectomy and adenoidectomy; Appendectomy; cyst removal; Foot surgery (2012); Colonoscopy (2013, 2015); Ankle fracture surgery (2021); other surgical history (2023); Nerve Surgery (N/A, 2023); ORIF ankle fracture (Right, 05/15/2025); and Ankle fracture surgery (Right, 5/15/2025).    Discharge Recommendations  Discharge Recommendations: Patient would benefit from continued therapy after discharge  PT Equipment Recommendations  Equipment Needed: No  Other: Pt reported owning rollator and WC    Assessment  Body Structures, Functions, Activity Limitations Requiring Skilled Therapeutic Intervention: Decreased functional mobility ;Decreased strength;Decreased endurance;Decreased balance;Increased pain  Assessment: Pt required maxA x2 for sup<>sit with HOB elevated. Pt completed x5 sit<>stands with RW and max assist x 2, while maintaining NWB to R LE. Pt most limited by significant LE weakness and decreased tolerance to mobility. Pt could benefit from a continuation of PT for transfers, strengthening and functional mobility prior to DC  Therapy Prognosis: Good  Decision Making: Medium Complexity  Activity Tolerance  Activity Tolerance: Patient limited by fatigue;Patient limited by pain;Patient 
[facility]  Office: [phone]  Roberto Steven DO, Jordan Jimenez, DO, Willie Byers DO, Miguelangel Baird, DO, Patrick Quinn MD, Yaritza Burger MD, Jocelynn Medel MD, Leanna Cummings MD,  Marco Jones MD, David Patino MD, Sophie Roblero MD,  Zofia Pond DO, Ketty Madison MD, Chito Holley MD, Christos Steven DO, Francisca Miner MD,  Darci Barbour DO, Maryann Cuenca MD, Mitzy Bueno MD, Reina Sebastian MD,  Sacha Lofton MD, Kati Abraham MD, Cecily Stout MD, Jaswant Abel MD, Josh Fine MD, Santos Araya MD, Eliezer Perez DO, Marisol Antunez MD, Tutu Tyler MD, Zofia Martinez MD, Mohsin Reza, MD, Lawanda Yadav MD, Shirley Waterhouse, CNP,  Aletha Saleh, CNP, Eliezer Gutierrez, CNP,  Nayeli Laird, DNP, Verna Carrera, CNP, Savannah Gonzales, CNP, Lilly Crawford, CNP, Yelitza Henley, CNP, Bceca Hawley, PA-C, Barby Howard, CNP, Vicki Reese, CNP,  Heather Santos, CNP, Christina Fuchs, CNP, Sourav Turpin, PA-C, Bertha Barrios, CNP,  Susan Byers, CNS, Alina Cordero, CNP, Fiona Servin, CNP,   Gianna Jones, CNP         [facility]   IN-PATIENT SERVICE   [facility]    Progress Note    5/16/2025    1:08 PM    Name:   Ana Kang  MRN:     [id number]     Acct:      [id number]   Room:   0235/0235-01   Day:  1  Admit Date:  5/15/2025 12:27 AM    PCP:   Liliam Edouard APRN - CNP  Code Status:  Full Code    Subjective:     C/C: No chief complaint on file.    Interval History Status: improved.     Patient was seen and examined at time of my evaluation she was seen resting in her bed, in no acute distress denies any complaint  , lab Vital sign consultant notes reviewed  Blood pressure in the lower side, will hold lisinopril and hydrochlorothiazide, will continue with amlodipine with holding parameter, will continue to monitor    Brief History:     69 y.o. Non- / non  female with history of Crohn disease, MS, hypertension, 
Diet: Adult diet okay from orthopedic perspective  - Encourage Incentive Spirometry use  - PT/OT will evaluate today   - Okay to discharge home or to a SNF pending evaluation by PT and completion of post-op ancef   - F/u with Dr. Murillo in his office on 6/2  - Please page Ortho on call with any questions          Marco Fountain DO  Orthopedic Surgery Resident, PGY-2  [place], [place]               
Inpatient   How much help is needed turning from your back to your side while in a flat bed without using bedrails?: A Little  How much help is needed moving from lying on your back to sitting on the side of a flat bed without using bedrails?: A Little  How much help is needed moving to and from a bed to a chair?: Total  How much help is needed standing up from a chair using your arms?: A Lot  How much help is needed walking in hospital room?: Total  How much help is needed climbing 3-5 steps with a railing?: Total  AM-PAC Inpatient Mobility Raw Score : 11  AM-PAC Inpatient T-Scale Score : 33.86  Mobility Inpatient CMS 0-100% Score: 72.57  Mobility Inpatient CMS G-Code Modifier : CL    Restrictions/Precautions  Restrictions/Precautions  Restrictions/Precautions: Weight Bearing  Activity Level: Up with Assist  Required Braces or Orthoses?: Yes  Lower Extremity Weight Bearing Restrictions  Right Lower Extremity Weight Bearing: Non Weight Bearing  Required Braces or Orthoses  Right Lower Extremity Brace:  (Splint)       Subjective  General  Patient assessed for rehabilitation services?: Yes  Response To Previous Treatment: Not applicable  Family/Caregiver Present: No  Follows Commands: Within Functional Limits  Subjective  Subjective: The pt was pleasant, agreeable to a PT evaluation  Pain  Pre-Pain: 8  Post-Pain: 8  Pain Location: Right;Leg  Pain Descriptor: Aching  Pain Interventions: Repositioning;Rest    Home Setup/Prior Level of Function  Social/Functional History  Lives With: Spouse  Type of Home: House  Home Layout: Two level, Able to Live on Main level with bedroom/bathroom (Master bath/bed on 1rst level)  Home Access: Stairs to enter without rails  Entrance Stairs - Number of Steps: 2  Entrance Stairs - Rails: None  Bathroom Shower/Tub: Walk-in shower  Bathroom Toilet: Handicap height  Bathroom Equipment: Built-in shower seat, Grab bars in shower  Bathroom Accessibility: Not accessible (Leaves rolloator in 
is admitted to the hospital for the management of Ankle fracture, bimalleolar, closed, right, initial encounter.    Review of Systems:     Review of Systems   Constitutional:  Negative for appetite change, fatigue and fever.   HENT:  Negative for sore throat and trouble swallowing.    Eyes:  Negative for visual disturbance.   Respiratory:  Negative for cough, shortness of breath and wheezing.    Cardiovascular:  Negative for chest pain, palpitations and leg swelling.   Gastrointestinal:  Negative for abdominal pain, blood in stool, constipation, diarrhea, nausea and vomiting.   Endocrine: Negative for polydipsia and polyuria.   Genitourinary:  Negative for decreased urine volume, difficulty urinating, dysuria, flank pain, frequency, hematuria and urgency.   Musculoskeletal:  Negative for back pain, gait problem and joint swelling.   Skin:  Negative for color change, rash and wound.   Allergic/Immunologic: Negative for immunocompromised state.   Neurological:  Negative for dizziness, syncope, speech difficulty, weakness, light-headedness, numbness and headaches.   Hematological:  Negative for adenopathy.   Psychiatric/Behavioral:  Negative for agitation, behavioral problems and sleep disturbance. The patient is not nervous/anxious.        Medications:     Allergies:  No Known Allergies    Current Meds:   Scheduled Meds:    teriflunomide  14 mg Oral Daily    sodium chloride flush  5-40 mL IntraVENous 2 times per day    amLODIPine  5 mg Oral Daily    gabapentin  600 mg Oral TID    PARoxetine  30 mg Oral Daily    primidone  100 mg Oral BID    propranolol  60 mg Oral Daily    sulfaSALAzine  500 mg Oral 4x Daily    trospium  20 mg Oral BID AC    Vitamin D  4,000 Units Oral Daily    [Held by provider] lisinopril  20 mg Oral Daily    And    [Held by provider] hydroCHLOROthiazide  25 mg Oral Daily     Continuous Infusions:    sodium chloride       PRN Meds: sodium chloride flush, sodium chloride, potassium chloride **OR** 
orthopedic standpoint  - Ice/Elevate to control pain and edema  - Diet: Adult diet okay from orthopedic perspective  - Encourage Incentive Spirometry use  - PT/OT will evaluate today   - Okay to discharge home or to a SNF pending evaluation by PT and completion of post-op ancef   - F/u with Dr. Murillo in his office on 6/2  - Please page Ortho on call with any questions        Stephen Luevano,   Orthopedic Surgery, PGY-1  [place], [place]               
assistance (Don sock over R foot d/t limited reach.)  Toileting: Setup;Increased time to complete;Maximum assistance (Pericare/bottom care/brief mgnt supine in bed d/t limited reach.)  Additional Comments: Pt supine in bed upon therapist arrival. Pt completed supine/sit transfer to seated EOB. Sit/stand transfer using RW for static standing. Pt able to clear bottom from EOB w/mild forward flexion at waist x5 trials. Pt unable to stay standing for more than 15 seconds at a time d/t fatigue and fear of falling. Max A x2 to slide pt bottom back farther onto EOB. Sit/supine transfer back into bed. Rolling R/L for pericare/bottom care/brief mgnt. Pt positioned seated in bed w/HOB elevated. Pt completed simple grooming. Pt displayed BUE tremors when completing ADLs, more so on the left than right. Pt educated on possible weighted silverware if tremors started to interfere with eating, pt verbalized understanding. Pt educated on use of incentive spirometer, pt verbalized/demo understanding w/multiple verbal cues for correct technique. Pt retired to supine in bed at end of session with all needs met.    Balance  Balance  Sitting: With support (SBA/CGA seated EOB x25 minutes.)  Standing: With support (Max A x2 static standing EOB using RW x5 trials lasting 10-15 seconds each.Pt able to maintain NWB RLE with fair return.)    Transfers/Mobility  Bed mobility  Rolling to Left: Partial/Moderate assistance (To manage RLE.)  Rolling to Right: Minimal assistance  Supine to Sit: Substantial/Maximal assistance;2 Person assistance  Sit to Supine: Substantial/Maximal assistance;2 Person assistance  Scooting: Substantial/Maximal assistance;2 Person assistance (Scoot bottom back from EOB seated.)  Bed Mobility Comments: HOB elevated. Increased time needed to complete. Pt attemting to assist with bed mobility with poor/fair return. Rolling R/L for pericare/bottom care/brief mgnt.    Transfers  Sit to stand: Maximum assistance;2 Person 
Safety education & training, Therapeutic activities    Goals  Short Term Goals  Time Frame for Short Term Goals: 10 visits  Short Term Goal 1: min assist with bed mobility  Short Term Goal 2: sit to stand with a RW x min assist with NWB R LE  Short Term Goal 3: bed to chair with a RW x min assist with  NWB R LE  Short Term Goal 4: 20 min strengthening exercise program x SBA    Minutes  PT Individual Minutes  Time In: 1023  Time Out: 1104  Minutes: 41  Time Code Minutes  Timed Code Treatment Minutes: 38 Minutes    Co- treatment with OT warranted secondary to decreased patient safety and independence with functional mobility requiring skilled physical assistance of two professionals to simultaneously address individualized discipline goals. PT is addressing bed mobility, transfer training , while OT is addressing their individualized functional mobility/self-care task.      Electronically signed by Tawanna Phillip PTA on 5/19/25 at 3:49 PM EDT

## 2025-05-21 NOTE — PLAN OF CARE
Problem: Discharge Planning  Goal: Discharge to home or other facility with appropriate resources  5/21/2025 1352 by Douglas Vieira RN  Outcome: Adequate for Discharge  5/21/2025 0452 by Julianne Goel RN  Outcome: Progressing     Problem: Skin/Tissue Integrity  Goal: Skin integrity remains intact  Description: 1.  Monitor for areas of redness and/or skin breakdown2.  Assess vascular access sites hourly3.  Every 4-6 hours minimum:  Change oxygen saturation probe site4.  Every 4-6 hours:  If on nasal continuous positive airway pressure, respiratory therapy assess nares and determine need for appliance change or resting period  5/21/2025 1352 by Douglas Vieira RN  Outcome: Adequate for Discharge  5/21/2025 0452 by Julianne Goel RN  Outcome: Progressing     Problem: Safety - Adult  Goal: Free from fall injury  5/21/2025 1352 by Douglas Vieira RN  Outcome: Adequate for Discharge  5/21/2025 0452 by Julianne Goel RN  Outcome: Progressing     Problem: ABCDS Injury Assessment  Goal: Absence of physical injury  5/21/2025 1352 by Douglas Vieira RN  Outcome: Adequate for Discharge  5/21/2025 0452 by Julianne Goel RN  Outcome: Progressing     Problem: Pain  Goal: Verbalizes/displays adequate comfort level or baseline comfort level  5/21/2025 1352 by Douglas Vieira RN  Outcome: Adequate for Discharge  5/21/2025 0452 by Julianne Goel RN  Outcome: Progressing

## 2025-05-21 NOTE — CARE COORDINATION
Case Management   Daily Progress Note       Patient Name: Ana Kang                   YOB: 1955  Diagnosis: Closed right ankle fracture [S82.671A]  Closed right ankle fracture, initial encounter [S82.891A]                       GMLOS: 4.2 days  Length of Stay: 6  days    Anticipated Discharge Date: Ready for discharge    Readmission Risk (Low < 19, Mod (19-27), High > 27): Readmission Risk Score: 14.5        Current Transitional Plan    [] Home Independently    [] Home with HC    [x] Skilled Nursing Facility    [] Acute Rehabilitation    [] Long Term Acute Care (LTAC)    [] Other:     Plan for the Stay (Medical Management) :          Workflow Continuation (Additional Notes) : Spoke with Hood at [facility] he states they are able to accept patient today, he states pt will go to a private room. Called and updated pt's , he is agreeable to [facility].    1155 Spoke with Sisi at [facility], transport is set for 2:15 pm. Called and updated Hood at [facility] and pt's  per phone.    Report # [phone]  Fax # [phone] Faxed AVS to [facility]. HENS completed.    Zarina Worthington, RN  May 21, 2025

## 2025-05-21 NOTE — PLAN OF CARE
Problem: Discharge Planning  Goal: Discharge to home or other facility with appropriate resources  5/21/2025 0452 by Julianne Goel RN  Outcome: Progressing     Problem: Skin/Tissue Integrity  Goal: Skin integrity remains intact  Description: 1.  Monitor for areas of redness and/or skin breakdown2.  Assess vascular access sites hourly3.  Every 4-6 hours minimum:  Change oxygen saturation probe site4.  Every 4-6 hours:  If on nasal continuous positive airway pressure, respiratory therapy assess nares and determine need for appliance change or resting period  5/21/2025 0452 by Julianne Goel RN  Outcome: Progressing     Problem: Safety - Adult  Goal: Free from fall injury  5/21/2025 0452 by Julianne Goel RN  Outcome: Progressing     Problem: ABCDS Injury Assessment  Goal: Absence of physical injury  5/21/2025 0452 by Julianne Goel RN  Outcome: Progressing     Problem: Pain  Goal: Verbalizes/displays adequate comfort level or baseline comfort level  5/21/2025 0452 by Julianne Goel RN  Outcome: Progressing

## 2025-05-21 NOTE — CARE COORDINATION
Discharge Report    [facility]  Clinical Case Management Department  Written by: Zarina Worthington RN    Patient Name: Ana Kang  Attending Provider: Ketty Madison MD  Admit Date: 5/15/2025 12:27 AM  MRN: [id number]  Account: [id number]                     : 1955  Discharge Date: 2025      Disposition: [facility]    Zarina Worthington RN

## 2025-05-24 NOTE — DISCHARGE SUMMARY
[facility]  Office: [phone]  Roberto Steven DO, Jordan Jimenez DO, Willie Byers DO, Miguelangel Baird DO, Patrick Quinn MD, Yaritza Burger MD, Jocelynn Medel MD, Leanna Cummings MD,  Marco Jones MD, David Patino MD, Sophie Roblero MD,  Zofia Pond DO, Ketty Madison MD, Chito Holley MD, Christos Steven DO, Francisca Miner MD,  Darci Barbour DO, Maryann Cuenca MD, Mitzy Bueno MD, Reina Sebastian MD,  Sacha Lofton MD, Kati Abraham MD, Cecily Stout MD, Jaswant Abel MD, Josh Fine MD, Santos Araya MD, Eliezer Perez DO, Marisol Antunez MD, Tutu Tyler MD, Zofia Martinez MD, Mohsin Reza, MD, Lawanda Yadav MD, Shirley Waterhouse, CNP,  Aletha Saleh, CNP, Eliezer Gutierrez, CNP,  Nayeli Laird, DNP, Verna Carrera, CNP, Savannah Gonzales, CNP, Lilly Crawford, CNP, Yelitza Henley, CNP, Becca Hawley, PA-C, Barby Howard, CNP, Vicki Reese, CNP,  Heather Santos, CNP, Christina Fuchs, CNP, Sourav Turpin, PA-C, Bertha Barrios, CNP,  Susan Byers, CNS, Alina Cordero, CNP, Fiona Servin, CNP,   Gianna Jones, CNP         [facility]   IN-PATIENT SERVICE   [facility]    Discharge Summary     Patient ID: Ana Kang  :  1955   MRN: [id number]     ACCOUNT:  [id number]   Patient's PCP: Liliam Edouard APRN - CNP  Admit Date: 5/15/2025   Discharge Date: 25  Length of Stay: 6  Code Status:  Prior  Admitting Physician: Darci Barbour DO  Discharge Physician: Ketty Madison MD     Active Discharge Diagnoses:     Hospital Problem Lists:  Principal Problem:    Ankle fracture, bimalleolar, closed, right, initial encounter  Active Problems:    Multiple sclerosis (HCC)    Anxiety    Crohn disease (HCC)    Benign essential HTN    Overactive bladder    Tremor    Closed displaced trimalleolar fracture of right lower leg  Resolved Problems:    * No resolved hospital problems. *      Admission Condition:  fair     Discharged Condition:

## 2025-06-02 ENCOUNTER — OFFICE VISIT (OUTPATIENT)
Dept: ORTHOPEDIC SURGERY | Age: 70
End: 2025-06-02

## 2025-06-02 VITALS — HEIGHT: 61 IN | BODY MASS INDEX: 51.89 KG/M2

## 2025-06-02 DIAGNOSIS — S82.851D CLOSED DISPLACED TRIMALLEOLAR FRACTURE OF RIGHT ANKLE WITH ROUTINE HEALING, SUBSEQUENT ENCOUNTER: Primary | ICD-10-CM

## 2025-06-02 PROCEDURE — 99024 POSTOP FOLLOW-UP VISIT: CPT | Performed by: PHYSICIAN ASSISTANT

## 2025-06-02 NOTE — PROGRESS NOTES
[facility]  [address]  [place] [address]  Dept Phone: [phone]  Dept Fax: [phone]      Orthopaedic Trauma Clinic Follow Up      Subjective:   Date of Surgery: 5/15/2025  - Right trimalleolar ankle fracture without posterior malleolus fixation  - Open treatment of right syndesmosis  - Stress examination right ankle under anesthesia with independent interpretation of imaging    Ana Kang is a 69 y.o. year old female who presents to the clinic today for routine follow up 2 weeks and 4 days s/p ORIF right ankle fracture with syndesmotic fixation. Pain well managed with medications. Reports compliance with NWB restrictions. Denies interval trauma since surgery.     Review of Systems  Gen: no fever, chills, malaise  CV: no chest pain or palpitations  Resp: no cough or shortness of breath  GI: no nausea, vomiting, diarrhea, or constipation  Neuro: no seizures, vertigo, or headache  Msk: right ankle pain and swelling  10 remaining systems reviewed and negative    Objective :   There were no vitals filed for this visit.Body mass index is 51.89 kg/m².  General: No acute distress, resting comfortably in the clinic  Neuro: alert. oriented  Eyes: Extra-ocular muscles intact  Pulm: Respirations unlabored and regular.  Skin: warm, well perfused  Psych:   Patient has good fund of knowledge and displays understanding of exam, diagnosis, and plan.  MSK: Right lower extremity: Splint and dressings removed. Residual post op swelling. Incisions healing well without sign of infections or other complication. Ankle ROM limited d/t pain, swelling, and stiffness. compartments soft. 2+ DP/PT pulses with BCR. TA/EHL/FHL/GS motor intact. Saph/Thelma/DP/SP nerves SILT.     Radiology:  No new radiographs today. Reviewed pre and post op studies with patient to explain injury and surgery.      Assessment:   69 y.o. year old female with right ankle fracture s/p ORIF  Plan:   - Sutures were

## 2025-06-08 DIAGNOSIS — G25.2 RUBRAL TREMOR: ICD-10-CM

## 2025-06-08 DIAGNOSIS — G35 MULTIPLE SCLEROSIS (HCC): ICD-10-CM

## 2025-06-08 DIAGNOSIS — M79.2 NEUROPATHIC PAIN: ICD-10-CM

## 2025-06-08 DIAGNOSIS — R10.13 DYSPEPSIA: ICD-10-CM

## 2025-06-09 RX ORDER — OMEPRAZOLE 20 MG/1
CAPSULE, DELAYED RELEASE ORAL
Qty: 90 CAPSULE | Refills: 3 | Status: SHIPPED | OUTPATIENT
Start: 2025-06-09

## 2025-06-10 RX ORDER — GABAPENTIN 300 MG/1
CAPSULE ORAL
Qty: 540 CAPSULE | Refills: 0 | Status: SHIPPED | OUTPATIENT
Start: 2025-06-10 | End: 2025-09-08

## 2025-06-10 NOTE — TELEPHONE ENCOUNTER
Pharmacy requesting refill of Gabapentin 300mg 2 caps TID.      Medication active on med list yes      Date of last fill: 5/20/25 #180 R-0  verified on 6/10/2025   verified by VS LPN      Date of last appointment 3/13/25    Next Visit Date:  6/17/2025    OARRS report unable to be reviewed by writer.

## 2025-06-16 NOTE — PROGRESS NOTES
[facility] NEUROLOGY  [address]  [place] [address]  Dept: [phone]    PATIENT NAME: Ana Kang  PATIENT MRN: [id number]  PRIMARY CARE PHYSICIAN: Liliam Edoaurd, APRN - CNP    History     Ana Kang is a 69 y.o. female who I initially saw in consultation on 10/5/2023. Her history is summarized as follows:      Ana Kang has a history of Crohn's disease  as well.  She initially developed neurological symptoms when she was 27 years old.  She became numb on the left side including the lower face, arm, and leg.  This gradually improved over time.  This was favored to be vascular in origin.  She had a 98% improvement to baseline over 6-8 weeks.       About 10 years later, she had a tingling sensation in the midsection.  This lasted about 6 weeks. About 7 years later, she had blurry vision OU.  She was seen by optometrist and sent for further evaluation for multiple sclerosis.   She believes she has CSF studies in the past and underwent an extensive work-up at this time.  Vision improved over 6 weeks. She was ultimately diagnosed with RRMS and started on Avonex.       She moved several times including a move from [place] and later to [place], [place]. She was switched from Avonex to dimethyl fumarate in the past due to preference for an oral medication. She was later switched to diroximel fumarate (Vumerity), presumably due to cost/insurance reasons.  She reports that she has been tolerating this medication well.  She denied significant diarrhea but also has a history of Crohn's disease.       Ms. Kang has a history of essential tremor, which has been difficult to control and refractory to several treatments.  She recalls that this began 10 years ago and is getting worse over time.  It is worse on the left and she also has a head tremor.  She feels that she has some benefit from primidone 50 mg PO BID, but has been on higher doses in the past (up

## 2025-06-17 ENCOUNTER — TELEMEDICINE (OUTPATIENT)
Dept: NEUROLOGY | Age: 70
End: 2025-06-17
Payer: MEDICARE

## 2025-06-17 DIAGNOSIS — G25.0 ESSENTIAL TREMOR: ICD-10-CM

## 2025-06-17 DIAGNOSIS — Z91.89 AT RISK FOR SIDE EFFECT OF MEDICATION: ICD-10-CM

## 2025-06-17 DIAGNOSIS — G25.2 RUBRAL TREMOR: ICD-10-CM

## 2025-06-17 DIAGNOSIS — G35 MULTIPLE SCLEROSIS (HCC): Primary | ICD-10-CM

## 2025-06-17 PROCEDURE — 99214 OFFICE O/P EST MOD 30 MIN: CPT | Performed by: PSYCHIATRY & NEUROLOGY

## 2025-06-17 PROCEDURE — 1090F PRES/ABSN URINE INCON ASSESS: CPT | Performed by: PSYCHIATRY & NEUROLOGY

## 2025-06-17 PROCEDURE — G8417 CALC BMI ABV UP PARAM F/U: HCPCS | Performed by: PSYCHIATRY & NEUROLOGY

## 2025-06-17 PROCEDURE — 1036F TOBACCO NON-USER: CPT | Performed by: PSYCHIATRY & NEUROLOGY

## 2025-06-17 PROCEDURE — 3017F COLORECTAL CA SCREEN DOC REV: CPT | Performed by: PSYCHIATRY & NEUROLOGY

## 2025-06-17 PROCEDURE — G8427 DOCREV CUR MEDS BY ELIG CLIN: HCPCS | Performed by: PSYCHIATRY & NEUROLOGY

## 2025-06-17 PROCEDURE — G8399 PT W/DXA RESULTS DOCUMENT: HCPCS | Performed by: PSYCHIATRY & NEUROLOGY

## 2025-06-17 PROCEDURE — 1123F ACP DISCUSS/DSCN MKR DOCD: CPT | Performed by: PSYCHIATRY & NEUROLOGY

## 2025-06-17 PROCEDURE — 1111F DSCHRG MED/CURRENT MED MERGE: CPT | Performed by: PSYCHIATRY & NEUROLOGY

## 2025-06-17 PROCEDURE — 1159F MED LIST DOCD IN RCRD: CPT | Performed by: PSYCHIATRY & NEUROLOGY

## 2025-06-17 RX ORDER — VIBEGRON 75 MG/1
TABLET, FILM COATED ORAL
COMMUNITY
Start: 2025-05-08

## 2025-06-17 RX ORDER — CLONAZEPAM 0.5 MG/1
0.25 TABLET ORAL 2 TIMES DAILY
Qty: 30 TABLET | Refills: 2 | Status: SHIPPED | OUTPATIENT
Start: 2025-06-17 | End: 2025-06-19 | Stop reason: SDUPTHER

## 2025-06-17 NOTE — TELEPHONE ENCOUNTER
Received a call from Lissy with [facility] she states that the patient's provider at the facility had increased her propranolol LA to 120mg on 5/29/2025 and D/C'd the Primidone on 5/21/2025. I advised her that the patient was started on Clonazepam today at her visit. Lissy sent over an updated medication list for your review. I've attached this to this encounter. Please advise.

## 2025-06-17 NOTE — PROGRESS NOTES
[facility] NEUROLOGY SPECIALIST  [address]  [place] [address]  Dept: [phone]  TELEHEALTH EVALUATION -- Audio/Visual    HPI:    Ana Kang is a 69 y.o. female who I initially saw in consultation on 10/5/2023. Her history is summarized as follows:      Ana Kang has a history of Crohn's disease  as well.  She initially developed neurological symptoms when she was 27 years old.  She became numb on the left side including the lower face, arm, and leg.  This gradually improved over time.  This was favored to be vascular in origin.  She had a 98% improvement to baseline over 6-8 weeks.       About 10 years later, she had a tingling sensation in the midsection.  This lasted about 6 weeks. About 7 years later, she had blurry vision OU.  She was seen by optometrist and sent for further evaluation for multiple sclerosis.   She believes she has CSF studies in the past and underwent an extensive work-up at this time.  Vision improved over 6 weeks. She was ultimately diagnosed with RRMS and started on Avonex.       She moved several times including a move from [place] and later to [place], [place]. She was switched from Avonex to dimethyl fumarate in the past due to preference for an oral medication. She was later switched to diroximel fumarate (Vumerity), presumably due to cost/insurance reasons.  She reports that she has been tolerating this medication well.  She denied significant diarrhea but also has a history of Crohn's disease.       Ms. Kang has a history of essential tremor, which has been difficult to control and refractory to several treatments.  She recalls that this began 10 years ago and is getting worse over time.  It is worse on the left and she also has a head tremor.  She feels that she has some benefit from primidone 50 mg PO BID, but has been on higher doses in the past (up to 250 mg daily). She did not have a clear benefit from higher doses and did have

## 2025-06-19 RX ORDER — CLONAZEPAM 0.5 MG/1
0.25 TABLET ORAL 2 TIMES DAILY
Qty: 30 TABLET | Refills: 2 | Status: SHIPPED | OUTPATIENT
Start: 2025-06-19 | End: 2025-09-17

## 2025-06-19 NOTE — TELEPHONE ENCOUNTER
I contacted [facility] and spoke with Elana. She was notified of the medication changes. New script needed to be sent to their facility to be filled. OARRS checked--prescription sent to [facility] has not been filled yet.

## 2025-06-30 ENCOUNTER — OFFICE VISIT (OUTPATIENT)
Dept: ORTHOPEDIC SURGERY | Age: 70
End: 2025-06-30

## 2025-06-30 DIAGNOSIS — S82.851D CLOSED DISPLACED TRIMALLEOLAR FRACTURE OF RIGHT ANKLE WITH ROUTINE HEALING, SUBSEQUENT ENCOUNTER: Primary | ICD-10-CM

## 2025-06-30 PROCEDURE — 99024 POSTOP FOLLOW-UP VISIT: CPT | Performed by: PHYSICIAN ASSISTANT

## 2025-06-30 NOTE — PROGRESS NOTES
[facility]  [address]  [place] [address]  Dept Phone: [phone]  Dept Fax: [phone]      Orthopaedic Trauma Clinic Follow Up      Subjective:   Date of Surgery: 5/15/2025  - Right trimalleolar ankle fracture without posterior malleolus fixation  - Open treatment of right syndesmosis  - Stress examination right ankle under anesthesia with independent interpretation of imaging    Ana Kang is a 69 y.o. year old female who presents to the clinic today for routine follow up 6 weeks and 4 days s/p ORIF right ankle fracture with syndesmotic fixation. Pain well managed with medications. Reports compliance with NWB restrictions. Denies interval trauma since surgery.  Patient has been utilizing cam boot for incisional protection.  She denies any interval injury, trauma or fall.  She presents with her  today.    Review of Systems  Gen: no fever, chills, malaise  CV: no chest pain or palpitations  Resp: no cough or shortness of breath  GI: no nausea, vomiting, diarrhea, or constipation  Neuro: no seizures, vertigo, or headache  Msk: right ankle pain and swelling  10 remaining systems reviewed and negative    Objective :   There were no vitals filed for this visit.There is no height or weight on file to calculate BMI.  General: No acute distress, resting comfortably in the clinic  Neuro: alert. oriented  Eyes: Extra-ocular muscles intact  Pulm: Respirations unlabored and regular.  Skin: warm, well perfused  Psych:   Patient has good fund of knowledge and displays understanding of exam, diagnosis, and plan.  MSK:  Right lower extremity: Incisions well healed. No ankle instability. Ankle ROM improved but somewhat limited d/t pain, swelling, and stiffness. compartments soft. 2+ DP/PT pulses with BCR. TA/EHL/FHL/GS motor intact. Saph/Thelma/DP/SP nerves SILT.     Radiology:  Imaging studies from today were independently reviewed and read as listed below. Any relevant

## 2025-07-01 ENCOUNTER — TELEPHONE (OUTPATIENT)
Dept: ORTHOPEDIC SURGERY | Age: 70
End: 2025-07-01

## 2025-07-01 NOTE — TELEPHONE ENCOUNTER
ANSELMO Hsu, from facility called to verify orders. Patient  current orders are, may bear gentle weight of transition from chair to bed or to commode. Shadi states she does not know what that means, Explained TTWB. She states patient can not TTWB d/t MS. Spoke with Chucho. OK for WBAT with transfers only.   Returned call to Shadi, no answer. Did not appears to be a secure line, so no message was left.   Letter created. Will fax once number obtained.

## 2025-07-09 DIAGNOSIS — I10 BENIGN ESSENTIAL HTN: ICD-10-CM

## 2025-07-09 RX ORDER — AMLODIPINE BESYLATE 5 MG/1
5 TABLET ORAL DAILY
Qty: 90 TABLET | Refills: 3 | Status: SHIPPED | OUTPATIENT
Start: 2025-07-09

## 2025-07-21 ENCOUNTER — TELEPHONE (OUTPATIENT)
Dept: PRIMARY CARE CLINIC | Age: 70
End: 2025-07-21

## 2025-07-21 ENCOUNTER — TELEPHONE (OUTPATIENT)
Dept: NEUROLOGY | Age: 70
End: 2025-07-21

## 2025-07-21 NOTE — TELEPHONE ENCOUNTER
Patient was seen in [facility] Neuro clinic on 07/15/2025, the office notes are attached below.

## 2025-07-21 NOTE — TELEPHONE ENCOUNTER
Marina mir from [facility] called to see if provider would be agreeable to following patient for home care orders. She stated they will fax over a form for provider to sign if provider is agreeable.

## 2025-08-04 ENCOUNTER — OFFICE VISIT (OUTPATIENT)
Dept: ORTHOPEDIC SURGERY | Age: 70
End: 2025-08-04

## 2025-08-04 VITALS — WEIGHT: 274 LBS | HEIGHT: 61 IN | BODY MASS INDEX: 51.73 KG/M2

## 2025-08-04 DIAGNOSIS — S82.851D CLOSED DISPLACED TRIMALLEOLAR FRACTURE OF RIGHT ANKLE WITH ROUTINE HEALING, SUBSEQUENT ENCOUNTER: Primary | ICD-10-CM

## 2025-08-04 PROCEDURE — 99024 POSTOP FOLLOW-UP VISIT: CPT | Performed by: STUDENT IN AN ORGANIZED HEALTH CARE EDUCATION/TRAINING PROGRAM

## 2025-08-12 ENCOUNTER — TELEPHONE (OUTPATIENT)
Dept: PRIMARY CARE CLINIC | Age: 70
End: 2025-08-12

## 2025-08-14 DIAGNOSIS — G35 MULTIPLE SCLEROSIS (HCC): Primary | ICD-10-CM

## 2025-08-29 ENCOUNTER — HOSPITAL ENCOUNTER (OUTPATIENT)
Dept: PHYSICAL THERAPY | Facility: CLINIC | Age: 70
Setting detail: THERAPIES SERIES
Discharge: HOME | End: 2025-08-29

## 2025-08-29 ENCOUNTER — OFFICE VISIT (OUTPATIENT)
Dept: NEUROLOGY | Age: 70
End: 2025-08-29

## 2025-08-29 VITALS
HEIGHT: 61 IN | HEART RATE: 59 BPM | DIASTOLIC BLOOD PRESSURE: 71 MMHG | SYSTOLIC BLOOD PRESSURE: 127 MMHG | BODY MASS INDEX: 51.77 KG/M2

## 2025-08-29 DIAGNOSIS — G35 MULTIPLE SCLEROSIS (HCC): Primary | ICD-10-CM

## 2025-08-29 LAB
AMIKACIN: NORMAL
AMPICILLIN + SULBACTAM: NORMAL
AMPICILLIN: NORMAL
CEFAZOLIN: NORMAL
CEFEPIME: NORMAL
CEFTRIAXONE: NORMAL
CIPROFLOXACIN: NORMAL
CULTURE RESULT: NORMAL
ERTAPENEM: NORMAL
GENTAMICIN: NORMAL
LEVOFLOXACIN: NORMAL
MEROPENEM: NORMAL
NITROFURANTOIN: NORMAL
PIPERACILLIN + TAZOBACTAM: NORMAL
TRIMETHOPRIM + SULFAMETHOXAZOLE: NORMAL
URINE CULTURE, ROUTINE: NORMAL STATUS

## 2025-08-29 RX ORDER — POTASSIUM CHLORIDE 1500 MG/1
TABLET, EXTENDED RELEASE ORAL
COMMUNITY
Start: 2025-08-14

## 2025-08-29 RX ORDER — ACETAMINOPHEN 325 MG/1
325-650 TABLET ORAL
COMMUNITY

## 2025-08-29 RX ORDER — LISINOPRIL AND HYDROCHLOROTHIAZIDE 20; 25 MG/1; MG/1
TABLET ORAL
COMMUNITY
Start: 2025-08-14

## 2025-09-04 ENCOUNTER — TELEPHONE (OUTPATIENT)
Dept: PRIMARY CARE CLINIC | Age: 70
End: 2025-09-04

## 2025-09-04 DIAGNOSIS — Z74.09: ICD-10-CM

## 2025-09-04 DIAGNOSIS — G35 MULTIPLE SCLEROSIS (HCC): Primary | ICD-10-CM

## (undated) DEVICE — GLOVE ORANGE PI 7 1/2   MSG9075

## (undated) DEVICE — PROGRAMMER SMART COMM W/HANDSET F/SACRAL NRVE STIMULATORS

## (undated) DEVICE — BANDAGE COMPR W6INXL15YD WHT BGE POLY COT WV E HK LOOP CLSR

## (undated) DEVICE — 3M™ IOBAN™ 2 ANTIMICROBIAL INCISE DRAPE 6650EZ: Brand: IOBAN™ 2

## (undated) DEVICE — BANDAGE,GAUZE,BULKEE II,4.5"X4.1YD,STRL: Brand: MEDLINE

## (undated) DEVICE — SHEET, ORTHO, SPLIT, STERILE: Brand: MEDLINE

## (undated) DEVICE — C-ARMOR C-ARM EQUIPMENT COVERS CLEAR STERILE UNIVERSAL FIT 12 PER CASE: Brand: C-ARMOR

## (undated) DEVICE — SUTURE VIC + SH-13-0 27IN  VCP311H

## (undated) DEVICE — BNDG,ELSTC,MATRIX,STRL,4"X5YD,LF,HOOK&LP: Brand: MEDLINE

## (undated) DEVICE — GLOVE ORTHO 8   MSG9480

## (undated) DEVICE — SUTURE MCRYL + SZ 4 0 L18IN ABSRB UD PC 3 L16MM 3 8 CIR PRIM MCP845G

## (undated) DEVICE — SUTURE MCRYL SZ 4-0 L18IN ABSRB UD L16MM PC-3 3/8 CIR PRIM Y845G

## (undated) DEVICE — SUTURE PDS + SZ 0 L36IN ABSRB VLT CT 1 L36MM 1 2 CIR PDP346H

## (undated) DEVICE — CYSTO/BLADDER IRRIGATION SET, REGULATING CLAMP

## (undated) DEVICE — BIT DRILL SYNDESMOSIS 2.5 MM

## (undated) DEVICE — BNDG,ELSTC,MATRIX,STRL,6"X5YD,LF,HOOK&LP: Brand: MEDLINE

## (undated) DEVICE — BIT DRL DIA2MM STR CANN FOR 2.5MM MIC COMPR FULL THRD SCR

## (undated) DEVICE — SUTURE ETHILON SZ 2-0 L18IN NONABSORBABLE BLK L26MM PS 3/8 585H

## (undated) DEVICE — SURGICAL SUCTION CONNECTING TUBE WITH MALE CONNECTOR AND SUCTION CLAMP, 2 FT. LONG (.6 M), 5 MM I.D.: Brand: CONMED

## (undated) DEVICE — DRAPE SURG W41XL74IN CLR FULL SZ C ARM 3 ADH POLY STRP E

## (undated) DEVICE — APPLICATOR MEDICATED 26 CC SOLUTION HI LT ORNG CHLORAPREP

## (undated) DEVICE — PREMIUM DRY TRAY LF: Brand: MEDLINE INDUSTRIES, INC.

## (undated) DEVICE — GOWN,AURORA,NONREINFORCED,LARGE: Brand: MEDLINE

## (undated) DEVICE — STRAP,POSITIONING,KNEE/BODY,FOAM,4X60": Brand: MEDLINE

## (undated) DEVICE — SHEET,DRAPE,70X100,STERILE: Brand: MEDLINE

## (undated) DEVICE — BLADE,CARBON-STEEL,15,STRL,DISPOSABLE,TB: Brand: MEDLINE

## (undated) DEVICE — SYSTEM IMPL REAMER FIBULOCK PROX 3.2 MM DSTL 6.2 MM DRL 2 MM

## (undated) DEVICE — DRAPE,U/ SHT,SPLIT,PLAS,STERIL: Brand: MEDLINE

## (undated) DEVICE — 60-7070-104 TRNQT,DPSB,PLC GREEN: Brand: MEDLINE RENEWAL

## (undated) DEVICE — DRESSING,GAUZE,XEROFORM,CURAD,1"X8",ST: Brand: CURAD

## (undated) DEVICE — LIQUIBAND RAPID ADHESIVE 36/CS 0.8ML: Brand: MEDLINE

## (undated) DEVICE — ELECTRODE PT RET AD L9FT HI MOIST COND ADH HYDRGEL CORDED

## (undated) DEVICE — PAD,ABDOMINAL,5"X9",ST,LF,25/BX: Brand: MEDLINE INDUSTRIES, INC.

## (undated) DEVICE — GLOVE SURG SZ 65 L12IN FNGR THK79MIL GRN LTX FREE

## (undated) DEVICE — GOWN,SIRUS,NONRNF,SETINSLV,2XL,18/CS: Brand: MEDLINE

## (undated) DEVICE — Device

## (undated) DEVICE — 60-7070-106 TRNQT,DPSB,PLC BROWN: Brand: MEDLINE RENEWAL

## (undated) DEVICE — MHPB GEN MINOR PACK: Brand: MEDLINE INDUSTRIES, INC.

## (undated) DEVICE — NEEDLE NERVE STIM FORAMEN 5 IN INTERSTIM

## (undated) DEVICE — GUIDEWIRE ORTH DIA0.034IN W/ TRCR TIP LSR MRK DISP FOR MIC

## (undated) DEVICE — SVMMC PEDS/UROLOGY MINOR PACK: Brand: MEDLINE INDUSTRIES, INC.

## (undated) DEVICE — C-ARM: Brand: UNBRANDED

## (undated) DEVICE — DRESSING TRNSPAR W8XL12IN FLM SURESITE 123

## (undated) DEVICE — GLOVE ORANGE PI 8   MSG9080

## (undated) DEVICE — PADDING CAST W6INXL4YD COT LO LINTING WYTEX

## (undated) DEVICE — STRAP ARMBRD W1.5XL32IN FOAM STR YET SFT W/ HK AND LOOP

## (undated) DEVICE — SUTURE VCRL SZ 3-0 L27IN ABSRB UD L26MM SH 1/2 CIR J416H

## (undated) DEVICE — NEUROSTIMULATOR EXT SM LTWT SGL BTTN H2O RESIST WIRELESS

## (undated) DEVICE — PADDING,UNDERCAST,COTTON, 4"X4YD STERILE: Brand: MEDLINE

## (undated) DEVICE — SUTURE NONABSORBABLE MONOFILAMENT 3-0 PS-1 18 IN BLK ETHILON 1663H

## (undated) DEVICE — APPLICATOR MEDICATED 10.5 CC SOLUTION HI LT ORNG CHLORAPREP

## (undated) DEVICE — SUTURE MONOCRYL SZ 2-0 L27IN ABSRB UD SH L26MM TAPERPOINT NDL Y417H

## (undated) DEVICE — SUTURE MONOCRYL + SZ 2 0 L27IN ABSRB UD CP 1 L36MM 1 2 CIR REV MCP266H

## (undated) DEVICE — SUTURE PDS II SZ 0 L27IN ABSRB VLT L36MM CT-1 1/2 CIR Z340H

## (undated) DEVICE — GOWN,AURORA,NONRNF,XL,30/CS: Brand: MEDLINE

## (undated) DEVICE — GUIDEWIRE ORTH L150MM DIA0.053IN W/ TRCR TIP FOR ANK FRAC

## (undated) DEVICE — BANDAGE COBAN 4 IN COMPR W4INXL5YD FOAM COHESIVE QUIK STK SELF ADH SFT